# Patient Record
Sex: MALE | Race: WHITE | NOT HISPANIC OR LATINO | Employment: OTHER | URBAN - NONMETROPOLITAN AREA
[De-identification: names, ages, dates, MRNs, and addresses within clinical notes are randomized per-mention and may not be internally consistent; named-entity substitution may affect disease eponyms.]

---

## 2017-01-18 RX ORDER — ATORVASTATIN CALCIUM 20 MG/1
TABLET, FILM COATED ORAL
COMMUNITY
Start: 2016-11-03 | End: 2017-03-24 | Stop reason: SDUPTHER

## 2017-01-18 RX ORDER — HYDROCODONE BITARTRATE AND ACETAMINOPHEN 7.5; 325 MG/1; MG/1
1 TABLET ORAL EVERY 8 HOURS PRN
Refills: 0 | COMMUNITY
Start: 2016-11-23

## 2017-01-18 RX ORDER — LISINOPRIL 5 MG/1
TABLET ORAL
Refills: 0 | COMMUNITY
Start: 2016-12-01 | End: 2017-03-24

## 2017-02-09 RX ORDER — FUROSEMIDE 40 MG/1
TABLET ORAL
Qty: 90 TABLET | Refills: 0 | Status: SHIPPED | OUTPATIENT
Start: 2017-02-09 | End: 2017-03-24 | Stop reason: SDUPTHER

## 2017-02-09 RX ORDER — LISINOPRIL 10 MG/1
TABLET ORAL
Qty: 90 TABLET | Refills: 0 | Status: SHIPPED | OUTPATIENT
Start: 2017-02-09 | End: 2017-03-24 | Stop reason: SDUPTHER

## 2017-03-24 RX ORDER — CARVEDILOL 25 MG/1
25 TABLET ORAL 2 TIMES DAILY WITH MEALS
Qty: 180 TABLET | Refills: 0 | Status: SHIPPED | OUTPATIENT
Start: 2017-03-24 | End: 2017-06-16 | Stop reason: SDUPTHER

## 2017-03-24 RX ORDER — LISINOPRIL 10 MG/1
10 TABLET ORAL DAILY
Qty: 90 TABLET | Refills: 0 | Status: SHIPPED | OUTPATIENT
Start: 2017-03-24 | End: 2017-06-16 | Stop reason: SDUPTHER

## 2017-03-24 RX ORDER — ATORVASTATIN CALCIUM 20 MG/1
20 TABLET, FILM COATED ORAL NIGHTLY
Qty: 90 TABLET | Refills: 0 | Status: SHIPPED | OUTPATIENT
Start: 2017-03-24 | End: 2017-06-16 | Stop reason: SDUPTHER

## 2017-03-24 RX ORDER — FUROSEMIDE 40 MG/1
40 TABLET ORAL DAILY
Qty: 90 TABLET | Refills: 0 | Status: SHIPPED | OUTPATIENT
Start: 2017-03-24 | End: 2017-06-16 | Stop reason: SDUPTHER

## 2017-03-28 ENCOUNTER — OFFICE VISIT (OUTPATIENT)
Dept: VASCULAR SURGERY | Facility: CLINIC | Age: 67
End: 2017-03-28

## 2017-03-28 ENCOUNTER — HOSPITAL ENCOUNTER (OUTPATIENT)
Dept: ULTRASOUND IMAGING | Facility: HOSPITAL | Age: 67
Discharge: HOME | End: 2017-03-28
Attending: SURGERY | Admitting: SURGERY

## 2017-03-28 VITALS
HEART RATE: 96 BPM | BODY MASS INDEX: 31.97 KG/M2 | HEIGHT: 72 IN | SYSTOLIC BLOOD PRESSURE: 152 MMHG | DIASTOLIC BLOOD PRESSURE: 80 MMHG | WEIGHT: 236 LBS

## 2017-03-28 DIAGNOSIS — I65.23 CAROTID OCCLUSION, BILATERAL: ICD-10-CM

## 2017-03-28 DIAGNOSIS — I73.9 PVD (PERIPHERAL VASCULAR DISEASE) (HCC): ICD-10-CM

## 2017-03-28 DIAGNOSIS — E78.2 MIXED HYPERLIPIDEMIA: ICD-10-CM

## 2017-03-28 DIAGNOSIS — I10 ESSENTIAL HYPERTENSION: ICD-10-CM

## 2017-03-28 DIAGNOSIS — I65.23 BILATERAL CAROTID ARTERY STENOSIS: Primary | ICD-10-CM

## 2017-03-28 PROCEDURE — 99214 OFFICE O/P EST MOD 30 MIN: CPT | Performed by: SURGERY

## 2017-03-28 PROCEDURE — 93880 EXTRACRANIAL BILAT STUDY: CPT | Performed by: SURGERY

## 2017-03-28 PROCEDURE — 93880 EXTRACRANIAL BILAT STUDY: CPT

## 2017-03-28 NOTE — PROGRESS NOTES
"03/28/2017      Alli Perry MD  [address] [facility] [address]        Jadon Flynn  1950    Chief Complaint   Patient presents with   • Follow-up       Dear Alli Perry MD:    HPI     I had the pleasure of seeing you patient in the office today for follow up.  As you recall, the patient is a 66 y.o. male who we are currently following for carotid occlusive disease.  He is status post left carotid endarterectomy and has a known right carotid occlusion.  Currently, he appears to be doing quite well and has no specific complaints.  He remains asymptomatic from a strokelike standpoint.  He had noninvasive testing performed today in which I personally reviewed.      /80  Pulse 96  Ht 72\" (182.9 cm)  Wt 236 lb (107 kg)  BMI 32.01 kg/m2  Physical Exam   Constitutional: He is oriented to person, place, and time. He appears well-developed and well-nourished.   HENT:   Head: Normocephalic and atraumatic.   Neck: Neck supple. No JVD present. Carotid bruit is not present. No thyromegaly present.   Cardiovascular: Normal rate, regular rhythm and normal heart sounds.    Pulses:       Carotid pulses are 2+ on the right side, and 2+ on the left side.       Femoral pulses are 2+ on the right side, and 2+ on the left side.       Popliteal pulses are 2+ on the right side, and 2+ on the left side.        Dorsalis pedis pulses are 2+ on the right side, and 2+ on the left side.        Posterior tibial pulses are 2+ on the right side, and 2+ on the left side.   Pulmonary/Chest: Effort normal and breath sounds normal.   Abdominal: Soft. Bowel sounds are normal. He exhibits no distension, no abdominal bruit and no mass. There is no hepatosplenomegaly. There is no tenderness.   Musculoskeletal: Normal range of motion. He exhibits no edema.   Neurological: He is alert and oriented to person, place, and time. He has normal strength. No cranial nerve deficit or sensory deficit.   Skin: Skin is warm and intact. "   Nursing note and vitals reviewed.      DIAGNOSTIC DATA: There is a known right carotid occlusion.  There is 50-69% carotid occlusive disease on the left.  There is bilateral antegrade vertebral artery flow.    Patient Active Problem List   Diagnosis   • PVD (peripheral vascular disease)   • CAD (coronary artery disease)   • S/P implantation of automatic cardioverter/defibrillator (AICD)   • HLD (hyperlipidemia)   • Ischemic heart disease   • HTN (hypertension)   • CHF (congestive heart failure)   • CKD (chronic kidney disease)         ICD-10-CM ICD-9-CM   1. Bilateral carotid artery stenosis I65.23 433.10     433.30   2. Mixed hyperlipidemia E78.2 272.2   3. Essential hypertension I10 401.9   4. PVD (peripheral vascular disease) I73.9 443.9           PLAN: After thoroughly evaluating Jadon Flynn, I believe the best course of action is to continue to remain conservative from a vascular surgery standpoint.  Currently, he appears to be doing quite well and remains a symptomatically from a strokelike standpoint.  His noninvasive testing is within normal limits and unchanged.  I will see him back in 1 year's time with a repeat carotid duplex for continued surveillance.  The patient is to continue taking their medications as previously discussed.   This was all discussed in full with complete understanding.  Thank you for allowing me to participate in the care of your patient.  Please do not hesitate to call with any questions or concerns.  We will keep you aware of any further encounters with Jadon Flynn.      Sincerely Yours,        Dave Chavarria, DO

## 2017-06-16 ENCOUNTER — OFFICE VISIT (OUTPATIENT)
Dept: CARDIOLOGY | Facility: CLINIC | Age: 67
End: 2017-06-16

## 2017-06-16 VITALS
DIASTOLIC BLOOD PRESSURE: 68 MMHG | SYSTOLIC BLOOD PRESSURE: 120 MMHG | BODY MASS INDEX: 28.37 KG/M2 | HEIGHT: 76 IN | WEIGHT: 233 LBS | OXYGEN SATURATION: 98 % | HEART RATE: 64 BPM

## 2017-06-16 DIAGNOSIS — E78.2 MIXED HYPERLIPIDEMIA: ICD-10-CM

## 2017-06-16 DIAGNOSIS — I50.22 CHRONIC SYSTOLIC CONGESTIVE HEART FAILURE (HCC): ICD-10-CM

## 2017-06-16 DIAGNOSIS — I25.10 CORONARY ARTERY DISEASE INVOLVING NATIVE CORONARY ARTERY OF NATIVE HEART WITHOUT ANGINA PECTORIS: Primary | ICD-10-CM

## 2017-06-16 DIAGNOSIS — Z95.810 S/P IMPLANTATION OF AUTOMATIC CARDIOVERTER/DEFIBRILLATOR (AICD): ICD-10-CM

## 2017-06-16 DIAGNOSIS — I10 ESSENTIAL HYPERTENSION: ICD-10-CM

## 2017-06-16 PROCEDURE — 93000 ELECTROCARDIOGRAM COMPLETE: CPT | Performed by: PHYSICIAN ASSISTANT

## 2017-06-16 PROCEDURE — 99214 OFFICE O/P EST MOD 30 MIN: CPT | Performed by: PHYSICIAN ASSISTANT

## 2017-06-16 RX ORDER — LISINOPRIL 10 MG/1
10 TABLET ORAL DAILY
Qty: 90 TABLET | Refills: 3 | Status: SHIPPED | OUTPATIENT
Start: 2017-06-16 | End: 2018-07-29 | Stop reason: SDUPTHER

## 2017-06-16 RX ORDER — ATORVASTATIN CALCIUM 20 MG/1
20 TABLET, FILM COATED ORAL NIGHTLY
Qty: 90 TABLET | Refills: 3 | Status: SHIPPED | OUTPATIENT
Start: 2017-06-16 | End: 2018-07-29 | Stop reason: SDUPTHER

## 2017-06-16 RX ORDER — CLOPIDOGREL BISULFATE 75 MG/1
75 TABLET ORAL DAILY
Qty: 90 TABLET | Refills: 3 | Status: SHIPPED | OUTPATIENT
Start: 2017-06-16 | End: 2018-09-26 | Stop reason: SDUPTHER

## 2017-06-16 RX ORDER — FUROSEMIDE 40 MG/1
40 TABLET ORAL DAILY
Qty: 90 TABLET | Refills: 3 | Status: SHIPPED | OUTPATIENT
Start: 2017-06-16 | End: 2018-07-29 | Stop reason: SDUPTHER

## 2017-06-16 RX ORDER — CARVEDILOL 25 MG/1
25 TABLET ORAL 2 TIMES DAILY WITH MEALS
Qty: 180 TABLET | Refills: 3 | Status: SHIPPED | OUTPATIENT
Start: 2017-06-16 | End: 2018-06-30 | Stop reason: SDUPTHER

## 2017-06-16 NOTE — PROGRESS NOTES
Subjective:     Encounter Date:06/16/2017      Patient ID: Jadon Flynn is a 66 y.o. male w hx of CAD, CHF, s/p AICD, HLD, HTN who [facility]. The patient relates he's feeling well. He denies any edema, dyspnea, chest pain, palpitations, orthopnea, weight gain or related. He states he's able to exert himself daily, working in the yard, walking, etc.without any issue. He relates his blood pressure has been well controlled now for some time.     Chief Complaint:  Coronary Artery Disease   Presents for follow-up visit. Pertinent negatives include no chest pain, dizziness, leg swelling, palpitations, shortness of breath or weight gain. Risk factors include hyperlipidemia. His past medical history is significant for CHF. The symptoms have been stable. Compliance with diet is good. Compliance with exercise is good. Compliance with medications is good.   Congestive Heart Failure   Presents for follow-up visit. Pertinent negatives include no chest pain, claudication, near-syncope, orthopnea, palpitations or shortness of breath. The symptoms have been stable. His past medical history is significant for CAD.   Hypertension   This is a chronic problem. The current episode started more than 1 year ago. The problem has been gradually improving since onset. The problem is controlled. Pertinent negatives include no chest pain, malaise/fatigue, orthopnea, palpitations, PND or shortness of breath. There are no associated agents to hypertension. Risk factors for coronary artery disease include dyslipidemia and male gender. Past treatments include ACE inhibitors, beta blockers and diuretics. The current treatment provides moderate improvement. There are no compliance problems.  Hypertensive end-organ damage includes CAD/MI. Identifiable causes of hypertension include chronic renal disease.   Hyperlipidemia   This is a chronic problem. The current episode started more than 1 year ago.  Exacerbating diseases include chronic renal disease. There are no known factors aggravating his hyperlipidemia. Pertinent negatives include no chest pain, focal weakness, myalgias or shortness of breath. Current antihyperlipidemic treatment includes statins. The current treatment provides moderate improvement of lipids. There are no compliance problems.  Risk factors for coronary artery disease include dyslipidemia, male sex and hypertension.       The following portions of the patient's history were reviewed and updated as appropriate: allergies, current medications, past family history, past medical history, past social history, past surgical history and problem list.    Review of Systems   Constitution: Negative for malaise/fatigue and weight gain.   Cardiovascular: Negative for chest pain, claudication, dyspnea on exertion, irregular heartbeat, leg swelling, near-syncope, orthopnea, palpitations, paroxysmal nocturnal dyspnea and syncope.   Respiratory: Negative for hemoptysis and shortness of breath.    Hematologic/Lymphatic: Negative for bleeding problem.   Skin: Negative for poor wound healing.   Musculoskeletal: Negative for myalgias.   Gastrointestinal: Negative for melena, nausea and vomiting.   Genitourinary: Negative for hematuria.   Neurological: Negative for dizziness, focal weakness and light-headedness.   Psychiatric/Behavioral: Negative for memory loss.   All other systems reviewed and are negative.        ECG 12 Lead  Date/Time: 6/16/2017 1:46 PM  Performed by: MICHAEL DOMINGUEZ  Authorized by: MICHAEL DOMINGUEZ   Comparison: compared with previous ECG from 6/14/2016  Comparison to previous ECG: LBBB is now present, iLBBB last EKG  Rhythm: sinus rhythm  Rate: normal  Conduction: left bundle branch block  ST Segments: ST segments normal  T depression: aVL  QRS axis: left  Clinical impression: abnormal ECG               Objective:     Physical Exam   Constitutional: He is oriented to person, place, and  "time. He appears well-developed and well-nourished.   HENT:   Head: Normocephalic and atraumatic.   Eyes: Conjunctivae and EOM are normal. Pupils are equal, round, and reactive to light.   Neck: Normal range of motion. Neck supple. No JVD present.   Cardiovascular: Normal rate, regular rhythm, S1 normal, S2 normal, normal heart sounds, intact distal pulses and normal pulses.    No murmur heard.  Pulmonary/Chest: Effort normal and breath sounds normal. No respiratory distress.   Abdominal: Soft. Bowel sounds are normal. He exhibits no distension.   Musculoskeletal: He exhibits no edema.   Neurological: He is alert and oriented to person, place, and time.   Skin: Skin is warm and dry.   Psychiatric: He has a normal mood and affect. Judgment normal.   Vitals reviewed.      /68 (BP Location: Right arm, Patient Position: Sitting, Cuff Size: Adult)  Pulse 64  Ht 76\" (193 cm)  Wt 233 lb (106 kg)  SpO2 98%  BMI 28.36 kg/m2    Current Outpatient Prescriptions:   •  allopurinol (ZYLOPRIM) 100 MG tablet, Take 100 mg by mouth Daily., Disp: , Rfl:   •  aspirin 81 MG EC tablet, Take 81 mg by mouth Daily., Disp: , Rfl:   •  atorvastatin (LIPITOR) 20 MG tablet, Take 1 tablet by mouth Every Night., Disp: 90 tablet, Rfl: 3  •  carvedilol (COREG) 25 MG tablet, Take 1 tablet by mouth 2 (Two) Times a Day With Meals., Disp: 180 tablet, Rfl: 3  •  clopidogrel (PLAVIX) 75 MG tablet, Take 1 tablet by mouth Daily., Disp: 90 tablet, Rfl: 3  •  furosemide (LASIX) 40 MG tablet, Take 1 tablet by mouth Daily., Disp: 90 tablet, Rfl: 3  •  HYDROcodone-acetaminophen (NORCO) 7.5-325 MG per tablet, take 1 tablet by mouth three times a day if needed for pain -FILL DATE 11-, Disp: , Rfl: 0  •  lisinopril (PRINIVIL,ZESTRIL) 10 MG tablet, Take 1 tablet by mouth Daily., Disp: 90 tablet, Rfl: 3  Past Medical History:   Diagnosis Date   • CAD (coronary artery disease) 12/07/2016   • Carotid artery disease    • CHF (congestive heart failure) " 12/07/2016   • CKD (chronic kidney disease) 12/07/2016   • Gout    • HLD (hyperlipidemia) 12/07/2016   • HTN (hypertension) 12/07/2016   • Ischemic heart disease 12/07/2016   • PVD (peripheral vascular disease) 12/07/2016   • S/P implantation of automatic cardioverter/defibrillator (AICD) 12/07/2016   • Stroke      Past Surgical History:   Procedure Laterality Date   • CARDIAC DEFIBRILLATOR PLACEMENT     • CAROTID ENDARTERECTOMY     • CARPAL TUNNEL RELEASE Right    • CORONARY STENT PLACEMENT     • HYDROCELE EXCISION / REPAIR     • INSERT / REPLACE / REMOVE PACEMAKER     • TOTAL KNEE ARTHROPLASTY Left      No Known Allergies  Social History     Social History   • Marital status:      Spouse name: N/A   • Number of children: N/A   • Years of education: N/A     Occupational History   • Not on file.     Social History Main Topics   • Smoking status: Former Smoker     Quit date: 2010   • Smokeless tobacco: Never Used   • Alcohol use No   • Drug use: No   • Sexual activity: Defer     Other Topics Concern   • Not on file     Social History Narrative     Family History   Problem Relation Age of Onset   • Cancer Father    • Heart disease Mother    • Diabetes Mother    • Sleep apnea Mother    • Hypertension Mother            Assessment:          Diagnosis Plan   1. Coronary artery disease involving native coronary artery of native heart without angina pectoris      on asa and plavix   2. S/P implantation of automatic cardioverter/defibrillator (AICD)     3. Mixed hyperlipidemia  Lipid Panel    on statin    4. Essential hypertension      Well controlled   5. Chronic systolic congestive heart failure            Plan:       1. Continue present therapy  2. Patient educated at length regarding low sodium diet, daily weights, and daily blood pressure monitoring. Patient instructed to bring daily weights and blood pressures to follow up office visit. Patient instructed to call with a 2lb weight gain overnight, 5lb weight gain  in 1 week, increasing dyspnea or edema.  3. Counseled on diet, exercise, medication compliance  4. Follow-up in 1 year unless needed sooner  5. Verbalized understanding of instructions.

## 2017-12-05 ENCOUNTER — CLINICAL SUPPORT (OUTPATIENT)
Dept: CARDIOLOGY | Facility: CLINIC | Age: 67
End: 2017-12-05

## 2017-12-05 DIAGNOSIS — Z95.810 S/P IMPLANTATION OF AUTOMATIC CARDIOVERTER/DEFIBRILLATOR (AICD): ICD-10-CM

## 2017-12-05 PROCEDURE — 93295 DEV INTERROG REMOTE 1/2/MLT: CPT | Performed by: PHYSICIAN ASSISTANT

## 2017-12-05 PROCEDURE — 93296 REM INTERROG EVL PM/IDS: CPT | Performed by: PHYSICIAN ASSISTANT

## 2017-12-06 NOTE — PROGRESS NOTES
Single Chamber AICD Evaluation Report  Clinic Interrogation    December 5, 2017    Primary Cardiologist: Mike  : Guidant Model: Teligen  Implant date: 11/9/11    Reason for evaluation: routine  Indication for AICD: chronic systolic heart failure    Measurements  Ventricular sensing - R wave: 5.2 mV  Ventricular threshold: 1.8 V @ 0.4 ms  Ventricular lead impedance: 384 ohms  Shock Impedance: 54 ohms        Diagnostic Data  Paced: 2 %  Other: 3 episodes of NSVT  Battery status: satisfactory     Final Parameters  Mode: VVI  Lower rate: 50 bpm   Ventricular - Amplitude: 4.0 V   Pulse width: 0.4 ms   Sensitivity: 0.6 mV   Changes made: n/a  Conclusions: normal AICD function    Follow up: 6 months

## 2018-03-06 ENCOUNTER — CLINICAL SUPPORT NO REQUIREMENTS (OUTPATIENT)
Dept: CARDIOLOGY | Facility: CLINIC | Age: 68
End: 2018-03-06

## 2018-03-06 DIAGNOSIS — I50.22 CHRONIC SYSTOLIC CONGESTIVE HEART FAILURE (HCC): ICD-10-CM

## 2018-03-06 DIAGNOSIS — Z95.810 S/P IMPLANTATION OF AUTOMATIC CARDIOVERTER/DEFIBRILLATOR (AICD): Primary | ICD-10-CM

## 2018-03-06 PROCEDURE — 93282 PRGRMG EVAL IMPLANTABLE DFB: CPT | Performed by: INTERNAL MEDICINE

## 2018-03-07 NOTE — PROGRESS NOTES
Single Chamber AICD Evaluation Report  IN OFFICE    March 6, 2018    Primary Cardiologist: Mike  : Guidant Model: Teligen  Implant date: 11/09/2011    Reason for evaluation: routine  Indication for AICD: congestive heart failure    Measurements  Ventricular sensing - R wave: 4.7 mV  Ventricular threshold: 1.7 V @ 0.4 ms  Ventricular lead impedance: 392 ohms  Shock Impedance: 59 ohms      Diagnostic Data  Paced: 1 %  Other: No episodes recorded  Battery status: satisfactory, est 7 years remaining     Final Parameters  Mode: VVI  Lower rate: 50 bpm   Ventricular - Amplitude: 3.4 V   Pulse width: 0.4 ms   Sensitivity: 0.6 mV   Changes made: Normal sebas ventricular output decreased from 4V to 3.4V.  Conclusions: normal AICD function, no therapy delivered, stable pacing and sensing thresholds and adequate battery reserve    Follow up: 3 months via [facility]--Louann Jeffery MA, ordered new monitor for patient.  Will follow remotely every three months and will see in office in one year.

## 2018-03-27 ENCOUNTER — OFFICE VISIT (OUTPATIENT)
Dept: VASCULAR SURGERY | Facility: CLINIC | Age: 68
End: 2018-03-27

## 2018-03-27 ENCOUNTER — HOSPITAL ENCOUNTER (OUTPATIENT)
Dept: ULTRASOUND IMAGING | Facility: HOSPITAL | Age: 68
Discharge: HOME | End: 2018-03-27
Admitting: NURSE PRACTITIONER

## 2018-03-27 VITALS
HEIGHT: 76 IN | HEART RATE: 87 BPM | WEIGHT: 230 LBS | SYSTOLIC BLOOD PRESSURE: 126 MMHG | DIASTOLIC BLOOD PRESSURE: 76 MMHG | BODY MASS INDEX: 28.01 KG/M2 | OXYGEN SATURATION: 99 %

## 2018-03-27 DIAGNOSIS — I65.23 BILATERAL CAROTID ARTERY STENOSIS: Primary | ICD-10-CM

## 2018-03-27 DIAGNOSIS — I10 ESSENTIAL HYPERTENSION: ICD-10-CM

## 2018-03-27 DIAGNOSIS — I65.23 BILATERAL CAROTID ARTERY STENOSIS: ICD-10-CM

## 2018-03-27 DIAGNOSIS — R25.2 LEG CRAMPING: ICD-10-CM

## 2018-03-27 DIAGNOSIS — I65.21 ICAO (INTERNAL CAROTID ARTERY OCCLUSION), RIGHT: ICD-10-CM

## 2018-03-27 PROCEDURE — 93880 EXTRACRANIAL BILAT STUDY: CPT | Performed by: SURGERY

## 2018-03-27 PROCEDURE — 99213 OFFICE O/P EST LOW 20 MIN: CPT | Performed by: NURSE PRACTITIONER

## 2018-03-27 PROCEDURE — 93880 EXTRACRANIAL BILAT STUDY: CPT

## 2018-03-27 NOTE — PATIENT INSTRUCTIONS
How to Use Compression Stockings  Compression stockings are elastic socks that squeeze the legs. They help to increase blood flow to the legs, decrease swelling in the legs, and reduce the chance of developing blood clots in the lower legs. Compression stockings are often used by people who:  · Are recovering from surgery.  · Have poor circulation in their legs.  · Are prone to getting blood clots in their legs.  · Have varicose veins.  · Sit or stay in bed for long periods of time.  How to use compression stockings  Before you put on your compression stockings:  · Make sure that they are the correct size. If you do not know your size, ask your health care provider.  · Make sure that they are clean, dry, and in good condition.  · Check them for rips and tears. Do not put them on if they are ripped or torn.  Put your stockings on first thing in the morning, before you get out of bed. Keep them on for as long as your health care provider advises. When you are wearing your stockings:  · Keep them as smooth as possible. Do not allow them to bunch up. It is especially important to prevent the stockings from bunching up around your toes or behind your knees.  · Do not roll the stockings downward and leave them rolled down. This can decrease blood flow to your leg.  · Change them right away if they become wet or dirty.  1.   When you take off your stockings, inspect your legs and feet. Anything that does not seem normal may require medical attention. Look for:  · Open sores.  · Red spots.  · Swelling.  Information and tips  · Do not stop wearing your compression stockings without talking to your health care provider first.  · Wash your stockings every day with mild detergent in cold or warm water. Do not use bleach. Air-dry your stockings or dry them in a clothes dryer on low heat.  · Replace your stockings every 3-6 months.  · If skin moisturizing is part of your treatment plan, apply lotion or cream at night so that your  skin will be dry when you put on the stockings in the morning. It is harder to put the stockings on when you have lotion on your legs or feet.  Contact a health care provider if:  Remove your stockings and seek medical care if:  · You have a feeling of pins and needles in your feet or legs.  · You have any new changes in your skin.  · You have skin lesions that are getting worse.  · You have swelling or pain that is getting worse.  Get help right away if:  · You have numbness or tingling in your lower legs that does not get better right after you take the stockings off.  · Your toes or feet become cold and blue.  · You develop open sores or red spots on your legs that do not go away.  · You see or feel a warm spot on your leg.  · You have new swelling or soreness in your leg.  · You are short of breath or you have chest pain for no reason.  · You have a rapid or irregular heartbeat.  · You feel light-headed or dizzy.  This information is not intended to replace advice given to you by your health care provider. Make sure you discuss any questions you have with your health care provider.  Document Released: 10/15/2010 Document Revised: 05/17/2017 Document Reviewed: 11/25/2015  [employer] Interactive Patient Education © 2017 [employer]

## 2018-06-13 ENCOUNTER — TELEPHONE (OUTPATIENT)
Dept: VASCULAR SURGERY | Facility: CLINIC | Age: 68
End: 2018-06-13

## 2018-06-13 ENCOUNTER — CLINICAL SUPPORT (OUTPATIENT)
Dept: CARDIOLOGY | Facility: CLINIC | Age: 68
End: 2018-06-13

## 2018-06-13 DIAGNOSIS — I50.22 CHRONIC SYSTOLIC CONGESTIVE HEART FAILURE (HCC): ICD-10-CM

## 2018-06-13 PROCEDURE — 93295 DEV INTERROG REMOTE 1/2/MLT: CPT | Performed by: PHYSICIAN ASSISTANT

## 2018-06-13 PROCEDURE — 93296 REM INTERROG EVL PM/IDS: CPT | Performed by: PHYSICIAN ASSISTANT

## 2018-06-13 NOTE — TELEPHONE ENCOUNTER
Left message for patient regarding appointment.  Dr. Chavarria will be in surgery so Shwetha will be seeing his patients on 6/26/2018.

## 2018-06-26 ENCOUNTER — OFFICE VISIT (OUTPATIENT)
Dept: VASCULAR SURGERY | Facility: CLINIC | Age: 68
End: 2018-06-26

## 2018-06-26 ENCOUNTER — HOSPITAL ENCOUNTER (OUTPATIENT)
Dept: ULTRASOUND IMAGING | Facility: HOSPITAL | Age: 68
Discharge: HOME | End: 2018-06-26
Admitting: NURSE PRACTITIONER

## 2018-06-26 VITALS
HEIGHT: 76 IN | HEART RATE: 82 BPM | DIASTOLIC BLOOD PRESSURE: 77 MMHG | WEIGHT: 230 LBS | BODY MASS INDEX: 28.01 KG/M2 | OXYGEN SATURATION: 99 % | SYSTOLIC BLOOD PRESSURE: 150 MMHG

## 2018-06-26 DIAGNOSIS — Z79.02 ENCOUNTER FOR MONITORING ANTIPLATELET THERAPY: ICD-10-CM

## 2018-06-26 DIAGNOSIS — I65.23 BILATERAL CAROTID ARTERY STENOSIS: ICD-10-CM

## 2018-06-26 DIAGNOSIS — R25.2 LEG CRAMPING: ICD-10-CM

## 2018-06-26 DIAGNOSIS — I87.2 VENOUS INSUFFICIENCY: Primary | ICD-10-CM

## 2018-06-26 DIAGNOSIS — Z51.81 ENCOUNTER FOR MONITORING ANTIPLATELET THERAPY: ICD-10-CM

## 2018-06-26 DIAGNOSIS — Z01.818 PREOP TESTING: ICD-10-CM

## 2018-06-26 DIAGNOSIS — E78.2 MIXED HYPERLIPIDEMIA: ICD-10-CM

## 2018-06-26 DIAGNOSIS — I10 ESSENTIAL HYPERTENSION: ICD-10-CM

## 2018-06-26 PROCEDURE — 99214 OFFICE O/P EST MOD 30 MIN: CPT | Performed by: NURSE PRACTITIONER

## 2018-06-26 PROCEDURE — 93970 EXTREMITY STUDY: CPT | Performed by: SURGERY

## 2018-06-26 PROCEDURE — 93970 EXTREMITY STUDY: CPT

## 2018-06-26 NOTE — PROGRESS NOTES
"06/26/2018       Alli Perry MD  [address] [facility] [address]        Jadon Flynn  1950    Chief Complaint   Patient presents with   • Follow-up     US Venous Doppler Lower Extremity Bilateral this AM. Pt denies any stoke like symptoms. Pt c/o a twitching and tightening feeling in bilat lower legs.       Dear Alli Perry MD:    HPI     I had the pleasure of seeing you patient in the office today for follow up.  As you recall, the patient is a 67 y.o. male who we are currently following for carotid occlusive disease.  He is status post left carotid endarterectomy and has a known right carotid occlusion.  Currently, he appears to be doing quite well and has no specific complaints.  He remains asymptomatic from a strokelike standpoint. He has complaints of muscle twitching and cramping to his legs at night.  He has been wearing compression stockings, but did not feel any benefit.  He had noninvasive testing performed today in which I personally reviewed.      /77 (BP Location: Right arm, Patient Position: Sitting, Cuff Size: Adult)   Pulse 82   Ht 191.8 cm (75.5\")   Wt 104 kg (230 lb)   SpO2 99%   BMI 28.37 kg/m²   Physical Exam   Constitutional: He is oriented to person, place, and time. He appears well-developed and well-nourished.   HENT:   Head: Normocephalic and atraumatic.   Neck: Neck supple. No JVD present. Carotid bruit is not present. No thyromegaly present.   Cardiovascular: Normal rate, regular rhythm and normal heart sounds.    Pulses:       Carotid pulses are 2+ on the right side, and 2+ on the left side.       Femoral pulses are 2+ on the right side, and 2+ on the left side.       Popliteal pulses are 2+ on the right side, and 2+ on the left side.        Dorsalis pedis pulses are 2+ on the right side, and 2+ on the left side.        Posterior tibial pulses are 2+ on the right side, and 2+ on the left side.   Pulmonary/Chest: Effort normal and breath sounds normal. "   Abdominal: Soft. Bowel sounds are normal. He exhibits no distension, no abdominal bruit and no mass. There is no hepatosplenomegaly. There is no tenderness.   Musculoskeletal: Normal range of motion. He exhibits no edema.   Neurological: He is alert and oriented to person, place, and time. He has normal strength. No cranial nerve deficit or sensory deficit.   Skin: Skin is warm and intact.   Nursing note and vitals reviewed.      DIAGNOSTIC DATA: Venous valvular insufficiency study shows venous insufficiency to right lower extremity.    Patient Active Problem List   Diagnosis   • PVD (peripheral vascular disease)   • CAD (coronary artery disease)   • S/P implantation of automatic cardioverter/defibrillator (AICD)   • HLD (hyperlipidemia)   • Ischemic heart disease   • HTN (hypertension)   • CHF (congestive heart failure)   • CKD (chronic kidney disease)   • ICAO (internal carotid artery occlusion), right         ICD-10-CM ICD-9-CM   1. Venous insufficiency I87.2 459.81   2. Bilateral carotid artery stenosis I65.23 433.10     433.30   3. Mixed hyperlipidemia E78.2 272.2   4. Essential hypertension I10 401.9   5. Preop testing Z01.818 V72.84   6. Encounter for monitoring antiplatelet therapy Z51.81 V58.83    Z79.02 V58.63       PLAN: After thoroughly evaluating Jadon Flynn, I believe the best course of action is to proceed with a right lower extremity radiofrequency ablation of the greater saphenous vein.  He does have significant venous reflux noted.  Risks of radiofrequency ablation include, but are not limited to, bleeding, infection, vessel damage, nerve damage, DVT, phlebitis, and pulmonary embolus.  The patient understands these risks and wishes to proceed with procedure.  He can continue to wear compression stockings in the 20-30 mm pressure gradient range.  I did instruct the patient on how to wear these on a daily basis.  Currently, he appears to be doing quite well and remains a symptomatically  from a strokelike standpoint.  His noninvasive testing is unchanged.  We will follow with carotid disease in 1 year.   I did discuss vascular risk factors as they pertain to the progression of vascular disease including controlling his hypertension and hyperlipidemia.  Body mass index is 28.37 kg/m². Follow up with PCP regarding plan including diet and exercise.  The patient is to continue taking their medications as previously discussed.   This was all discussed in full with complete understanding.  Thank you for allowing me to participate in the care of your patient.  Please do not hesitate to call with any questions or concerns.  We will keep you aware of any further encounters with Jadon Flynn.      Sincerely Yours,        OLI Barnett

## 2018-06-26 NOTE — H&P
06/26/2018       Alli Perry MD  [address] [facility] [address]        Jadon Flynn  1950    Chief Complaint   Patient presents with   • Follow-up     US Venous Doppler Lower Extremity Bilateral this AM. Pt denies any stoke like symptoms. Pt c/o a twitching and tightening feeling in bilat lower legs.       Dear Alli Perry MD:    HPI     I had the pleasure of seeing you patient in the office today for follow up.  As you recall, the patient is a 67 y.o. male who we are currently following for carotid occlusive disease.  He is status post left carotid endarterectomy and has a known right carotid occlusion.  Currently, he appears to be doing quite well and has no specific complaints.  He remains asymptomatic from a strokelike standpoint. He has complaints of muscle twitching and cramping to his legs at night.  He has been wearing compression stockings, but did not feel any benefit.  He had noninvasive testing performed today in which I personally reviewed.      Past Medical History:   Diagnosis Date   • Asymptomatic bilateral carotid artery stenosis    • CAD (coronary artery disease) 12/07/2016   • CAD in native artery      2009 stents   • Carotid artery disease    • CHF (congestive heart failure) 12/07/2016   • Chronic combined systolic and diastolic CHF (congestive heart failure)     echo 5/2013 EF 35-40%   • Chronic systolic CHF (congestive heart failure), NYHA class 2    • CKD (chronic kidney disease) 12/07/2016   • CKD (chronic kidney disease), stage III    • Essential hypertension    • Gout    • HLD (hyperlipidemia) 12/07/2016   • HTN (hypertension) 12/07/2016   • Hyperkalemia    • Hyperlipidemia, unspecified    • Ischemic heart disease 12/07/2016   • Ischemic heart disease    • Peripheral vascular disease    • PVD (peripheral vascular disease) 12/07/2016   • S/P implantation of automatic cardioverter/defibrillator (AICD) 12/07/2016   • S/P implantation of automatic  cardioverter/defibrillator (AICD)    • Stroke      Past Surgical History:   Procedure Laterality Date   • CARDIAC DEFIBRILLATOR PLACEMENT     • CAROTID ENDARTERECTOMY     • CARPAL TUNNEL RELEASE Right    • CORONARY STENT PLACEMENT     • HYDROCELE EXCISION / REPAIR     • INSERT / REPLACE / REMOVE PACEMAKER     • TOTAL KNEE ARTHROPLASTY Left      Family History   Problem Relation Age of Onset   • Cancer Father    • Heart disease Mother    • Diabetes Mother    • Sleep apnea Mother    • Hypertension Mother    • Coronary artery disease Other      Social History   Substance Use Topics   • Smoking status: Former Smoker     Quit date: 2010   • Smokeless tobacco: Never Used   • Alcohol use No     .allrgy    Current Outpatient Prescriptions:   •  allopurinol (ZYLOPRIM) 100 MG tablet, Take 100 mg by mouth Daily., Disp: , Rfl:   •  aspirin 81 MG EC tablet, Take 81 mg by mouth Daily., Disp: , Rfl:   •  atorvastatin (LIPITOR) 20 MG tablet, Take 1 tablet by mouth Every Night., Disp: 90 tablet, Rfl: 3  •  carvedilol (COREG) 25 MG tablet, Take 1 tablet by mouth 2 (Two) Times a Day With Meals., Disp: 180 tablet, Rfl: 3  •  clopidogrel (PLAVIX) 75 MG tablet, Take 1 tablet by mouth Daily., Disp: 90 tablet, Rfl: 3  •  furosemide (LASIX) 40 MG tablet, Take 1 tablet by mouth Daily., Disp: 90 tablet, Rfl: 3  •  HYDROcodone-acetaminophen (NORCO) 7.5-325 MG per tablet, take 1 tablet by mouth three times a day if needed for pain -FILL DATE 11-, Disp: , Rfl: 0  •  lisinopril (PRINIVIL,ZESTRIL) 10 MG tablet, Take 1 tablet by mouth Daily., Disp: 90 tablet, Rfl: 3    Review of Systems   Constitutional: Negative.    HENT: Negative.    Eyes: Negative.    Respiratory: Negative.    Cardiovascular: Positive for leg swelling.        Leg cramping   Gastrointestinal: Negative.    Endocrine: Negative.    Genitourinary: Negative.    Musculoskeletal: Negative.    Skin: Negative.    Allergic/Immunologic: Negative.    Neurological: Negative.   "  Hematological: Negative.    Psychiatric/Behavioral: Negative.    All other systems reviewed and are negative.      /77 (BP Location: Right arm, Patient Position: Sitting, Cuff Size: Adult)   Pulse 82   Ht 191.8 cm (75.5\")   Wt 104 kg (230 lb)   SpO2 99%   BMI 28.37 kg/m²   Physical Exam   Constitutional: He is oriented to person, place, and time. He appears well-developed and well-nourished.   HENT:   Head: Normocephalic and atraumatic.   Neck: Neck supple. No JVD present. Carotid bruit is not present. No thyromegaly present.   Cardiovascular: Normal rate, regular rhythm and normal heart sounds.    Pulses:       Carotid pulses are 2+ on the right side, and 2+ on the left side.       Femoral pulses are 2+ on the right side, and 2+ on the left side.       Popliteal pulses are 2+ on the right side, and 2+ on the left side.        Dorsalis pedis pulses are 2+ on the right side, and 2+ on the left side.        Posterior tibial pulses are 2+ on the right side, and 2+ on the left side.   Pulmonary/Chest: Effort normal and breath sounds normal.   Abdominal: Soft. Bowel sounds are normal. He exhibits no distension, no abdominal bruit and no mass. There is no hepatosplenomegaly. There is no tenderness.   Musculoskeletal: Normal range of motion. He exhibits no edema.   Neurological: He is alert and oriented to person, place, and time. He has normal strength. No cranial nerve deficit or sensory deficit.   Skin: Skin is warm and intact.   Nursing note and vitals reviewed.      DIAGNOSTIC DATA: Venous valvular insufficiency study shows venous insufficiency to right lower extremity.    Patient Active Problem List   Diagnosis   • PVD (peripheral vascular disease)   • CAD (coronary artery disease)   • S/P implantation of automatic cardioverter/defibrillator (AICD)   • HLD (hyperlipidemia)   • Ischemic heart disease   • HTN (hypertension)   • CHF (congestive heart failure)   • CKD (chronic kidney disease)   • ICAO " (internal carotid artery occlusion), right         ICD-10-CM ICD-9-CM   1. Venous insufficiency I87.2 459.81   2. Bilateral carotid artery stenosis I65.23 433.10     433.30   3. Mixed hyperlipidemia E78.2 272.2   4. Essential hypertension I10 401.9   5. Preop testing Z01.818 V72.84   6. Encounter for monitoring antiplatelet therapy Z51.81 V58.83    Z79.02 V58.63       PLAN: After thoroughly evaluating Jadon Flynn, I believe the best course of action is to proceed with a right lower extremity radiofrequency ablation of the greater saphenous vein.  He does have significant venous reflux noted.  Risks of radiofrequency ablation include, but are not limited to, bleeding, infection, vessel damage, nerve damage, DVT, phlebitis, and pulmonary embolus.  The patient understands these risks and wishes to proceed with procedure.  He can continue to wear compression stockings in the 20-30 mm pressure gradient range.  I did instruct the patient on how to wear these on a daily basis.  Currently, he appears to be doing quite well and remains a symptomatically from a strokelike standpoint.  His noninvasive testing is unchanged.  We will follow with carotid disease in 1 year.   I did discuss vascular risk factors as they pertain to the progression of vascular disease including controlling his hypertension and hyperlipidemia.  Body mass index is 28.37 kg/m². Follow up with PCP regarding plan including diet and exercise.  The patient is to continue taking their medications as previously discussed.   This was all discussed in full with complete understanding.  Thank you for allowing me to participate in the care of your patient.  Please do not hesitate to call with any questions or concerns.  We will keep you aware of any further encounters with Jadon Flynn.      Sincerely Yours,        OLI Barnett

## 2018-06-27 ENCOUNTER — TELEPHONE (OUTPATIENT)
Dept: VASCULAR SURGERY | Facility: CLINIC | Age: 68
End: 2018-06-27

## 2018-06-27 PROBLEM — I87.2 VENOUS INSUFFICIENCY: Status: ACTIVE | Noted: 2018-06-27

## 2018-06-27 PROBLEM — Z01.818 PREOP TESTING: Status: ACTIVE | Noted: 2018-06-27

## 2018-06-27 NOTE — TELEPHONE ENCOUNTER
Spoke with patient wife who advised that patient was not home and was out with friends.  She requested that I contact him tomorrow during the day to advise of upcoming surgical information

## 2018-06-28 ENCOUNTER — TELEPHONE (OUTPATIENT)
Dept: VASCULAR SURGERY | Facility: CLINIC | Age: 68
End: 2018-06-28

## 2018-06-28 NOTE — TELEPHONE ENCOUNTER
Called with questions regarding upcoming RFA.Informed  Carotid disease did not cause vascular insufficiency.Explained procedure and what venous insufficiency was.Compression socks still need to be worn.Patient voiced understanding

## 2018-06-29 ENCOUNTER — OFFICE VISIT (OUTPATIENT)
Dept: CARDIOLOGY | Facility: CLINIC | Age: 68
End: 2018-06-29

## 2018-06-29 VITALS
WEIGHT: 221 LBS | DIASTOLIC BLOOD PRESSURE: 75 MMHG | BODY MASS INDEX: 27.48 KG/M2 | HEART RATE: 75 BPM | OXYGEN SATURATION: 98 % | HEIGHT: 75 IN | SYSTOLIC BLOOD PRESSURE: 138 MMHG | RESPIRATION RATE: 18 BRPM

## 2018-06-29 DIAGNOSIS — I73.9 PVD (PERIPHERAL VASCULAR DISEASE) (HCC): ICD-10-CM

## 2018-06-29 DIAGNOSIS — I50.22 CHRONIC SYSTOLIC CONGESTIVE HEART FAILURE (HCC): ICD-10-CM

## 2018-06-29 DIAGNOSIS — I10 ESSENTIAL HYPERTENSION: ICD-10-CM

## 2018-06-29 DIAGNOSIS — I87.2 VENOUS INSUFFICIENCY: ICD-10-CM

## 2018-06-29 DIAGNOSIS — I25.10 CORONARY ARTERY DISEASE INVOLVING NATIVE CORONARY ARTERY OF NATIVE HEART WITHOUT ANGINA PECTORIS: Primary | ICD-10-CM

## 2018-06-29 DIAGNOSIS — N18.9 CHRONIC KIDNEY DISEASE, UNSPECIFIED CKD STAGE: ICD-10-CM

## 2018-06-29 DIAGNOSIS — Z95.810 S/P IMPLANTATION OF AUTOMATIC CARDIOVERTER/DEFIBRILLATOR (AICD): ICD-10-CM

## 2018-06-29 PROCEDURE — 93000 ELECTROCARDIOGRAM COMPLETE: CPT | Performed by: NURSE PRACTITIONER

## 2018-06-29 PROCEDURE — 99214 OFFICE O/P EST MOD 30 MIN: CPT | Performed by: NURSE PRACTITIONER

## 2018-06-29 NOTE — PROGRESS NOTES
"    Subjective:     Encounter Date:06/29/2018      Patient ID: Jadon Flynn is a 67 y.o. male.    Chief Complaint:  The patient reports he is feeling well overall. He admits some lower extremity pain. He reports he is scheduled for right lower extremity venous ablation next month per Dr. Chavarria. He denies chest pain, shortness of breath, edema, palpitations, orthopnea, PND, weight gain, syncope, or pre syncope. He reports weight loss and compliance with a low sodium diet. Stamina is good. He reports his sbp per home readings has been 110s.         The following portions of the patient's history were reviewed and updated as appropriate: allergies, current medications, past family history, past medical history, past social history, past surgical history and problem list.  /75 (BP Location: Right arm, Patient Position: Sitting, Cuff Size: Adult)   Pulse 75   Resp 18   Ht 190.5 cm (75\")   Wt 100 kg (221 lb)   SpO2 98%   BMI 27.62 kg/m²   No Known Allergies    Current Outpatient Prescriptions:   •  allopurinol (ZYLOPRIM) 100 MG tablet, Take 100 mg by mouth Daily., Disp: , Rfl:   •  aspirin 81 MG EC tablet, Take 81 mg by mouth Daily., Disp: , Rfl:   •  atorvastatin (LIPITOR) 20 MG tablet, Take 1 tablet by mouth Every Night., Disp: 90 tablet, Rfl: 3  •  carvedilol (COREG) 25 MG tablet, Take 1 tablet by mouth 2 (Two) Times a Day With Meals., Disp: 180 tablet, Rfl: 3  •  clopidogrel (PLAVIX) 75 MG tablet, Take 1 tablet by mouth Daily., Disp: 90 tablet, Rfl: 3  •  furosemide (LASIX) 40 MG tablet, Take 1 tablet by mouth Daily., Disp: 90 tablet, Rfl: 3  •  HYDROcodone-acetaminophen (NORCO) 7.5-325 MG per tablet, take 1 tablet by mouth three times a day if needed for pain -FILL DATE 11-, Disp: , Rfl: 0  •  lisinopril (PRINIVIL,ZESTRIL) 10 MG tablet, Take 1 tablet by mouth Daily., Disp: 90 tablet, Rfl: 3  Past Medical History:   Diagnosis Date   • Asymptomatic bilateral carotid artery stenosis    • CAD " (coronary artery disease) 12/07/2016   • CAD in native artery      2009 stents   • Carotid artery disease    • CHF (congestive heart failure) 12/07/2016   • Chronic combined systolic and diastolic CHF (congestive heart failure)     echo 5/2013 EF 35-40%   • Chronic systolic CHF (congestive heart failure), NYHA class 2    • CKD (chronic kidney disease) 12/07/2016   • CKD (chronic kidney disease), stage III    • Essential hypertension    • Gout    • HLD (hyperlipidemia) 12/07/2016   • HTN (hypertension) 12/07/2016   • Hyperkalemia    • Hyperlipidemia, unspecified    • Ischemic heart disease 12/07/2016   • Ischemic heart disease    • Peripheral vascular disease    • PVD (peripheral vascular disease) 12/07/2016   • S/P implantation of automatic cardioverter/defibrillator (AICD) 12/07/2016   • S/P implantation of automatic cardioverter/defibrillator (AICD)    • Stroke      Past Surgical History:   Procedure Laterality Date   • CARDIAC DEFIBRILLATOR PLACEMENT     • CAROTID ENDARTERECTOMY     • CARPAL TUNNEL RELEASE Right    • CORONARY STENT PLACEMENT     • HYDROCELE EXCISION / REPAIR     • INSERT / REPLACE / REMOVE PACEMAKER     • TOTAL KNEE ARTHROPLASTY Left      Social History     Social History   • Marital status:      Spouse name: N/A   • Number of children: N/A   • Years of education: N/A     Occupational History   • Not on file.     Social History Main Topics   • Smoking status: Former Smoker     Quit date: 2010   • Smokeless tobacco: Never Used   • Alcohol use No   • Drug use: No   • Sexual activity: Defer     Other Topics Concern   • Not on file     Social History Narrative   • No narrative on file     Family History   Problem Relation Age of Onset   • Cancer Father    • Heart disease Mother    • Diabetes Mother    • Sleep apnea Mother    • Hypertension Mother    • Coronary artery disease Other        Review of Systems   Constitution: Negative for chills, diaphoresis, fever, weakness and malaise/fatigue.    HENT: Negative for nosebleeds.    Eyes: Negative for visual disturbance.   Cardiovascular: Negative for chest pain, claudication, cyanosis, dyspnea on exertion, irregular heartbeat, leg swelling, near-syncope, orthopnea, palpitations, paroxysmal nocturnal dyspnea and syncope.        Leg pain    Respiratory: Negative for cough, hemoptysis, shortness of breath, sputum production and wheezing.    Hematologic/Lymphatic: Negative for bleeding problem.   Skin: Negative for color change and flushing.   Musculoskeletal: Negative for falls.   Gastrointestinal: Negative for bloating, abdominal pain, hematemesis, hematochezia, melena, nausea and vomiting.   Genitourinary: Negative for hematuria.   Neurological: Negative for dizziness and light-headedness.   Psychiatric/Behavioral: Negative for altered mental status.         ECG 12 Lead  Date/Time: 6/29/2018 1:58 PM  Performed by: PRIYANKA CAMPOS  Authorized by: PRIYANKA CAMPOS   Comparison: compared with previous ECG from 6/16/2017  Similar to previous ECG  Rhythm: sinus rhythm  Ectopy: infrequent PVCs  Conduction: left bundle branch block               Objective:     Physical Exam   Constitutional: He is oriented to person, place, and time. He appears well-developed and well-nourished. No distress.   HENT:   Head: Normocephalic and atraumatic.   Eyes: Pupils are equal, round, and reactive to light.   Neck: Normal range of motion. Neck supple. No JVD present. No thyromegaly present.   Cardiovascular: Normal rate, regular rhythm, normal heart sounds and intact distal pulses.  Exam reveals no gallop and no friction rub.    No murmur heard.  Pulmonary/Chest: Effort normal and breath sounds normal. No respiratory distress. He has no wheezes. He has no rales. He exhibits no tenderness.   Abdominal: Soft. Bowel sounds are normal. He exhibits no distension. There is no tenderness.   Musculoskeletal: Normal range of motion. He exhibits no edema.   Neurological: He is alert and oriented to  person, place, and time. No cranial nerve deficit.   Skin: Skin is warm and dry. He is not diaphoretic.   Psychiatric: He has a normal mood and affect. His behavior is normal.       Lab Review:       Assessment:          Diagnosis Plan   1. Coronary artery disease involving native coronary artery of native heart without angina pectoris  Stable. No angina     PCI to LAD 2011; LVEF 24% by cath at that time   Negative stress echo 2015    2. Chronic systolic congestive heart failure    Stage C, Class II. Compensated, euvolemic.  LVEF 35-40% by 2013 echo     3. S/P implantation of automatic cardioverter/defibrillator (AICD)  Single chamber. 6/2018 interrogation- normal function and battery life, two 6 second episodes of NSVT      4. Chronic kidney disease, unspecified CKD stage  Followed by Dr. Trevino    5. Venous insufficiency    Followed by Dr. Chavarria. Ablation of RLE GSV planned for next month     6. PVD (peripheral vascular disease)  Carotid disease followed by Dr. Chavarria- moderate L ICA stenosis, occluded R ICA      7. Essential hypertension  Controlled        8. Left bundle branch block - chronic; present on 2017 EKG as well      Plan:       Continue ASA, plavix, statin, coreg, lasix and lisinopril   Follow up 6 months, sooner with new or worsening symptoms to concerns     Reviewed signs and symptoms of CHF and what to report with the patient. Patient instructed to restrict sodium and weigh daily. Report weight gain of greater than 2 lbs overnight or 5 lbs in 1 week. Pt verbalized understanding of instructions and plan of care.

## 2018-07-02 RX ORDER — CARVEDILOL 25 MG/1
TABLET ORAL
Qty: 180 TABLET | Refills: 3 | Status: SHIPPED | OUTPATIENT
Start: 2018-07-02 | End: 2019-06-12 | Stop reason: SDUPTHER

## 2018-07-05 ENCOUNTER — TELEPHONE (OUTPATIENT)
Dept: VASCULAR SURGERY | Facility: CLINIC | Age: 68
End: 2018-07-05

## 2018-07-05 ENCOUNTER — HOSPITAL ENCOUNTER (OUTPATIENT)
Dept: GENERAL RADIOLOGY | Facility: HOSPITAL | Age: 68
Discharge: HOME | End: 2018-07-05
Admitting: NURSE PRACTITIONER

## 2018-07-05 ENCOUNTER — APPOINTMENT (OUTPATIENT)
Dept: PREADMISSION TESTING | Facility: HOSPITAL | Age: 68
End: 2018-07-05

## 2018-07-05 VITALS
BODY MASS INDEX: 27.66 KG/M2 | DIASTOLIC BLOOD PRESSURE: 60 MMHG | RESPIRATION RATE: 18 BRPM | OXYGEN SATURATION: 99 % | SYSTOLIC BLOOD PRESSURE: 133 MMHG | HEART RATE: 66 BPM | WEIGHT: 222.44 LBS | HEIGHT: 75 IN

## 2018-07-05 DIAGNOSIS — I87.2 VENOUS INSUFFICIENCY: ICD-10-CM

## 2018-07-05 DIAGNOSIS — Z79.02 ENCOUNTER FOR MONITORING ANTIPLATELET THERAPY: ICD-10-CM

## 2018-07-05 DIAGNOSIS — Z01.818 PREOP TESTING: ICD-10-CM

## 2018-07-05 DIAGNOSIS — Z51.81 ENCOUNTER FOR MONITORING ANTIPLATELET THERAPY: ICD-10-CM

## 2018-07-05 LAB
ANION GAP SERPL CALCULATED.3IONS-SCNC: 14 MMOL/L (ref 4–13)
APTT PPP: 35.2 SECONDS (ref 24.1–34.8)
BASOPHILS # BLD AUTO: 0.03 10*3/MM3 (ref 0–0.2)
BASOPHILS NFR BLD AUTO: 0.3 % (ref 0–2)
BUN BLD-MCNC: 63 MG/DL (ref 5–21)
BUN/CREAT SERPL: 31.3 (ref 7–25)
CALCIUM SPEC-SCNC: 9.5 MG/DL (ref 8.4–10.4)
CHLORIDE SERPL-SCNC: 102 MMOL/L (ref 98–110)
CO2 SERPL-SCNC: 25 MMOL/L (ref 24–31)
CREAT BLD-MCNC: 2.01 MG/DL (ref 0.5–1.4)
DEPRECATED RDW RBC AUTO: 49.5 FL (ref 40–54)
EOSINOPHIL # BLD AUTO: 0.59 10*3/MM3 (ref 0–0.7)
EOSINOPHIL NFR BLD AUTO: 5.3 % (ref 0–4)
ERYTHROCYTE [DISTWIDTH] IN BLOOD BY AUTOMATED COUNT: 14.6 % (ref 12–15)
GFR SERPL CREATININE-BSD FRML MDRD: 33 ML/MIN/1.73
GLUCOSE BLD-MCNC: 133 MG/DL (ref 70–100)
HCT VFR BLD AUTO: 30.8 % (ref 40–52)
HGB BLD-MCNC: 10.3 G/DL (ref 14–18)
IMM GRANULOCYTES # BLD: 0.04 10*3/MM3 (ref 0–0.03)
IMM GRANULOCYTES NFR BLD: 0.4 % (ref 0–5)
INR PPP: 1.04 (ref 0.91–1.09)
LYMPHOCYTES # BLD AUTO: 2.32 10*3/MM3 (ref 0.72–4.86)
LYMPHOCYTES NFR BLD AUTO: 21 % (ref 15–45)
MCH RBC QN AUTO: 31.6 PG (ref 28–32)
MCHC RBC AUTO-ENTMCNC: 33.4 G/DL (ref 33–36)
MCV RBC AUTO: 94.5 FL (ref 82–95)
MONOCYTES # BLD AUTO: 1.01 10*3/MM3 (ref 0.19–1.3)
MONOCYTES NFR BLD AUTO: 9.1 % (ref 4–12)
NEUTROPHILS # BLD AUTO: 7.07 10*3/MM3 (ref 1.87–8.4)
NEUTROPHILS NFR BLD AUTO: 63.9 % (ref 39–78)
NRBC BLD MANUAL-RTO: 0 /100 WBC (ref 0–0)
PLATELET # BLD AUTO: 439 10*3/MM3 (ref 130–400)
PMV BLD AUTO: 12.5 FL (ref 6–12)
POTASSIUM BLD-SCNC: 4.4 MMOL/L (ref 3.5–5.3)
PROTHROMBIN TIME: 13.9 SECONDS (ref 11.9–14.6)
RBC # BLD AUTO: 3.26 10*6/MM3 (ref 4.8–5.9)
SODIUM BLD-SCNC: 141 MMOL/L (ref 135–145)
WBC NRBC COR # BLD: 11.06 10*3/MM3 (ref 4.8–10.8)

## 2018-07-05 PROCEDURE — 36415 COLL VENOUS BLD VENIPUNCTURE: CPT

## 2018-07-05 PROCEDURE — 71046 X-RAY EXAM CHEST 2 VIEWS: CPT

## 2018-07-05 PROCEDURE — 93010 ELECTROCARDIOGRAM REPORT: CPT | Performed by: INTERNAL MEDICINE

## 2018-07-05 PROCEDURE — 85610 PROTHROMBIN TIME: CPT | Performed by: NURSE PRACTITIONER

## 2018-07-05 PROCEDURE — 85025 COMPLETE CBC W/AUTO DIFF WBC: CPT | Performed by: NURSE PRACTITIONER

## 2018-07-05 PROCEDURE — 93005 ELECTROCARDIOGRAM TRACING: CPT

## 2018-07-05 PROCEDURE — 85730 THROMBOPLASTIN TIME PARTIAL: CPT | Performed by: NURSE PRACTITIONER

## 2018-07-05 PROCEDURE — 80048 BASIC METABOLIC PNL TOTAL CA: CPT | Performed by: NURSE PRACTITIONER

## 2018-07-05 NOTE — TELEPHONE ENCOUNTER
Per pre-work nurse Ashley patients having abnormal ECG's. 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 Chavarria on 7-11-18. Patient has a cardiac history and is already a patient of .

## 2018-07-05 NOTE — DISCHARGE INSTRUCTIONS
DAY OF SURGERY INSTRUCTIONS        YOUR SURGEON: Dave Chavarria    PROCEDURE: Right Saphenous Vein Radio Frequency Ablation     DATE OF SURGERY: July 11, 2018    ARRIVAL TIME: AS DIRECTED BY OFFICE    DAY OF SURGERY TAKE ONLY THESE MEDICATIONS UNLESS OTHERWISE INSTRUCTED BY YOUR PHYSICIAN: Carvedilol         MANAGING PAIN AFTER SURGERY    We know you are probably wondering what your pain will be like after surgery.  Following surgery it is unrealistic to expect you will not have pain.   Pain is how our bodies let us know that something is wrong or cautions us to be careful.  That said, our goal is to make your pain tolerable.    Methods we may use to treat your pain include (oral or IV medications, PCAs, epidurals, nerve blocks, etc.)   While some procedures require IV pain medications for a short time after surgery, transitioning to pain medications by mouth allows for better management of pain.   Your nurse will encourage you to take oral pain medications whenever possible.  IV medications work almost immediately, but only last a short while.  Taking medications by mouth allows for a more constant level of medication in your blood stream for a longer period of time.      Once your pain is out of control it is harder to get back under control.  It is important you are aware when your next dose of pain medication is due.  If you are admitted, your nurse may write the time of your next dose on the white board in your room to help you remember.      We are interested in your pain and encourage you to inform us about aggravating factors during your visit.   Many times a simple repositioning every few hours can make a big difference.    If your physician says it is okay, do not let your pain prevent you from getting out of bed. Be sure to call your nurse for assistance prior to getting up so you do not fall.      Before surgery, please decide your tolerable pain goal.  These faces help describe the pain ratings we  use on a 0-10 scale.   Be prepared to tell us your goal and whether or not you take pain or anxiety medications at home.          BEFORE YOU COME TO THE HOSPITAL  (Pre-op instructions)  • Do not eat, drink, smoke or chew gum after midnight the night before surgery.  This also includes no mints.  • Morning of surgery take only the medicines you have been instructed with a sip of water unless otherwise instructed  by your physician.  • Do not shave, wear makeup or dark nail polish.  • Remove all jewelry including rings.  • Leave anything you consider valuable at home.  • Leave your suitcase in the car until after your surgery.  • Bring the following with you if applicable:  o Picture ID and insurance, Medicare or Medicaid cards  o Co-pay/deductible required by insurance (cash, check, credit card)  o Copy of advance directive, living will or power-of- documents if not brought to PAT  o CPAP or BIPAP mask and tubing  o Relaxation aids (MP3 player, book, magazine)  • On the day of surgery check in at registration located at the main entrance of the hospital.       Outpatient Surgery Guidelines, Adult  Outpatient procedures are those for which the person having the procedure is allowed to go home the same day as the procedure. Various procedures are done on an outpatient basis. You should follow some general guidelines if you will be having an outpatient procedure.  LET YOUR HEALTH CARE PROVIDER KNOW ABOUT:  · Any allergies you have.  · All medicines you are taking, including vitamins, herbs, eye drops, creams, and over-the-counter medicines.  · Previous problems you or members of your family have had with the use of anesthetics.  · Any blood disorders you have.  · Previous surgeries you have had.  · Medical conditions you have.  RISKS AND COMPLICATIONS  Your health care provider will discuss possible risks and complications with you before surgery. Common risks and complications include:    · Problems due to the  use of anesthetics.  · Blood loss and replacement (does not apply to minor surgical procedures).  · Temporary increase in pain due to surgery.  · Uncorrected pain or problems that the surgery was meant to correct.  · Infection.  · New damage.  BEFORE THE PROCEDURE  · Ask your health care provider about changing or stopping your regular medicines. You may need to stop taking certain medicines in the days or weeks before the procedure.  · Stop smoking at least 2 weeks before surgery. This lowers your risk for complications during and after surgery. Ask your health care provider for help with this if needed.  · Eat your usual meals and a light supper the day before surgery. Continue fluid intake. Do not drink alcohol.  · Do not eat or drink after midnight the night before your surgery.   · Arrange for someone to take you home and to stay with you for 24 hours after the procedure. Medicine given for your procedure may affect your ability to drive or to care for yourself.  · Call your health care provider's office if you develop an illness or problem that may prevent you from safely having your procedure.  AFTER THE PROCEDURE  After surgery, you will be taken to a recovery area, where your progress will be monitored. If there are no complications, you will be allowed to go home when you are awake, stable, and taking fluids well. You may have numbness around the surgical site. Healing will take some time. You will have tenderness at the surgical site and may have some swelling and bruising. You may also have some nausea.  HOME CARE INSTRUCTIONS  · Do not drive for 24 hours, or as directed by your health care provider. Do not drive while taking prescription pain medicines.  · Do not drink alcohol for 24 hours.  · Do not make important decisions or sign legal documents for 24 hours.  · You may resume a normal diet and activities as directed.  · Do not lift anything heavier than 10 pounds (4.5 kg) or play contact sports  until your health care provider says it is okay.  · Change your bandages (dressings) as directed.  · Only take over-the-counter or prescription medicines as directed by your health care provider.  · Follow up with your health care provider as directed.  SEEK MEDICAL CARE IF:  · You have increased bleeding (more than a small spot) from the surgical site.  · You have redness, swelling, or increasing pain in the wound.  · You see pus coming from the wound.  · You have a fever.  · You notice a bad smell coming from the wound or dressing.  · You feel lightheaded or faint.  · You develop a rash.  · You have trouble breathing.  · You develop allergies.  MAKE SURE YOU:  · Understand these instructions.  · Will watch your condition.  · Will get help right away if you are not doing well or get worse.     This information is not intended to replace advice given to you by your health care provider. Make sure you discuss any questions you have with your health care provider.     Document Released: 09/12/2002 Document Revised: 05/03/2016 Document Reviewed: 05/22/2014  [employer] Interactive Patient Education ©2016 [employer]       Fall Prevention in Hospitals, Adult  As a hospital patient, your condition and the treatments you receive can increase your risk for falls. Some additional risk factors for falls in a hospital include:  · Being in an unfamiliar environment.  · Being on bed rest.  · Your surgery.  · Taking certain medicines.  · Your tubing requirements, such as intravenous (IV) therapy or catheters.  It is important that you learn how to decrease fall risks while at the hospital. Below are important tips that can help prevent falls.  SAFETY TIPS FOR PREVENTING FALLS  Talk about your risk of falling.  · Ask your health care provider why you are at risk for falling. Is it your medicine, illness, tubing placement, or something else?  · Make a plan with your health care provider to keep you safe from falls.  · Ask your  health care provider or pharmacist about side effects of your medicines. Some medicines can make you dizzy or affect your coordination.  Ask for help.  · Ask for help before getting out of bed. You may need to press your call button.  · Ask for assistance in getting safely to the toilet.  · Ask for a walker or cane to be put at your bedside. Ask that most of the side rails on your bed be placed up before your health care provider leaves the room.  · Ask family or friends to sit with you.  · Ask for things that are out of your reach, such as your glasses, hearing aids, telephone, bedside table, or call button.  Follow these tips to avoid falling:  · Stay lying or seated, rather than standing, while waiting for help.  · Wear rubber-soled slippers or shoes whenever you walk in the hospital.  · Avoid quick, sudden movements.  ¨ Change positions slowly.  ¨ Sit on the side of your bed before standing.  ¨ Stand up slowly and wait before you start to walk.  · Let your health care provider know if there is a spill on the floor.  · Pay careful attention to the medical equipment, electrical cords, and tubes around you.  · When you need help, use your call button by your bed or in the bathroom. Wait for one of your health care providers to help you.  · If you feel dizzy or unsure of your footing, return to bed and wait for assistance.  · Avoid being distracted by the TV, telephone, or another person in your room.  · Do not lean or support yourself on rolling objects, such as IV poles or bedside tables.     This information is not intended to replace advice given to you by your health care provider. Make sure you discuss any questions you have with your health care provider.     Document Released: 12/15/2001 Document Revised: 01/08/2016 Document Reviewed: 08/25/2013  [employer] Interactive Patient Education ©2016 [employer]       Surgical Site Infections FAQs  What is a Surgical Site Infection (SSI)?  A surgical site infection  is an infection that occurs after surgery in the part of the body where the surgery took place. Most patients who have surgery do not develop an infection. However, infections develop in about 1 to 3 out of every 100 patients who have surgery.  Some of the common symptoms of a surgical site infection are:  · Redness and pain around the area where you had surgery  · Drainage of cloudy fluid from your surgical wound  · Fever  Can SSIs be treated?  Yes. Most surgical site infections can be treated with antibiotics. The antibiotic given to you depends on the bacteria (germs) causing the infection. Sometimes patients with SSIs also need another surgery to treat the infection.  What are some of the things that hospitals are doing to prevent SSIs?  To prevent SSIs, doctors, nurses, and other healthcare providers:  · Clean their hands and arms up to their elbows with an antiseptic agent just before the surgery.  · Clean their hands with soap and water or an alcohol-based hand rub before and after caring for each patient.  · May remove some of your hair immediately before your surgery using electric clippers if the hair is in the same area where the procedure will occur. They should not shave you with a razor.  · Wear special hair covers, masks, gowns, and gloves during surgery to keep the surgery area clean.  · Give you antibiotics before your surgery starts. In most cases, you should get antibiotics within 60 minutes before the surgery starts and the antibiotics should be stopped within 24 hours after surgery.  · Clean the skin at the site of your surgery with a special soap that kills germs.  What can I do to help prevent SSIs?  Before your surgery:  · Tell your doctor about other medical problems you may have. Health problems such as allergies, diabetes, and obesity could affect your surgery and your treatment.  · Quit smoking. Patients who smoke get more infections. Talk to your doctor about how you can quit before your  surgery.  · Do not shave near where you will have surgery. Shaving with a razor can irritate your skin and make it easier to develop an infection.  At the time of your surgery:  · Speak up if someone tries to shave you with a razor before surgery. Ask why you need to be shaved and talk with your surgeon if you have any concerns.  · Ask if you will get antibiotics before surgery.  After your surgery:  · Make sure that your healthcare providers clean their hands before examining you, either with soap and water or an alcohol-based hand rub.  · If you do not see your providers clean their hands, please ask them to do so.  · Family and friends who visit you should not touch the surgical wound or dressings.  · Family and friends should clean their hands with soap and water or an alcohol-based hand rub before and after visiting you. If you do not see them clean their hands, ask them to clean their hands.  What do I need to do when I go home from the hospital?  · Before you go home, your doctor or nurse should explain everything you need to know about taking care of your wound. Make sure you understand how to care for your wound before you leave the hospital.  · Always clean your hands before and after caring for your wound.  · Before you go home, make sure you know who to contact if you have questions or problems after you get home.  · If you have any symptoms of an infection, such as redness and pain at the surgery site, drainage, or fever, call your doctor immediately.  If you have additional questions, please ask your doctor or nurse.  Developed and co-sponsored by The Society for Healthcare Epidemiology of [place] (SHEA); Infectious Diseases Society of [place] (IDSA); American Hospital Association; Association for Professionals in Infection Control and Epidemiology (APIC); Centers for Disease Control and Prevention (CDC); and The Joint Commission.     This information is not intended to replace advice given to you by  your health care provider. Make sure you discuss any questions you have with your health care provider.     Document Released: 12/23/2014 Document Revised: 01/08/2016 Document Reviewed: 03/02/2016  [employer] Interactive Patient Education ©2016 [employer]       [facility]  CHG 4% Patient Instruction Sheet    Preparing the Skin Before Surgery  Preparing or “prepping” skin before surgery can reduce the risk of infection at the surgical site. To make the process easier,[facility] has chosen 4% Chlorhexidine Gluconate (CHG) antiseptic solution.   The steps below outline the prepping process and should be carefully followed.                                                                                                                                                      Prep the skin at the following time(s):                                                      We recommend you shower the night before surgery, and again the morning of surgery with the 4% CHG antiseptic solution using half of the bottle and a cloth each time.  Dress in clean clothes/sleepwear after showering.  See instructions below for application.          Do not apply any lotions or moisturizers.       Do not shave the area to be prepped for at least 2 days prior to surgery.    Clipping the hair may be done immediately prior to your surgery at the hospital    if needed.    Directions:  Thoroughly rinse your body with water.  Apply 4% CHG to a cloth and wash skin gently, paying special attention to the operative site.  Rinse again thoroughly.  Once you have begun using this product do not apply anything else to your skin. If itching or redness persists, rinse affected areas and discontinue use.    When using this product:  • Keep out of eyes, ears, and mouth.  • If solution should contact these areas, rinse out promptly and thoroughly with water.  • For external use only.  • Do not use in genital area, on your face or  head.      PATIENT/FAMILY/RESPONSIBLE PARTY VERBALIZES UNDERSTANDING OF ABOVE EDUCATION.  COPY OF PAIN SCALE GIVEN AND REVIEWED WITH VERBALIZED UNDERSTANDING.

## 2018-07-10 ENCOUNTER — ANESTHESIA EVENT (OUTPATIENT)
Dept: PERIOP | Facility: HOSPITAL | Age: 68
End: 2018-07-10

## 2018-07-10 ENCOUNTER — TELEPHONE (OUTPATIENT)
Dept: VASCULAR SURGERY | Facility: CLINIC | Age: 68
End: 2018-07-10

## 2018-07-10 NOTE — TELEPHONE ENCOUNTER
Spoke to patient and advised that his arrival time had been moved from 7 am to 6 am.  Patient expressed understanding for all that was discussed.

## 2018-07-11 ENCOUNTER — HOSPITAL ENCOUNTER (OUTPATIENT)
Facility: HOSPITAL | Age: 68
Setting detail: HOSPITAL OUTPATIENT SURGERY
Discharge: HOME | End: 2018-07-11
Attending: SURGERY | Admitting: SURGERY

## 2018-07-11 ENCOUNTER — DOCUMENTATION (OUTPATIENT)
Dept: VASCULAR SURGERY | Facility: CLINIC | Age: 68
End: 2018-07-11

## 2018-07-11 ENCOUNTER — APPOINTMENT (OUTPATIENT)
Dept: ULTRASOUND IMAGING | Facility: HOSPITAL | Age: 68
End: 2018-07-11

## 2018-07-11 ENCOUNTER — ANESTHESIA (OUTPATIENT)
Dept: PERIOP | Facility: HOSPITAL | Age: 68
End: 2018-07-11

## 2018-07-11 VITALS
OXYGEN SATURATION: 99 % | SYSTOLIC BLOOD PRESSURE: 110 MMHG | RESPIRATION RATE: 18 BRPM | TEMPERATURE: 97.7 F | HEART RATE: 85 BPM | DIASTOLIC BLOOD PRESSURE: 69 MMHG

## 2018-07-11 DIAGNOSIS — I87.2 VENOUS INSUFFICIENCY: ICD-10-CM

## 2018-07-11 DIAGNOSIS — I87.391 CHRONIC VENOUS HYPERTENSION (IDIOPATHIC) WITH OTHER COMPLICATIONS OF RIGHT LOWER EXTREMITY: ICD-10-CM

## 2018-07-11 DIAGNOSIS — Z01.818 PREOP TESTING: ICD-10-CM

## 2018-07-11 LAB
ABO GROUP BLD: NORMAL
BLD GP AB SCN SERPL QL: NEGATIVE
RH BLD: POSITIVE
T&S EXPIRATION DATE: NORMAL

## 2018-07-11 PROCEDURE — 25010000002 PROPOFOL 10 MG/ML EMULSION: Performed by: NURSE ANESTHETIST, CERTIFIED REGISTERED

## 2018-07-11 PROCEDURE — 25010000002 MIDAZOLAM PER 1 MG: Performed by: NURSE ANESTHETIST, CERTIFIED REGISTERED

## 2018-07-11 PROCEDURE — 25010000002 PROPOFOL 1000 MG/ML EMULSION: Performed by: NURSE ANESTHETIST, CERTIFIED REGISTERED

## 2018-07-11 PROCEDURE — 25010000002 FENTANYL CITRATE (PF) 100 MCG/2ML SOLUTION: Performed by: NURSE ANESTHETIST, CERTIFIED REGISTERED

## 2018-07-11 PROCEDURE — 76937 US GUIDE VASCULAR ACCESS: CPT

## 2018-07-11 PROCEDURE — 86901 BLOOD TYPING SEROLOGIC RH(D): CPT | Performed by: NURSE PRACTITIONER

## 2018-07-11 PROCEDURE — C1894 INTRO/SHEATH, NON-LASER: HCPCS | Performed by: SURGERY

## 2018-07-11 PROCEDURE — 36475 ENDOVENOUS RF 1ST VEIN: CPT | Performed by: SURGERY

## 2018-07-11 PROCEDURE — 25010000002 PHENYLEPHRINE PER 1 ML: Performed by: NURSE ANESTHETIST, CERTIFIED REGISTERED

## 2018-07-11 PROCEDURE — 94799 UNLISTED PULMONARY SVC/PX: CPT

## 2018-07-11 PROCEDURE — 86850 RBC ANTIBODY SCREEN: CPT | Performed by: NURSE PRACTITIONER

## 2018-07-11 PROCEDURE — 86900 BLOOD TYPING SEROLOGIC ABO: CPT | Performed by: NURSE PRACTITIONER

## 2018-07-11 PROCEDURE — C1888 ENDOVAS NON-CARDIAC ABL CATH: HCPCS | Performed by: SURGERY

## 2018-07-11 RX ORDER — METOCLOPRAMIDE HYDROCHLORIDE 5 MG/ML
5 INJECTION INTRAMUSCULAR; INTRAVENOUS
Status: CANCELLED | OUTPATIENT
Start: 2018-07-11

## 2018-07-11 RX ORDER — FENTANYL CITRATE 50 UG/ML
INJECTION, SOLUTION INTRAMUSCULAR; INTRAVENOUS AS NEEDED
Status: DISCONTINUED | OUTPATIENT
Start: 2018-07-11 | End: 2018-07-11 | Stop reason: SURG

## 2018-07-11 RX ORDER — OXYCODONE AND ACETAMINOPHEN 7.5; 325 MG/1; MG/1
2 TABLET ORAL ONCE AS NEEDED
Status: CANCELLED | OUTPATIENT
Start: 2018-07-11

## 2018-07-11 RX ORDER — IBUPROFEN 600 MG/1
600 TABLET ORAL ONCE AS NEEDED
Status: CANCELLED | OUTPATIENT
Start: 2018-07-11

## 2018-07-11 RX ORDER — ONDANSETRON 2 MG/ML
4 INJECTION INTRAMUSCULAR; INTRAVENOUS ONCE AS NEEDED
Status: DISCONTINUED | OUTPATIENT
Start: 2018-07-11 | End: 2018-07-11 | Stop reason: HOSPADM

## 2018-07-11 RX ORDER — LABETALOL HYDROCHLORIDE 5 MG/ML
5 INJECTION, SOLUTION INTRAVENOUS
Status: CANCELLED | OUTPATIENT
Start: 2018-07-11

## 2018-07-11 RX ORDER — HYDROCODONE BITARTRATE AND ACETAMINOPHEN 5; 325 MG/1; MG/1
1 TABLET ORAL ONCE AS NEEDED
Status: DISCONTINUED | OUTPATIENT
Start: 2018-07-11 | End: 2018-07-11 | Stop reason: HOSPADM

## 2018-07-11 RX ORDER — NALOXONE HCL 0.4 MG/ML
0.4 VIAL (ML) INJECTION AS NEEDED
Status: CANCELLED | OUTPATIENT
Start: 2018-07-11

## 2018-07-11 RX ORDER — SODIUM CHLORIDE 9 MG/ML
INJECTION, SOLUTION INTRAVENOUS AS NEEDED
Status: DISCONTINUED | OUTPATIENT
Start: 2018-07-11 | End: 2018-07-11 | Stop reason: HOSPADM

## 2018-07-11 RX ORDER — SODIUM CHLORIDE 0.9 % (FLUSH) 0.9 %
3 SYRINGE (ML) INJECTION AS NEEDED
Status: DISCONTINUED | OUTPATIENT
Start: 2018-07-11 | End: 2018-07-11 | Stop reason: HOSPADM

## 2018-07-11 RX ORDER — SODIUM CHLORIDE 9 MG/ML
100 INJECTION, SOLUTION INTRAVENOUS CONTINUOUS
Status: DISCONTINUED | OUTPATIENT
Start: 2018-07-11 | End: 2018-07-11 | Stop reason: HOSPADM

## 2018-07-11 RX ORDER — FENTANYL CITRATE 50 UG/ML
25 INJECTION, SOLUTION INTRAMUSCULAR; INTRAVENOUS AS NEEDED
Status: CANCELLED | OUTPATIENT
Start: 2018-07-11

## 2018-07-11 RX ORDER — OXYCODONE AND ACETAMINOPHEN 10; 325 MG/1; MG/1
1 TABLET ORAL ONCE AS NEEDED
Status: CANCELLED | OUTPATIENT
Start: 2018-07-11

## 2018-07-11 RX ORDER — IPRATROPIUM BROMIDE AND ALBUTEROL SULFATE 2.5; .5 MG/3ML; MG/3ML
3 SOLUTION RESPIRATORY (INHALATION) ONCE AS NEEDED
Status: CANCELLED | OUTPATIENT
Start: 2018-07-11

## 2018-07-11 RX ORDER — MIDAZOLAM HYDROCHLORIDE 1 MG/ML
INJECTION INTRAMUSCULAR; INTRAVENOUS AS NEEDED
Status: DISCONTINUED | OUTPATIENT
Start: 2018-07-11 | End: 2018-07-11 | Stop reason: SURG

## 2018-07-11 RX ORDER — SODIUM CHLORIDE, SODIUM LACTATE, POTASSIUM CHLORIDE, CALCIUM CHLORIDE 600; 310; 30; 20 MG/100ML; MG/100ML; MG/100ML; MG/100ML
1000 INJECTION, SOLUTION INTRAVENOUS CONTINUOUS
Status: DISCONTINUED | OUTPATIENT
Start: 2018-07-11 | End: 2018-07-11 | Stop reason: HOSPADM

## 2018-07-11 RX ORDER — PROPOFOL 10 MG/ML
VIAL (ML) INTRAVENOUS AS NEEDED
Status: DISCONTINUED | OUTPATIENT
Start: 2018-07-11 | End: 2018-07-11 | Stop reason: SURG

## 2018-07-11 RX ORDER — SODIUM CHLORIDE 0.9 % (FLUSH) 0.9 %
1-10 SYRINGE (ML) INJECTION AS NEEDED
Status: DISCONTINUED | OUTPATIENT
Start: 2018-07-11 | End: 2018-07-11 | Stop reason: HOSPADM

## 2018-07-11 RX ORDER — MEPERIDINE HYDROCHLORIDE 50 MG/ML
12.5 INJECTION INTRAMUSCULAR; INTRAVENOUS; SUBCUTANEOUS
Status: CANCELLED | OUTPATIENT
Start: 2018-07-11 | End: 2018-07-12

## 2018-07-11 RX ORDER — ONDANSETRON 2 MG/ML
4 INJECTION INTRAMUSCULAR; INTRAVENOUS ONCE AS NEEDED
Status: CANCELLED | OUTPATIENT
Start: 2018-07-11

## 2018-07-11 RX ADMIN — SODIUM CHLORIDE, POTASSIUM CHLORIDE, SODIUM LACTATE AND CALCIUM CHLORIDE 1000 ML: 600; 310; 30; 20 INJECTION, SOLUTION INTRAVENOUS at 08:10

## 2018-07-11 RX ADMIN — MIDAZOLAM HYDROCHLORIDE 2 MG: 1 INJECTION, SOLUTION INTRAMUSCULAR; INTRAVENOUS at 08:50

## 2018-07-11 RX ADMIN — PHENYLEPHRINE HYDROCHLORIDE 160 MCG: 10 INJECTION INTRAVENOUS at 09:16

## 2018-07-11 RX ADMIN — LIDOCAINE HYDROCHLORIDE 0.5 ML: 10 INJECTION, SOLUTION EPIDURAL; INFILTRATION; INTRACAUDAL; PERINEURAL at 08:10

## 2018-07-11 RX ADMIN — PHENYLEPHRINE HYDROCHLORIDE 160 MCG: 10 INJECTION INTRAVENOUS at 09:10

## 2018-07-11 RX ADMIN — PROPOFOL 100 MG: 10 INJECTION, EMULSION INTRAVENOUS at 08:50

## 2018-07-11 RX ADMIN — PROPOFOL 150 MCG/KG/MIN: 10 INJECTION, EMULSION INTRAVENOUS at 08:55

## 2018-07-11 RX ADMIN — PHENYLEPHRINE HYDROCHLORIDE 160 MCG: 10 INJECTION INTRAVENOUS at 09:06

## 2018-07-11 RX ADMIN — PHENYLEPHRINE HYDROCHLORIDE 160 MCG: 10 INJECTION INTRAVENOUS at 09:00

## 2018-07-11 RX ADMIN — FENTANYL CITRATE 100 MCG: 50 INJECTION, SOLUTION INTRAMUSCULAR; INTRAVENOUS at 08:50

## 2018-07-11 NOTE — ANESTHESIA POSTPROCEDURE EVALUATION
Patient: Jadon Flynn    Procedure Summary     Date:  07/11/18 Room / Location:  [facility] OR  / [facility] HYBRID OR 12    Anesthesia Start:  0850 Anesthesia Stop:  0936    Procedure:  RIGHT SAPHENOUS VEIN RADIO FREQUENCY ABLATION (Right Leg Lower) Diagnosis:       Venous insufficiency      Preop testing      (Venous insufficiency [I87.2])      (Preop testing [Z01.818])    Provider:  Dave Chavarria DO Provider:  [place] CRNA    Anesthesia Type:  general ASA Status:  3          Anesthesia Type: general  Last vitals  BP   110/69 (07/11/18 1215)   Temp   97.7 °F (36.5 °C) (07/11/18 0940)   Pulse   85 (07/11/18 1215)   Resp   18 (07/11/18 1215)     SpO2   99 % (07/11/18 1215)     Post Anesthesia Care and Evaluation    Patient location during evaluation: PHASE II  Patient participation: complete - patient participated  Level of consciousness: awake and awake and alert  Pain score: 0  Pain management: adequate  Airway patency: patent  Anesthetic complications: No anesthetic complications  PONV Status: none  Cardiovascular status: acceptable  Respiratory status: acceptable  Hydration status: acceptable    Comments: Patient discharged according to acceptable [place] score per RN assessment. See nursing records for further information.     Blood pressure 110/69, pulse 85, temperature 97.7 °F (36.5 °C), temperature source Temporal Artery , resp. rate 18, SpO2 99 %.

## 2018-07-11 NOTE — OP NOTE
Jadon Flynn  7/11/2018     PREOPERATIVE DIAGNOSIS: Venous insufficiency [I87.2]  Preop testing [Z01.818]     POSTOPERATIVE DIAGNOSIS: Post-Op Diagnosis Codes:     * Venous insufficiency [I87.2]     * Preop testing [Z01.818]     PROCEDURE PERFORMED:   1.  Ultrasound-guided cannulation of the right lower extremity greater saphenous vein  2.  Radiofrequency ablation of the right lower extremity greater saphenous vein     SURGEON: Dave Chavarria DO      ANESTHESIA: Mac    PREPARATION: Routine.    STAFF: Circulator: Celestina Romero RN  Scrub Person: Chen Hicks  Assistant: Nohelia Hansen  Vascular Ultrasound Technician: Yanely Amin    ESTIMATED BLOOD LOSS: 5 ML    SPECIMENS: None    COMPLICATIONS: None    INDICATIONS: Jadon Flynn is a 67 y.o. male who has complaints of muscle twitching and cramping to his legs at night.  He has been wearing compression stockings, but did not feel any benefit.  He had noninvasive testing performed today in which I personally reviewed.  The indications, risks, and possible complications of the procedure were explained to the patient, who voiced understanding and wished to proceed with surgery.     PROCEDURE IN DETAIL: The patient was taken to the operating room and placed on the operating table in a supine position. After Mac anesthesia was obtained, the right lower extremity was prepped and draped in a sterile manner.  Under ultrasound guidance and using a micro-puncture technique the right lower extremity greater saphenous vein was cannulated just below the knee.  The microwire was placed.  This was confirmed under ultrasound.  A small stab incision was made with 11 blade and a 7 Bengali sheath was placed.  The patient was placed in Trendelenburg position.  The saphenofemoral junction was identified under ultrasound.  The radio frequency ablation catheter was placed through the sheath and up to the saphenofemoral junction and identified.  It was pulled  back 3 cm and marked.  Next, tumescent fluid was instilled along the entire length of the vein under ultrasound guidance.  Once sufficient tumescent fluid was placed the radiofrequency ablation had commenced.  There was a total of 8 RF cycles for a total treatment length of 46 cm for a total treatment time of 2 minutes and 40 seconds.  There was an average of 15 W at an average temperature 120°C.  Upon completion of the ablation the catheter and sheath were removed.  Direct pressure was held for approximately 5 minutes of ensure hemostasis.  An Ace wrap was placed from the toes to the groin.  Sterile dressings were applied. The patient tolerated the procedure well. Sponge and needle counts were correct. The patient was then awakened and transferred back to the outpatient center in stable condition.  Dave Chavarria,   Date: 7/11/2018 Time: 9:27 AM     CC:Alli Perry MD

## 2018-07-11 NOTE — BRIEF OP NOTE
SAPHENOUS VEIN RADIO FREQUENCY ABLATION  Progress Note    Jadon Flynn  7/11/2018    Pre-op Diagnosis:   Venous insufficiency [I87.2]  Preop testing [Z01.818]       Post-Op Diagnosis Codes:     * Venous insufficiency [I87.2]     * Preop testing [Z01.818]    Procedure/CPT® Codes:      Procedure(s):  RIGHT SAPHENOUS VEIN RADIO FREQUENCY ABLATION    Surgeon(s):  Dave Chavarria DO    Anesthesia: Monitor Anesthesia Care    Staff:   Circulator: Celestina Romero RN  Scrub Person: Chen Hicks  Assistant: Nohelia Hansen  Vascular Ultrasound Technician: Yanely Amin    Estimated Blood Loss: 5ml    Urine Voided: * No values recorded between 7/11/2018  8:47 AM and 7/11/2018  9:23 AM *    Specimens:                None        Complications: none      Dave Chavarria DO     Date: 7/11/2018  Time: 9:23 AM

## 2018-07-11 NOTE — ANESTHESIA PREPROCEDURE EVALUATION
Anesthesia Evaluation     Patient summary reviewed   no history of anesthetic complications:  NPO Solid Status: > 8 hours  NPO Liquid Status: > 8 hours           Airway   Mallampati: I  TM distance: >3 FB  Neck ROM: full  No difficulty expected  Dental - normal exam     Pulmonary - normal exam   (+) a smoker Former,   Cardiovascular - normal exam  Exercise tolerance: good (4-7 METS)    (+) hypertension well controlled 2 medications or greater, CAD, cardiac stents more than 12 months ago CHF, PVD, hyperlipidemia,  carotid artery disease left carotid      Neuro/Psych  (+) CVA,     GI/Hepatic/Renal/Endo    (+)   renal disease,     Musculoskeletal (-) negative ROS    Abdominal  - normal exam   Substance History - negative use     OB/GYN          Other - negative ROS                       Anesthesia Plan    ASA 3     general     intravenous induction   Anesthetic plan and risks discussed with patient.

## 2018-07-11 NOTE — H&P (VIEW-ONLY)
06/26/2018       Alli Perry MD  [address] [facility] [address]        Jadon Flynn  1950    Chief Complaint   Patient presents with   • Follow-up     US Venous Doppler Lower Extremity Bilateral this AM. Pt denies any stoke like symptoms. Pt c/o a twitching and tightening feeling in bilat lower legs.       Dear Alli Perry MD:    HPI     I had the pleasure of seeing you patient in the office today for follow up.  As you recall, the patient is a 67 y.o. male who we are currently following for carotid occlusive disease.  He is status post left carotid endarterectomy and has a known right carotid occlusion.  Currently, he appears to be doing quite well and has no specific complaints.  He remains asymptomatic from a strokelike standpoint. He has complaints of muscle twitching and cramping to his legs at night.  He has been wearing compression stockings, but did not feel any benefit.  He had noninvasive testing performed today in which I personally reviewed.      Past Medical History:   Diagnosis Date   • Asymptomatic bilateral carotid artery stenosis    • CAD (coronary artery disease) 12/07/2016   • CAD in native artery      2009 stents   • Carotid artery disease    • CHF (congestive heart failure) 12/07/2016   • Chronic combined systolic and diastolic CHF (congestive heart failure)     echo 5/2013 EF 35-40%   • Chronic systolic CHF (congestive heart failure), NYHA class 2    • CKD (chronic kidney disease) 12/07/2016   • CKD (chronic kidney disease), stage III    • Essential hypertension    • Gout    • HLD (hyperlipidemia) 12/07/2016   • HTN (hypertension) 12/07/2016   • Hyperkalemia    • Hyperlipidemia, unspecified    • Ischemic heart disease 12/07/2016   • Ischemic heart disease    • Peripheral vascular disease    • PVD (peripheral vascular disease) 12/07/2016   • S/P implantation of automatic cardioverter/defibrillator (AICD) 12/07/2016   • S/P implantation of automatic  cardioverter/defibrillator (AICD)    • Stroke      Past Surgical History:   Procedure Laterality Date   • CARDIAC DEFIBRILLATOR PLACEMENT     • CAROTID ENDARTERECTOMY     • CARPAL TUNNEL RELEASE Right    • CORONARY STENT PLACEMENT     • HYDROCELE EXCISION / REPAIR     • INSERT / REPLACE / REMOVE PACEMAKER     • TOTAL KNEE ARTHROPLASTY Left      Family History   Problem Relation Age of Onset   • Cancer Father    • Heart disease Mother    • Diabetes Mother    • Sleep apnea Mother    • Hypertension Mother    • Coronary artery disease Other      Social History   Substance Use Topics   • Smoking status: Former Smoker     Quit date: 2010   • Smokeless tobacco: Never Used   • Alcohol use No     .allrgy    Current Outpatient Prescriptions:   •  allopurinol (ZYLOPRIM) 100 MG tablet, Take 100 mg by mouth Daily., Disp: , Rfl:   •  aspirin 81 MG EC tablet, Take 81 mg by mouth Daily., Disp: , Rfl:   •  atorvastatin (LIPITOR) 20 MG tablet, Take 1 tablet by mouth Every Night., Disp: 90 tablet, Rfl: 3  •  carvedilol (COREG) 25 MG tablet, Take 1 tablet by mouth 2 (Two) Times a Day With Meals., Disp: 180 tablet, Rfl: 3  •  clopidogrel (PLAVIX) 75 MG tablet, Take 1 tablet by mouth Daily., Disp: 90 tablet, Rfl: 3  •  furosemide (LASIX) 40 MG tablet, Take 1 tablet by mouth Daily., Disp: 90 tablet, Rfl: 3  •  HYDROcodone-acetaminophen (NORCO) 7.5-325 MG per tablet, take 1 tablet by mouth three times a day if needed for pain -FILL DATE 11-, Disp: , Rfl: 0  •  lisinopril (PRINIVIL,ZESTRIL) 10 MG tablet, Take 1 tablet by mouth Daily., Disp: 90 tablet, Rfl: 3    Review of Systems   Constitutional: Negative.    HENT: Negative.    Eyes: Negative.    Respiratory: Negative.    Cardiovascular: Positive for leg swelling.        Leg cramping   Gastrointestinal: Negative.    Endocrine: Negative.    Genitourinary: Negative.    Musculoskeletal: Negative.    Skin: Negative.    Allergic/Immunologic: Negative.    Neurological: Negative.   "  Hematological: Negative.    Psychiatric/Behavioral: Negative.    All other systems reviewed and are negative.      /77 (BP Location: Right arm, Patient Position: Sitting, Cuff Size: Adult)   Pulse 82   Ht 191.8 cm (75.5\")   Wt 104 kg (230 lb)   SpO2 99%   BMI 28.37 kg/m²   Physical Exam   Constitutional: He is oriented to person, place, and time. He appears well-developed and well-nourished.   HENT:   Head: Normocephalic and atraumatic.   Neck: Neck supple. No JVD present. Carotid bruit is not present. No thyromegaly present.   Cardiovascular: Normal rate, regular rhythm and normal heart sounds.    Pulses:       Carotid pulses are 2+ on the right side, and 2+ on the left side.       Femoral pulses are 2+ on the right side, and 2+ on the left side.       Popliteal pulses are 2+ on the right side, and 2+ on the left side.        Dorsalis pedis pulses are 2+ on the right side, and 2+ on the left side.        Posterior tibial pulses are 2+ on the right side, and 2+ on the left side.   Pulmonary/Chest: Effort normal and breath sounds normal.   Abdominal: Soft. Bowel sounds are normal. He exhibits no distension, no abdominal bruit and no mass. There is no hepatosplenomegaly. There is no tenderness.   Musculoskeletal: Normal range of motion. He exhibits no edema.   Neurological: He is alert and oriented to person, place, and time. He has normal strength. No cranial nerve deficit or sensory deficit.   Skin: Skin is warm and intact.   Nursing note and vitals reviewed.      DIAGNOSTIC DATA: Venous valvular insufficiency study shows venous insufficiency to right lower extremity.    Patient Active Problem List   Diagnosis   • PVD (peripheral vascular disease)   • CAD (coronary artery disease)   • S/P implantation of automatic cardioverter/defibrillator (AICD)   • HLD (hyperlipidemia)   • Ischemic heart disease   • HTN (hypertension)   • CHF (congestive heart failure)   • CKD (chronic kidney disease)   • ICAO " (internal carotid artery occlusion), right         ICD-10-CM ICD-9-CM   1. Venous insufficiency I87.2 459.81   2. Bilateral carotid artery stenosis I65.23 433.10     433.30   3. Mixed hyperlipidemia E78.2 272.2   4. Essential hypertension I10 401.9   5. Preop testing Z01.818 V72.84   6. Encounter for monitoring antiplatelet therapy Z51.81 V58.83    Z79.02 V58.63       PLAN: After thoroughly evaluating Jadon Flynn, I believe the best course of action is to proceed with a right lower extremity radiofrequency ablation of the greater saphenous vein.  He does have significant venous reflux noted.  Risks of radiofrequency ablation include, but are not limited to, bleeding, infection, vessel damage, nerve damage, DVT, phlebitis, and pulmonary embolus.  The patient understands these risks and wishes to proceed with procedure.  He can continue to wear compression stockings in the 20-30 mm pressure gradient range.  I did instruct the patient on how to wear these on a daily basis.  Currently, he appears to be doing quite well and remains a symptomatically from a strokelike standpoint.  His noninvasive testing is unchanged.  We will follow with carotid disease in 1 year.   I did discuss vascular risk factors as they pertain to the progression of vascular disease including controlling his hypertension and hyperlipidemia.  Body mass index is 28.37 kg/m². Follow up with PCP regarding plan including diet and exercise.  The patient is to continue taking their medications as previously discussed.   This was all discussed in full with complete understanding.  Thank you for allowing me to participate in the care of your patient.  Please do not hesitate to call with any questions or concerns.  We will keep you aware of any further encounters with Jadon Flynn.      Sincerely Yours,        OLI Barnett

## 2018-07-11 NOTE — PROGRESS NOTES
Patient called with complaints of numbness when lying down to his leg status post RFA this morning.  His foot is nice and warm.  I explained to unwrap dressing as this may be too tight.  He will have his wife rewrap but slightly looser.  I instructed to call with any worsening symptoms.  He voices understanding.

## 2018-07-17 ENCOUNTER — TELEPHONE (OUTPATIENT)
Dept: VASCULAR SURGERY | Facility: CLINIC | Age: 68
End: 2018-07-17

## 2018-07-17 NOTE — TELEPHONE ENCOUNTER
Left message reminding Mr Flynn of his appointments for tomorrow. Reminded Mr Flynn to arrive at the [facility] at 630 am for testing and follow up afterwards in the office with Shwetha BAIRD at 945 am. Also advised if he had any questions or needed to reschedule to please call the office at [phone].

## 2018-07-18 ENCOUNTER — OFFICE VISIT (OUTPATIENT)
Dept: VASCULAR SURGERY | Facility: CLINIC | Age: 68
End: 2018-07-18

## 2018-07-18 ENCOUNTER — HOSPITAL ENCOUNTER (OUTPATIENT)
Dept: ULTRASOUND IMAGING | Facility: HOSPITAL | Age: 68
Discharge: HOME | End: 2018-07-18
Attending: SURGERY | Admitting: SURGERY

## 2018-07-18 VITALS
HEIGHT: 76 IN | WEIGHT: 230 LBS | OXYGEN SATURATION: 98 % | HEART RATE: 76 BPM | SYSTOLIC BLOOD PRESSURE: 124 MMHG | DIASTOLIC BLOOD PRESSURE: 68 MMHG | BODY MASS INDEX: 28.01 KG/M2

## 2018-07-18 DIAGNOSIS — I87.2 VENOUS INSUFFICIENCY: ICD-10-CM

## 2018-07-18 DIAGNOSIS — I87.391 CHRONIC VENOUS HYPERTENSION (IDIOPATHIC) WITH OTHER COMPLICATIONS OF RIGHT LOWER EXTREMITY: ICD-10-CM

## 2018-07-18 DIAGNOSIS — I87.2 VENOUS INSUFFICIENCY: Primary | ICD-10-CM

## 2018-07-18 DIAGNOSIS — E78.2 MIXED HYPERLIPIDEMIA: ICD-10-CM

## 2018-07-18 DIAGNOSIS — I65.23 BILATERAL CAROTID ARTERY STENOSIS: ICD-10-CM

## 2018-07-18 DIAGNOSIS — I10 ESSENTIAL HYPERTENSION: ICD-10-CM

## 2018-07-18 PROBLEM — Z01.818 PREOP TESTING: Status: RESOLVED | Noted: 2018-06-27 | Resolved: 2018-07-18

## 2018-07-18 PROCEDURE — 93971 EXTREMITY STUDY: CPT | Performed by: SURGERY

## 2018-07-18 PROCEDURE — 99213 OFFICE O/P EST LOW 20 MIN: CPT | Performed by: NURSE PRACTITIONER

## 2018-07-18 PROCEDURE — 93971 EXTREMITY STUDY: CPT

## 2018-07-18 NOTE — PROGRESS NOTES
"07/18/2018       Alli Perry MD  [address] [facility] [address]        Jadon Flynn  1950    Chief Complaint   Patient presents with   • Follow-up     1 Week Post-Op Follow Up RIGHT SAPHENOUS VEIN RADIO FREQUENCY ABLATION. Test 07/18/18 US pad venous lower ext right. Patient denies any stroke like symptoms.        Dear Alli Perry MD:    HPI     I had the pleasure of seeing you patient in the office today for follow up.  As you recall, the patient is a 67 y.o. male who we are currently following for carotid occlusive disease.  He is status post left carotid endarterectomy and has a known right carotid occlusion.  Currently, he appears to be doing quite well and has no specific complaints.  He remains asymptomatic from a strokelike standpoint. He has complaints of muscle twitching and cramping to his legs at night.  He has been wearing compression stockings, but did not feel any benefit.  He did undergo right lower extremity radiofrequency ablation of the greater saphenous vein.  He states the twitching and pain is relieved.  He had noninvasive testing performed today in which I personally reviewed.      /68 (BP Location: Left arm, Patient Position: Sitting, Cuff Size: Adult)   Pulse 76   Ht 191.8 cm (75.5\")   Wt 104 kg (230 lb)   SpO2 98%   BMI 28.37 kg/m²   Physical Exam   Constitutional: He is oriented to person, place, and time. He appears well-developed and well-nourished.   HENT:   Head: Normocephalic and atraumatic.   Neck: Neck supple. No JVD present. Carotid bruit is not present. No thyromegaly present.   Cardiovascular: Normal rate, regular rhythm and normal heart sounds.    Pulses:       Carotid pulses are 2+ on the right side, and 2+ on the left side.       Femoral pulses are 2+ on the right side, and 2+ on the left side.       Popliteal pulses are 2+ on the right side, and 2+ on the left side.        Dorsalis pedis pulses are 2+ on the right side, and 2+ on the left " side.        Posterior tibial pulses are 2+ on the right side, and 2+ on the left side.   Pulmonary/Chest: Effort normal and breath sounds normal.   Abdominal: Soft. Bowel sounds are normal. He exhibits no distension, no abdominal bruit and no mass. There is no hepatosplenomegaly. There is no tenderness.   Musculoskeletal: Normal range of motion. He exhibits no edema.   Neurological: He is alert and oriented to person, place, and time. He has normal strength. No cranial nerve deficit or sensory deficit.   Skin: Skin is warm and intact.   Nursing note and vitals reviewed.      DIAGNOSTIC DATA: Venous duplex closed as expected post ablation. No DVT seen.    Patient Active Problem List   Diagnosis   • PVD (peripheral vascular disease) (CMS/[facility])   • CAD (coronary artery disease)   • S/P implantation of automatic cardioverter/defibrillator (AICD)   • HLD (hyperlipidemia)   • Ischemic heart disease   • HTN (hypertension)   • CHF (congestive heart failure) (CMS/[facility])   • CKD (chronic kidney disease)   • ICAO (internal carotid artery occlusion), right   • Venous insufficiency         ICD-10-CM ICD-9-CM   1. Venous insufficiency I87.2 459.81   2. Bilateral carotid artery stenosis I65.23 433.10     433.30   3. Essential hypertension I10 401.9   4. Mixed hyperlipidemia E78.2 272.2       PLAN: After thoroughly evaluating Jadon ARANGO Kyritesh, I am pleased to report he is doing well status post right lower extremity radiofrequency ablation of the greater saphenous vein.   He can continue to wear compression stockings in the 20-30 mm pressure gradient range.  I did instruct the patient on how to wear these on a daily basis.  Currently, he appears to be doing quite well and remains a symptomatically from a strokelike standpoint.  His noninvasive testing is unchanged.  We will follow with carotid disease at his next appointment as will have almost been 1 year.   I did discuss vascular risk factors as they pertain to the progression  of vascular disease including controlling his hypertension and hyperlipidemia.  Body mass index is 28.37 kg/m². Follow up with PCP regarding plan including diet and exercise.  The patient is to continue taking their medications as previously discussed.   This was all discussed in full with complete understanding.  Thank you for allowing me to participate in the care of your patient.  Please do not hesitate to call with any questions or concerns.  We will keep you aware of any further encounters with Jadon Flynn.      Sincerely Yours,        OLI Barnett

## 2018-07-23 DIAGNOSIS — I87.2 VENOUS (PERIPHERAL) INSUFFICIENCY: Primary | ICD-10-CM

## 2018-07-23 DIAGNOSIS — I73.9 PVD (PERIPHERAL VASCULAR DISEASE) (HCC): ICD-10-CM

## 2018-07-30 RX ORDER — ATORVASTATIN CALCIUM 20 MG/1
TABLET, FILM COATED ORAL
Qty: 90 TABLET | Refills: 3 | Status: ON HOLD | OUTPATIENT
Start: 2018-07-30 | End: 2018-12-30 | Stop reason: DRUGHIGH

## 2018-07-30 RX ORDER — FUROSEMIDE 40 MG/1
40 TABLET ORAL DAILY
Qty: 90 TABLET | Refills: 3 | Status: SHIPPED | OUTPATIENT
Start: 2018-07-30 | End: 2019-08-01 | Stop reason: SDUPTHER

## 2018-07-30 RX ORDER — LISINOPRIL 10 MG/1
10 TABLET ORAL DAILY
Qty: 90 TABLET | Refills: 3 | Status: SHIPPED | OUTPATIENT
Start: 2018-07-30 | End: 2019-07-10 | Stop reason: SDUPTHER

## 2018-07-30 NOTE — TELEPHONE ENCOUNTER
Refill request for:  Atorvastatin 20 mg PO nightly, Quantity: 90, Refills: 3.  Furosemide 40 mg PO daily, Quantity: 90, Refills: 3.  Lisinopril 10 mg PO daily, Quantity: 90, Refills: 3.  HL

## 2018-09-12 ENCOUNTER — CLINICAL SUPPORT (OUTPATIENT)
Dept: CARDIOLOGY | Facility: CLINIC | Age: 68
End: 2018-09-12

## 2018-09-12 DIAGNOSIS — I50.22 CHRONIC SYSTOLIC CONGESTIVE HEART FAILURE (HCC): ICD-10-CM

## 2018-09-12 PROCEDURE — 93295 DEV INTERROG REMOTE 1/2/MLT: CPT | Performed by: PHYSICIAN ASSISTANT

## 2018-09-12 PROCEDURE — 93296 REM INTERROG EVL PM/IDS: CPT | Performed by: PHYSICIAN ASSISTANT

## 2018-09-25 ENCOUNTER — CLINICAL SUPPORT (OUTPATIENT)
Dept: CARDIOLOGY | Facility: CLINIC | Age: 68
End: 2018-09-25

## 2018-09-25 DIAGNOSIS — I50.22 CHRONIC SYSTOLIC CONGESTIVE HEART FAILURE (HCC): ICD-10-CM

## 2018-09-25 DIAGNOSIS — Z95.810 S/P IMPLANTATION OF AUTOMATIC CARDIOVERTER/DEFIBRILLATOR (AICD): Primary | ICD-10-CM

## 2018-09-25 NOTE — PROGRESS NOTES
Single Chamber AICD Evaluation Report  REMOTE/LATITUDE    September 25, 2018    Primary Cardiologist: Mike  : Guidant Model: TELIGEN 100 E102  Implant date: 11/09/2011    Reason for evaluation: remote interrogation with VT in VF rate zone  Indication for AICD: chronic systolic congestive heart failure    Measurements  Ventricular sensing - R wave: 3.5 mV  Ventricular threshold: N/R  Ventricular lead impedance: 369 ohms  Shock Impedance: RV 46 ohms      Diagnostic Data  Paced: 2 %    Episodes recorded since 9/12/2018:  Since 9/19, several NS-VT episodes.  Rates 182-197 bpm.  Longest duration 7 seconds.  3 VT episodes in VF rate zone.  1 episode treated successfully with one sequence of ATP.  Longest duration 18 seconds. Rates 223-225 bpm.  No shocks.  Patient reports being asymptomatic and compliant with medications.       Battery status: satisfactory, estimated 6.5 years remaining     Final Parameters  Mode: VVI  Lower rate: 50 bpm   Ventricular - Amplitude: 3.4 V   Pulse width: 0.4 ms   Sensitivity: 0.6 mV   Changes made: N/A--remote transmission  Conclusions: normal AICD function, adequate battery reserve and ATP to convert VT    Follow up: Every 3 months

## 2018-09-26 DIAGNOSIS — I47.20 V TACH (HCC): Primary | ICD-10-CM

## 2018-09-26 RX ORDER — AMIODARONE HYDROCHLORIDE 200 MG/1
200 TABLET ORAL 3 TIMES DAILY
Qty: 21 TABLET | Refills: 0 | Status: SHIPPED | OUTPATIENT
Start: 2018-09-26 | End: 2018-10-03

## 2018-09-26 RX ORDER — AMIODARONE HYDROCHLORIDE 200 MG/1
200 TABLET ORAL 2 TIMES DAILY
Qty: 60 TABLET | Refills: 3 | Status: SHIPPED | OUTPATIENT
Start: 2018-10-04 | End: 2019-03-07

## 2018-09-27 RX ORDER — CLOPIDOGREL BISULFATE 75 MG/1
TABLET ORAL
Qty: 90 TABLET | Refills: 2 | Status: SHIPPED | OUTPATIENT
Start: 2018-09-27 | End: 2018-12-30 | Stop reason: HOSPADM

## 2018-09-28 ENCOUNTER — DOCUMENTATION (OUTPATIENT)
Dept: CARDIOLOGY | Facility: CLINIC | Age: 68
End: 2018-09-28

## 2018-09-28 NOTE — PROGRESS NOTES
Single Chamber AICD Evaluation Report  REMOTE/LATITUDE    September 28, 2018    Primary Cardiologist: Mike  : Guidant Model: TELIGEN 100 E102  Implant date: 11/9/2011    Reason for evaluation: remote transmission with ATP to convert VT  Indication for AICD: chronic systolic congestive heart failure    Measurements  Ventricular sensing - R wave: 4.9 mV  Ventricular threshold: N/R  Ventricular lead impedance: 386 ohms  Shock Impedance: RV 56 ohms      Diagnostic Data  Paced: 2 %    Episodes recorded since 9/25/2018  8 NS-VT episodes on 9/25-9/26:  Longest duration 15 seconds, rates 182-215 bpm.  1 VT episode at 233 bpm (in VF rate zone) successfully treated with 1 sequence of ATP; episode duration 17 seconds  No shocks.    Note:  Patient started on amiodarone therapy on 9/26/2018.  Will recheck on 10/30.    Battery status: satisfactory, estimated 6.5 years remaining     Final Parameters  Mode: VVI  Lower rate: 50 bpm   Ventricular - Amplitude: 3.4 V   Pulse width: 0.4 ms   Sensitivity: 0.6 mV   Changes made: N/A-remote transmission  Conclusions: normal AICD function, stable pacing and sensing thresholds, adequate battery reserve and ATP to convert VT    Follow up: Recheck 10/30, 1 month after starting amiodarone

## 2018-10-02 NOTE — PROGRESS NOTES
Single Chamber AICD Evaluation Report  Latitude    October 2, 2018    Primary Cardiologist: Mike  : Guidant Model: Teligen  Implant date: 11/9/11    Reason for evaluation: routine  Indication for AICD: chronic systolic heart failure    Measurements  Ventricular sensing - R wave: 4.9 mV  Ventricular threshold: n/r  Ventricular lead impedance: 386 ohms  Shock Impedance: RV 56 ohms        Diagnostic Data  Paced: 2 %  Other: VT for which successful ATP therapy was delivered.  Battery status: satisfactory     Final Parameters  Mode: VVI  Lower rate: 50 bpm   Ventricular - Amplitude: 3.4 V   Pulse width: 0.4 ms   Sensitivity: 0.6 mV   Changes made: n/a  Conclusions: normal AICD function    Follow up: 3 months

## 2018-10-10 ENCOUNTER — HOSPITAL ENCOUNTER (OUTPATIENT)
Dept: PULMONOLOGY | Facility: HOSPITAL | Age: 68
Discharge: HOME | End: 2018-10-10
Admitting: NURSE PRACTITIONER

## 2018-10-10 ENCOUNTER — LAB (OUTPATIENT)
Dept: LAB | Facility: HOSPITAL | Age: 68
End: 2018-10-10

## 2018-10-10 ENCOUNTER — HOSPITAL ENCOUNTER (OUTPATIENT)
Dept: PULMONOLOGY | Facility: HOSPITAL | Age: 68
Discharge: HOME | End: 2018-10-10

## 2018-10-10 DIAGNOSIS — I47.20 V TACH (HCC): ICD-10-CM

## 2018-10-10 LAB
ALBUMIN SERPL-MCNC: 4.7 G/DL (ref 3.5–5)
ALBUMIN/GLOB SERPL: 1.7 G/DL (ref 1.1–2.5)
ALP SERPL-CCNC: 53 U/L (ref 24–120)
ALT SERPL W P-5'-P-CCNC: 34 U/L (ref 0–54)
ANION GAP SERPL CALCULATED.3IONS-SCNC: 12 MMOL/L (ref 4–13)
AST SERPL-CCNC: 58 U/L (ref 7–45)
BILIRUB SERPL-MCNC: 0.7 MG/DL (ref 0.1–1)
BUN BLD-MCNC: 43 MG/DL (ref 5–21)
BUN/CREAT SERPL: 18.7 (ref 7–25)
CALCIUM SPEC-SCNC: 9.5 MG/DL (ref 8.4–10.4)
CHLORIDE SERPL-SCNC: 103 MMOL/L (ref 98–110)
CO2 SERPL-SCNC: 27 MMOL/L (ref 24–31)
CREAT BLD-MCNC: 2.3 MG/DL (ref 0.5–1.4)
GFR SERPL CREATININE-BSD FRML MDRD: 29 ML/MIN/1.73
GLOBULIN UR ELPH-MCNC: 2.8 GM/DL
GLUCOSE BLD-MCNC: 115 MG/DL (ref 70–100)
POTASSIUM BLD-SCNC: 4.8 MMOL/L (ref 3.5–5.3)
PROT SERPL-MCNC: 7.5 G/DL (ref 6.3–8.7)
SODIUM BLD-SCNC: 142 MMOL/L (ref 135–145)
TSH SERPL DL<=0.05 MIU/L-ACNC: 4.2 MIU/ML (ref 0.47–4.68)

## 2018-10-10 PROCEDURE — 94729 DIFFUSING CAPACITY: CPT

## 2018-10-10 PROCEDURE — 84443 ASSAY THYROID STIM HORMONE: CPT | Performed by: NURSE PRACTITIONER

## 2018-10-10 PROCEDURE — 36415 COLL VENOUS BLD VENIPUNCTURE: CPT

## 2018-10-10 PROCEDURE — 94010 BREATHING CAPACITY TEST: CPT

## 2018-10-10 PROCEDURE — 94727 GAS DIL/WSHOT DETER LNG VOL: CPT

## 2018-10-10 PROCEDURE — 80053 COMPREHEN METABOLIC PANEL: CPT | Performed by: NURSE PRACTITIONER

## 2018-10-11 DIAGNOSIS — R94.2 ABNORMAL PFT: Primary | ICD-10-CM

## 2018-10-30 ENCOUNTER — OFFICE VISIT (OUTPATIENT)
Dept: CARDIOLOGY | Facility: CLINIC | Age: 68
End: 2018-10-30

## 2018-10-30 ENCOUNTER — CLINICAL SUPPORT NO REQUIREMENTS (OUTPATIENT)
Dept: CARDIOLOGY | Facility: CLINIC | Age: 68
End: 2018-10-30

## 2018-10-30 VITALS
HEIGHT: 75 IN | SYSTOLIC BLOOD PRESSURE: 112 MMHG | HEART RATE: 72 BPM | WEIGHT: 228 LBS | BODY MASS INDEX: 28.35 KG/M2 | DIASTOLIC BLOOD PRESSURE: 62 MMHG

## 2018-10-30 DIAGNOSIS — I50.22 CHRONIC SYSTOLIC CONGESTIVE HEART FAILURE (HCC): ICD-10-CM

## 2018-10-30 DIAGNOSIS — I10 ESSENTIAL HYPERTENSION: ICD-10-CM

## 2018-10-30 DIAGNOSIS — I25.10 CORONARY ARTERY DISEASE INVOLVING NATIVE CORONARY ARTERY OF NATIVE HEART WITHOUT ANGINA PECTORIS: ICD-10-CM

## 2018-10-30 DIAGNOSIS — I47.20 VENTRICULAR TACHYCARDIA (HCC): Primary | ICD-10-CM

## 2018-10-30 DIAGNOSIS — Z95.810 AICD (AUTOMATIC CARDIOVERTER/DEFIBRILLATOR) PRESENT: Primary | ICD-10-CM

## 2018-10-30 DIAGNOSIS — I73.9 PVD (PERIPHERAL VASCULAR DISEASE) (HCC): ICD-10-CM

## 2018-10-30 DIAGNOSIS — N18.9 CHRONIC KIDNEY DISEASE, UNSPECIFIED CKD STAGE: ICD-10-CM

## 2018-10-30 DIAGNOSIS — Z95.810 S/P IMPLANTATION OF AUTOMATIC CARDIOVERTER/DEFIBRILLATOR (AICD): ICD-10-CM

## 2018-10-30 DIAGNOSIS — R94.2 ABNORMAL PFT: ICD-10-CM

## 2018-10-30 DIAGNOSIS — E78.2 MIXED HYPERLIPIDEMIA: ICD-10-CM

## 2018-10-30 PROCEDURE — 93000 ELECTROCARDIOGRAM COMPLETE: CPT | Performed by: NURSE PRACTITIONER

## 2018-10-30 PROCEDURE — 99214 OFFICE O/P EST MOD 30 MIN: CPT | Performed by: NURSE PRACTITIONER

## 2018-10-30 PROCEDURE — 93282 PRGRMG EVAL IMPLANTABLE DFB: CPT | Performed by: INTERNAL MEDICINE

## 2018-10-30 NOTE — PATIENT INSTRUCTIONS

## 2018-10-30 NOTE — PROGRESS NOTES
"    Subjective:     Encounter Date:10/30/2018      Patient ID: Jadon Flynn is a 68 y.o. male with a history of coronary artery disease, ischemic cardiomyopathy, systolic heart failure, PVD, HTN, HLD, CKD, and recently found to have VT on his device interrogation. He was started on Amiodarone and presents today for follow up since beginning medical therapy.     Chief Complaint: VT follow up  History of Present Illness  Patient was originally found to have VT on device interrogation which was successfully terminated with ATP, no shocks.  He was asymptomatic.  He notes no changes.  He denies palpitations, dizziness, lightheadedness, or near syncope. He notes that if he exerts himself he has fatigue, but this is not a new finding. He denies orthopnea, PND, or edema. When asked about shortness of breath he relates that regularly he has no problem, only if he is upset does he feel short of breath.  He has no appreciable volume overload on exam.      He recently followed up with his kidney doctor's office. He relates \"he just told me I needed to drink more water.\" He had an elevated creatinine on recent labs.    He had abnormal PFTs and has appt with pulmonology soon.  PFTs were checked prior to the start of amiodarone therapy.     The following portions of the patient's history were reviewed and updated as appropriate: allergies, current medications, past family history, past medical history, past social history and past surgical history.     No Known Allergies      Current Outpatient Prescriptions:   •  allopurinol (ZYLOPRIM) 100 MG tablet, Take 100 mg by mouth Daily., Disp: , Rfl:   •  amiodarone (PACERONE) 200 MG tablet, Take 1 tablet by mouth 2 (Two) Times a Day., Disp: 60 tablet, Rfl: 3  •  aspirin 81 MG EC tablet, Take 81 mg by mouth Daily., Disp: , Rfl:   •  atorvastatin (LIPITOR) 20 MG tablet, TAKE 1 TABLET EVERY NIGHT, Disp: 90 tablet, Rfl: 3  •  carvedilol (COREG) 25 MG tablet, TAKE 1 TABLET TWICE A DAY " WITH MEALS, Disp: 180 tablet, Rfl: 3  •  clopidogrel (PLAVIX) 75 MG tablet, TAKE 1 TABLET DAILY, Disp: 90 tablet, Rfl: 2  •  furosemide (LASIX) 40 MG tablet, Take 1 tablet by mouth Daily., Disp: 90 tablet, Rfl: 3  •  HYDROcodone-acetaminophen (NORCO) 7.5-325 MG per tablet, take 1 tablet by mouth three times a day if needed for pain -FILL DATE 11-, Disp: , Rfl: 0  •  lisinopril (PRINIVIL,ZESTRIL) 10 MG tablet, Take 1 tablet by mouth Daily., Disp: 90 tablet, Rfl: 3    Past Medical History:   Diagnosis Date   • Asymptomatic bilateral carotid artery stenosis    • CAD (coronary artery disease) 12/07/2016   • CAD in native artery      2009 stents   • Carotid artery disease (CMS/[facility])    • CHF (congestive heart failure) (CMS/[facility]) 12/07/2016   • Chronic combined systolic and diastolic CHF (congestive heart failure) (CMS/[facility])     echo 5/2013 EF 35-40%   • Chronic systolic CHF (congestive heart failure), NYHA class 2 (CMS/[facility])    • CKD (chronic kidney disease) 12/07/2016   • CKD (chronic kidney disease), stage III (CMS/[facility])    • Essential hypertension    • Gout    • HLD (hyperlipidemia) 12/07/2016   • HTN (hypertension) 12/07/2016   • Hyperkalemia    • Hyperlipidemia, unspecified    • Ischemic heart disease 12/07/2016   • Ischemic heart disease    • Peripheral vascular disease (CMS/[facility])    • Pinched nerve in neck    • PVD (peripheral vascular disease) (CMS/[facility]) 12/07/2016   • S/P implantation of automatic cardioverter/defibrillator (AICD) 12/07/2016   • S/P implantation of automatic cardioverter/defibrillator (AICD)    • Stroke (CMS/[facility])      Family History   Problem Relation Age of Onset   • Cancer Father    • Heart disease Mother    • Diabetes Mother    • Sleep apnea Mother    • Hypertension Mother    • Coronary artery disease Other      Social History     Social History   • Marital status:      Spouse name: N/A   • Number of children: N/A   • Years of education: N/A     Occupational History   • Not on file.      Social History Main Topics   • Smoking status: Former Smoker     Quit date: 2010   • Smokeless tobacco: Never Used   • Alcohol use No   • Drug use: No   • Sexual activity: Defer     Other Topics Concern   • Not on file     Social History Narrative   • No narrative on file     Past Surgical History:   Procedure Laterality Date   • CARDIAC DEFIBRILLATOR PLACEMENT     • CAROTID ENDARTERECTOMY     • CARPAL TUNNEL RELEASE Right    • CORONARY STENT PLACEMENT      x2   • FINGER SURGERY Right     tendon repair    • HYDROCELE EXCISION / REPAIR     • INSERT / REPLACE / REMOVE PACEMAKER  2011   • TOTAL KNEE ARTHROPLASTY Left    • VARICOSE VEIN SURGERY Right 7/11/2018    Procedure: RIGHT SAPHENOUS VEIN RADIO FREQUENCY ABLATION;  Surgeon: Dave Chavarria DO;  Location: [facility] OR 12;  Service: Vascular     Review of Systems   Constitution: Positive for malaise/fatigue (with great exertion). Negative for chills, diaphoresis, fever, weight gain and weight loss.   HENT: Positive for congestion (at night).    Eyes: Negative for visual disturbance.   Cardiovascular: Negative for chest pain, dyspnea on exertion, irregular heartbeat, leg swelling, near-syncope, orthopnea, palpitations and paroxysmal nocturnal dyspnea.   Respiratory: Negative for cough and wheezing.    Hematologic/Lymphatic: Negative for bleeding problem.   Musculoskeletal: Negative for falls.   Gastrointestinal: Negative for bloating, abdominal pain, nausea and vomiting.   Neurological: Negative for dizziness, focal weakness, light-headedness and loss of balance.   Psychiatric/Behavioral: Negative for altered mental status and substance abuse.         ECG 12 Lead  Date/Time: 10/30/2018 9:16 AM  Performed by: ZEYAD CENTENO  Authorized by: ZEYAD CENTENO   Comparison: compared with previous ECG from 6/29/2018  Rhythm: sinus rhythm  Ectopy: infrequent PVCs  Rate: normal  BPM: 72  Conduction: left bundle branch block and 1st degree  ST Segments: ST segments  normal  Clinical impression: abnormal ECG          Vitals:    10/30/18 0846   BP: 112/62   Pulse: 72     1    10/30/18  0846   Weight: 103 kg (228 lb)          Objective:     Physical Exam   Constitutional: He is oriented to person, place, and time. He appears well-developed and well-nourished. No distress.   HENT:   Head: Normocephalic and atraumatic.   Nose: Nose normal.   Mouth/Throat: Oropharynx is clear and moist. No oropharyngeal exudate.   Eyes: Conjunctivae are normal. No scleral icterus.   Neck: Normal range of motion. Neck supple.   Cardiovascular: Normal rate, regular rhythm and normal heart sounds.  Exam reveals no gallop and no friction rub.    No murmur heard.  Pulmonary/Chest: Effort normal. No respiratory distress. He has no wheezes. He has rales (LLL).   Abdominal: Soft. Normal appearance. He exhibits no distension. There is no tenderness.   Musculoskeletal: Normal range of motion. He exhibits no edema or deformity.   Neurological: He is alert and oriented to person, place, and time.   Skin: Skin is warm, dry and intact. No rash noted. He is not diaphoretic. No erythema. No pallor.   Psychiatric: He has a normal mood and affect. His behavior is normal.   Vitals reviewed.      Lab Review: recent interrogation      Assessment:          Diagnosis Plan   1. Ventricular tachycardia (CMS/HCC)     2. Coronary artery disease involving native coronary artery of native heart without angina pectoris     3. Chronic systolic congestive heart failure (CMS/HCC)     4. S/P implantation of automatic cardioverter/defibrillator (AICD)     5. Chronic kidney disease, unspecified CKD stage     6. PVD (peripheral vascular disease) (CMS/HCC)     7. Essential hypertension     8. Mixed hyperlipidemia     9. Abnormal PFT            Plan:           - VT: noted on interrogation in September. Patient was treated with ATP successfully. He has had no shocks.  He was started on Amiodarone. He is on 200 mg BID. He will have follow  up with device check today. He denies any shocks or symptoms since beginning amiodarone.     - CAD: no chest pain, pressure, or similar. No complaints of unusual shortness of breath. On aspirin 81 mg daily. No changes. Stable    - CHF: no symptoms of heart failure. Does have some faint crackles in the left base. On lasix daily. Monitor volume status at home as he was recently told to increase his fluid intake. Discussed need for strict balance with CHF and CKD.    - AICD: in situ. No shocks.    - CKD: following with nephrology.     - PVD: sees Dr. Chavarria    - HTN: well controlled with beta blocker, ace inhibitor.    - HLD: on statin.    - PFT: checked prior to start of amiodarone. Has appt with pulm scheduled. Former smoker.     RTC: Follow up 3 months. Will discuss any needed changes for medical therapy with Dr. Mckeon and call patient with any changes. Discussed interrogation today with Ekaterina.

## 2018-11-20 PROBLEM — J44.9 COPD, GROUP B, BY GOLD 2017 CLASSIFICATION (HCC): Status: ACTIVE | Noted: 2018-11-20

## 2018-11-20 NOTE — PROGRESS NOTES
" OLI Dempsey  [facility]   Res[facility] Disease Clinic  [address] [place] [address]  Phone: [phone]  Fax: [phone]     Jadon Flynn is a 68 y.o. male.   CC:   Chief Complaint   Patient presents with   • COPD        HPI:Mr. Moctezuma's coming in as a new referral for \"abnormal PFTs\".  He is noted to be on amiodarone therapy for issues with his heart.  This was recently started for him at 200 milligrams twice a day.  He is noted to be former smoker. He also suffers from chronic kidney disease and peripheral vascular disease.  Records from the cardiologist's office indicate he said issues with ventricular tachycardia.  He comes in today and says he apparently per the reports had some ventricular tachycardia which activated his defibrillator.  He says they informed and this occurred twice however he does not recall his defibrillator going off.  As a result he was started on amiodarone therapy 3 times a day.  The PFTs were obtained prior to starting the therapy.  He denies having any major issues with shortness of breath on a day-to-day basis.  He however did report increasing in shortness of breath after starting the medication.  That since reduced him to twice a day dosing due to this.  He indicates his symptoms were persistent so he has reduced himself to one pill a day since his last visit at the cardiology office.    The following portions of the patient's history were reviewed and updated as appropriate: allergies, current medications, past family history, past medical history, past social history, past surgical history and problem list.    Past Medical History:   Diagnosis Date   • Asymptomatic bilateral carotid artery stenosis    • CAD (coronary artery disease) 12/07/2016   • CAD in native artery      2009 stents   • Carotid artery disease (CMS/[facility])    • CHF (congestive heart failure) (CMS/[facility]) 12/07/2016   • Chronic combined systolic and diastolic CHF " (congestive heart failure) (CMS/HCC)     echo 2013 EF 35-40%   • Chronic systolic CHF (congestive heart failure), NYHA class 2 (CMS/HCC)    • CKD (chronic kidney disease) 2016   • CKD (chronic kidney disease), stage III (CMS/HCC)    • COPD, group B, by GOLD 2017 classification (CMS/HCC) 2018   • Essential hypertension    • Gout    • HLD (hyperlipidemia) 2016   • HTN (hypertension) 2016   • Hyperkalemia    • Hyperlipidemia, unspecified    • Ischemic heart disease 2016   • Ischemic heart disease    • Peripheral vascular disease (CMS/HCC)    • Pinched nerve in neck    • PVD (peripheral vascular disease) (CMS/HCC) 2016   • S/P implantation of automatic cardioverter/defibrillator (AICD) 2016   • S/P implantation of automatic cardioverter/defibrillator (AICD)    • Stroke (CMS/HCC)        Family History   Problem Relation Age of Onset   • Cancer Father    • Heart disease Mother    • Diabetes Mother    • Sleep apnea Mother    • Hypertension Mother    • Coronary artery disease Other        Social History     Socioeconomic History   • Marital status:      Spouse name: Not on file   • Number of children: Not on file   • Years of education: Not on file   • Highest education level: Not on file   Social Needs   • Financial resource strain: Not on file   • Food insecurity - worry: Not on file   • Food insecurity - inability: Not on file   • Transportation needs - medical: Not on file   • Transportation needs - non-medical: Not on file   Occupational History   • Not on file   Tobacco Use   • Smoking status: Former Smoker     Last attempt to quit: 2010     Years since quittin.8   • Smokeless tobacco: Never Used   Substance and Sexual Activity   • Alcohol use: No   • Drug use: No   • Sexual activity: Defer   Other Topics Concern   • Not on file   Social History Narrative   • Not on file       Review of Systems   Constitutional: Positive for fatigue. Negative for activity change,  chills and fever.   HENT: Negative for congestion, postnasal drip, rhinorrhea, sinus pressure, sore throat and trouble swallowing.    Eyes: Negative for blurred vision, double vision and pain.   Respiratory: Positive for cough and shortness of breath. Negative for chest tightness and wheezing.    Cardiovascular: Positive for leg swelling. Negative for chest pain and palpitations.        Orthopnea   Gastrointestinal: Negative for abdominal distention, constipation, diarrhea, nausea and vomiting.   Endocrine: Negative for polydipsia, polyphagia and polyuria.   Genitourinary: Negative for dysuria, frequency and urgency.   Musculoskeletal: Negative for arthralgias, back pain, gait problem and joint swelling.   Skin: Negative for color change, dry skin, rash and skin lesions.   Allergic/Immunologic: Negative for environmental allergies, food allergies and immunocompromised state.   Neurological: Negative for dizziness, seizures, speech difficulty, weakness, light-headedness, memory problem and confusion.   Hematological: Negative for adenopathy. Does not bruise/bleed easily.   Psychiatric/Behavioral: Negative for sleep disturbance, negative for hyperactivity and depressed mood. The patient is not nervous/anxious.        Vitals:    11/21/18 0916   BP: 106/70   Pulse: 60   SpO2: 98%       Physical Exam   Constitutional: He is oriented to person, place, and time. He appears well-developed and well-nourished. No distress.   HENT:   Head: Normocephalic and atraumatic.   Right Ear: External ear normal.   Left Ear: External ear normal.   Nose: Nose normal.   Mouth/Throat: Oropharynx is clear and moist. No oropharyngeal exudate.   Eyes: Conjunctivae and EOM are normal. Pupils are equal, round, and reactive to light. Right eye exhibits no discharge. Left eye exhibits no discharge.   Neck: Normal range of motion. Neck supple. No JVD present.   Cardiovascular: Normal rate and regular rhythm.   No murmur heard.  Pulmonary/Chest:  Effort normal and breath sounds normal. No respiratory distress. He has no wheezes.   AICD in place   Abdominal: Soft. Bowel sounds are normal. He exhibits no distension. There is no tenderness.   Musculoskeletal: Normal range of motion. He exhibits no edema or deformity.   Neurological: He is alert and oriented to person, place, and time. He displays normal reflexes. No cranial nerve deficit. Coordination normal.   Skin: Skin is warm and dry. No rash noted. He is not diaphoretic. No erythema.   Psychiatric: He has a normal mood and affect. His behavior is normal. Thought content normal.   Nursing note and vitals reviewed.      Pulmonary Functions Testing Results:    No results found for: FEV1, FVC, QWN5HAR, TLC, DLCO  My interpretation of PFTs performed at [facility] on 10-10-18 reflects moderate obstructive disease with reduced mid flows, and volumes are normal and not consistent with gas trapping, he had a otter diffusion impairment when corrected for alveolar volume remained moderate    CXR: My interpretation of his last chest x-ray performed at [facility] on 7-5-18 reflects mild cardiomegaly, no signs of volume overload, lung fields are clear, AICD to the left chest        Jadon was seen today for copd.    Diagnoses and all orders for this visit:    COPD, group B, by GOLD 2017 classification (CMS/[facility])  -     Fluticasone-Umeclidin-Vilant (TRELEGY ELLIPTA) 100-62.5-25 MCG/INH aerosol powder ; Inhale 1 each Daily.  -     Glycopyrrolate-Formoterol (BEVESPI AEROSPHERE) 9-4.8 MCG/ACT aerosol; Inhale 2 sprays 2 (Two) Times a Day.    S/P implantation of automatic cardioverter/defibrillator (AICD)    Venous insufficiency    PVD (peripheral vascular disease) (CMS/[facility])    Ventricular tachycardia (CMS/[facility])    Stage 3 chronic kidney disease (CMS/[facility])    Essential hypertension    Chronic systolic congestive heart failure (CMS/[facility])    Coronary artery disease involving native coronary artery of native heart  without angina pectoris      Patient's Body mass index is 26.78 kg/m². BMI is normal. Will let PCP address.    Patient was given 2 weeks worth of Trelegy trial.  After completion of the Trelegy was given 2 weeks worth of Bevespi to trial.  We will bring him back after his completed both of those courses and repeat PFTs with diffusion.  This shortness of breath is unimproved with bronchodilators and worsened accompanied by worsening diffusion impairment would consider discontinuing the amiodarone therapy.  Otherwise his current diffusion is almost normal and we will defer decisions about continuation of the amiodarone to cardiology.     Katie Parra, APRN  11/21/2018  9:45 AM    Return in about 6 weeks (around 1/2/2019) for FVL with diffusion.

## 2018-11-21 ENCOUNTER — OFFICE VISIT (OUTPATIENT)
Dept: PULMONOLOGY | Facility: CLINIC | Age: 68
End: 2018-11-21

## 2018-11-21 VITALS
DIASTOLIC BLOOD PRESSURE: 70 MMHG | HEART RATE: 60 BPM | WEIGHT: 220 LBS | BODY MASS INDEX: 26.79 KG/M2 | OXYGEN SATURATION: 98 % | HEIGHT: 76 IN | SYSTOLIC BLOOD PRESSURE: 106 MMHG

## 2018-11-21 DIAGNOSIS — I47.20 VENTRICULAR TACHYCARDIA (HCC): ICD-10-CM

## 2018-11-21 DIAGNOSIS — N18.30 STAGE 3 CHRONIC KIDNEY DISEASE (HCC): ICD-10-CM

## 2018-11-21 DIAGNOSIS — Z95.810 S/P IMPLANTATION OF AUTOMATIC CARDIOVERTER/DEFIBRILLATOR (AICD): ICD-10-CM

## 2018-11-21 DIAGNOSIS — I25.10 CORONARY ARTERY DISEASE INVOLVING NATIVE CORONARY ARTERY OF NATIVE HEART WITHOUT ANGINA PECTORIS: ICD-10-CM

## 2018-11-21 DIAGNOSIS — I50.22 CHRONIC SYSTOLIC CONGESTIVE HEART FAILURE (HCC): ICD-10-CM

## 2018-11-21 DIAGNOSIS — J44.9 COPD, GROUP B, BY GOLD 2017 CLASSIFICATION (HCC): Primary | ICD-10-CM

## 2018-11-21 DIAGNOSIS — I73.9 PVD (PERIPHERAL VASCULAR DISEASE) (HCC): ICD-10-CM

## 2018-11-21 DIAGNOSIS — I87.2 VENOUS INSUFFICIENCY: ICD-10-CM

## 2018-11-21 DIAGNOSIS — I10 ESSENTIAL HYPERTENSION: ICD-10-CM

## 2018-11-21 PROCEDURE — 99214 OFFICE O/P EST MOD 30 MIN: CPT | Performed by: NURSE PRACTITIONER

## 2018-11-30 ENCOUNTER — TELEPHONE (OUTPATIENT)
Dept: PULMONOLOGY | Facility: CLINIC | Age: 68
End: 2018-11-30

## 2018-11-30 ENCOUNTER — TELEPHONE (OUTPATIENT)
Dept: CARDIOLOGY | Facility: CLINIC | Age: 68
End: 2018-11-30

## 2018-12-04 NOTE — TELEPHONE ENCOUNTER
Pt informed.  He wants to see the EP in [place].  I will pend an order to Dr. Mckeon for the referral.  Pt aslo told me that he is taking the Amiodarone just once a day now and having no problems.  Gerald Back, CMA

## 2018-12-04 NOTE — TELEPHONE ENCOUNTER
He is having some NSVT on AICD checks and will need to be referred to EP for their recommendations

## 2018-12-14 DIAGNOSIS — I48.0 PAROXYSMAL ATRIAL FIBRILLATION (HCC): Primary | ICD-10-CM

## 2018-12-21 NOTE — TELEPHONE ENCOUNTER
I can see the order but when I signed it, I get an error message stating that the order has already been placed

## 2018-12-28 ENCOUNTER — HOSPITAL ENCOUNTER (INPATIENT)
Facility: HOSPITAL | Age: 68
LOS: 1 days | Discharge: HOME | End: 2018-12-30
Attending: EMERGENCY MEDICINE | Admitting: INTERNAL MEDICINE

## 2018-12-28 DIAGNOSIS — I21.4 NSTEMI (NON-ST ELEVATED MYOCARDIAL INFARCTION) (HCC): Primary | ICD-10-CM

## 2018-12-28 LAB
HOLD SPECIMEN: NORMAL
HOLD SPECIMEN: NORMAL
TROPONIN I SERPL-MCNC: 0.1 NG/ML (ref 0–0.03)
TROPONIN I SERPL-MCNC: 0.25 NG/ML (ref 0–0.03)
TROPONIN I SERPL-MCNC: 0.39 NG/ML (ref 0–0.03)
WHOLE BLOOD HOLD SPECIMEN: NORMAL
WHOLE BLOOD HOLD SPECIMEN: NORMAL

## 2018-12-28 PROCEDURE — C1874 STENT, COATED/COV W/DEL SYS: HCPCS | Performed by: INTERNAL MEDICINE

## 2018-12-28 PROCEDURE — C1876 STENT, NON-COA/NON-COV W/DEL: HCPCS | Performed by: INTERNAL MEDICINE

## 2018-12-28 PROCEDURE — 80061 LIPID PANEL: CPT | Performed by: NURSE PRACTITIONER

## 2018-12-28 PROCEDURE — 75625 CONTRAST EXAM ABDOMINL AORTA: CPT | Performed by: INTERNAL MEDICINE

## 2018-12-28 PROCEDURE — C1760 CLOSURE DEV, VASC: HCPCS | Performed by: INTERNAL MEDICINE

## 2018-12-28 PROCEDURE — G0378 HOSPITAL OBSERVATION PER HR: HCPCS

## 2018-12-28 PROCEDURE — C1769 GUIDE WIRE: HCPCS | Performed by: INTERNAL MEDICINE

## 2018-12-28 PROCEDURE — B2151ZZ FLUOROSCOPY OF LEFT HEART USING LOW OSMOLAR CONTRAST: ICD-10-PCS | Performed by: INTERNAL MEDICINE

## 2018-12-28 PROCEDURE — 94760 N-INVAS EAR/PLS OXIMETRY 1: CPT

## 2018-12-28 PROCEDURE — 99285 EMERGENCY DEPT VISIT HI MDM: CPT

## 2018-12-28 PROCEDURE — C1894 INTRO/SHEATH, NON-LASER: HCPCS | Performed by: INTERNAL MEDICINE

## 2018-12-28 PROCEDURE — 84484 ASSAY OF TROPONIN QUANT: CPT | Performed by: INTERNAL MEDICINE

## 2018-12-28 PROCEDURE — C1725 CATH, TRANSLUMIN NON-LASER: HCPCS | Performed by: INTERNAL MEDICINE

## 2018-12-28 PROCEDURE — C9600 PERC DRUG-EL COR STENT SING: HCPCS | Performed by: INTERNAL MEDICINE

## 2018-12-28 PROCEDURE — 93458 L HRT ARTERY/VENTRICLE ANGIO: CPT | Performed by: INTERNAL MEDICINE

## 2018-12-28 PROCEDURE — 25010000002 FENTANYL CITRATE (PF) 100 MCG/2ML SOLUTION: Performed by: INTERNAL MEDICINE

## 2018-12-28 PROCEDURE — 84484 ASSAY OF TROPONIN QUANT: CPT | Performed by: EMERGENCY MEDICINE

## 2018-12-28 PROCEDURE — 93571 IV DOP VEL&/PRESS C FLO 1ST: CPT | Performed by: INTERNAL MEDICINE

## 2018-12-28 PROCEDURE — 93005 ELECTROCARDIOGRAM TRACING: CPT | Performed by: EMERGENCY MEDICINE

## 2018-12-28 PROCEDURE — C1887 CATHETER, GUIDING: HCPCS | Performed by: INTERNAL MEDICINE

## 2018-12-28 PROCEDURE — B2111ZZ FLUOROSCOPY OF MULTIPLE CORONARY ARTERIES USING LOW OSMOLAR CONTRAST: ICD-10-PCS | Performed by: INTERNAL MEDICINE

## 2018-12-28 PROCEDURE — 93010 ELECTROCARDIOGRAM REPORT: CPT | Performed by: INTERNAL MEDICINE

## 2018-12-28 PROCEDURE — 25010000002 IOPAMIDOL 61 % SOLUTION: Performed by: INTERNAL MEDICINE

## 2018-12-28 PROCEDURE — 4A023N7 MEASUREMENT OF CARDIAC SAMPLING AND PRESSURE, LEFT HEART, PERCUTANEOUS APPROACH: ICD-10-PCS | Performed by: INTERNAL MEDICINE

## 2018-12-28 PROCEDURE — 25010000002 DIPHENHYDRAMINE PER 50 MG: Performed by: INTERNAL MEDICINE

## 2018-12-28 PROCEDURE — 027034Z DILATION OF CORONARY ARTERY, ONE ARTERY WITH DRUG-ELUTING INTRALUMINAL DEVICE, PERCUTANEOUS APPROACH: ICD-10-PCS | Performed by: INTERNAL MEDICINE

## 2018-12-28 PROCEDURE — 99223 1ST HOSP IP/OBS HIGH 75: CPT | Performed by: INTERNAL MEDICINE

## 2018-12-28 PROCEDURE — 047H3DZ DILATION OF RIGHT EXTERNAL ILIAC ARTERY WITH INTRALUMINAL DEVICE, PERCUTANEOUS APPROACH: ICD-10-PCS | Performed by: INTERNAL MEDICINE

## 2018-12-28 PROCEDURE — 37221 PR REVSC OPN/PRQ ILIAC ART W/STNT PLMT & ANGIOPLSTY: CPT | Performed by: INTERNAL MEDICINE

## 2018-12-28 PROCEDURE — 4A033BC MEASUREMENT OF ARTERIAL PRESSURE, CORONARY, PERCUTANEOUS APPROACH: ICD-10-PCS | Performed by: INTERNAL MEDICINE

## 2018-12-28 PROCEDURE — 94640 AIRWAY INHALATION TREATMENT: CPT

## 2018-12-28 PROCEDURE — 75716 ARTERY X-RAYS ARMS/LEGS: CPT | Performed by: INTERNAL MEDICINE

## 2018-12-28 PROCEDURE — 25010000002 MIDAZOLAM PER 1 MG: Performed by: INTERNAL MEDICINE

## 2018-12-28 PROCEDURE — 94799 UNLISTED PULMONARY SVC/PX: CPT

## 2018-12-28 PROCEDURE — 84443 ASSAY THYROID STIM HORMONE: CPT | Performed by: NURSE PRACTITIONER

## 2018-12-28 PROCEDURE — 92928 PRQ TCAT PLMT NTRAC ST 1 LES: CPT | Performed by: INTERNAL MEDICINE

## 2018-12-28 DEVICE — SELF-EXPANDING STENT SYSTEM
Type: IMPLANTABLE DEVICE | Status: FUNCTIONAL
Brand: EPIC™ VASCULAR

## 2018-12-28 DEVICE — XIENCE SIERRA™ EVEROLIMUS ELUTING CORONARY STENT SYSTEM 3.00 MM X 28 MM / RAPID-EXCHANGE
Type: IMPLANTABLE DEVICE | Status: FUNCTIONAL
Brand: XIENCE SIERRA™

## 2018-12-28 DEVICE — OMNILINK ELITE VASCULAR BALLOON-EXPANDABLE STENT SYSTEM 8.0 MM X 39 MM X 80 CM OVER-THE-WIRE
Type: IMPLANTABLE DEVICE | Status: FUNCTIONAL
Brand: OMNILINK ELITE

## 2018-12-28 RX ORDER — DIPHENHYDRAMINE HCL 25 MG
25 CAPSULE ORAL ONCE
Status: CANCELLED | OUTPATIENT
Start: 2018-12-28 | End: 2018-12-28

## 2018-12-28 RX ORDER — BUDESONIDE AND FORMOTEROL FUMARATE DIHYDRATE 160; 4.5 UG/1; UG/1
2 AEROSOL RESPIRATORY (INHALATION)
Status: DISCONTINUED | OUTPATIENT
Start: 2018-12-28 | End: 2018-12-30 | Stop reason: HOSPADM

## 2018-12-28 RX ORDER — AMIODARONE HYDROCHLORIDE 200 MG/1
200 TABLET ORAL 2 TIMES DAILY
Status: DISCONTINUED | OUTPATIENT
Start: 2018-12-28 | End: 2018-12-30 | Stop reason: HOSPADM

## 2018-12-28 RX ORDER — FENTANYL CITRATE 50 UG/ML
INJECTION, SOLUTION INTRAMUSCULAR; INTRAVENOUS AS NEEDED
Status: DISCONTINUED | OUTPATIENT
Start: 2018-12-28 | End: 2018-12-28 | Stop reason: HOSPADM

## 2018-12-28 RX ORDER — LISINOPRIL 10 MG/1
10 TABLET ORAL DAILY
Status: DISCONTINUED | OUTPATIENT
Start: 2018-12-29 | End: 2018-12-30 | Stop reason: HOSPADM

## 2018-12-28 RX ORDER — BUDESONIDE AND FORMOTEROL FUMARATE DIHYDRATE 160; 4.5 UG/1; UG/1
2 AEROSOL RESPIRATORY (INHALATION)
Status: DISCONTINUED | OUTPATIENT
Start: 2018-12-28 | End: 2018-12-28 | Stop reason: SDUPTHER

## 2018-12-28 RX ORDER — FUROSEMIDE 40 MG/1
40 TABLET ORAL DAILY
Status: DISCONTINUED | OUTPATIENT
Start: 2018-12-29 | End: 2018-12-30 | Stop reason: HOSPADM

## 2018-12-28 RX ORDER — HYDROCODONE BITARTRATE AND ACETAMINOPHEN 7.5; 325 MG/1; MG/1
1 TABLET ORAL EVERY 8 HOURS PRN
Status: DISCONTINUED | OUTPATIENT
Start: 2018-12-28 | End: 2018-12-30 | Stop reason: HOSPADM

## 2018-12-28 RX ORDER — CLOPIDOGREL BISULFATE 75 MG/1
75 TABLET ORAL DAILY
Status: DISCONTINUED | OUTPATIENT
Start: 2018-12-29 | End: 2018-12-29

## 2018-12-28 RX ORDER — SODIUM CHLORIDE 9 MG/ML
125 INJECTION, SOLUTION INTRAVENOUS ONCE
Status: COMPLETED | OUTPATIENT
Start: 2018-12-28 | End: 2018-12-28

## 2018-12-28 RX ORDER — SODIUM CHLORIDE 9 MG/ML
125 INJECTION, SOLUTION INTRAVENOUS CONTINUOUS
Status: DISPENSED | OUTPATIENT
Start: 2018-12-28 | End: 2018-12-29

## 2018-12-28 RX ORDER — CARVEDILOL 25 MG/1
25 TABLET ORAL 2 TIMES DAILY WITH MEALS
Status: DISCONTINUED | OUTPATIENT
Start: 2018-12-28 | End: 2018-12-30 | Stop reason: HOSPADM

## 2018-12-28 RX ORDER — ALLOPURINOL 100 MG/1
100 TABLET ORAL DAILY
Status: DISCONTINUED | OUTPATIENT
Start: 2018-12-29 | End: 2018-12-30 | Stop reason: HOSPADM

## 2018-12-28 RX ORDER — MIDAZOLAM HYDROCHLORIDE 1 MG/ML
INJECTION INTRAMUSCULAR; INTRAVENOUS AS NEEDED
Status: DISCONTINUED | OUTPATIENT
Start: 2018-12-28 | End: 2018-12-28 | Stop reason: HOSPADM

## 2018-12-28 RX ORDER — LIDOCAINE HYDROCHLORIDE 20 MG/ML
INJECTION, SOLUTION INFILTRATION; PERINEURAL AS NEEDED
Status: DISCONTINUED | OUTPATIENT
Start: 2018-12-28 | End: 2018-12-28 | Stop reason: HOSPADM

## 2018-12-28 RX ORDER — ATORVASTATIN CALCIUM 40 MG/1
40 TABLET, FILM COATED ORAL NIGHTLY
Status: DISCONTINUED | OUTPATIENT
Start: 2018-12-28 | End: 2018-12-30 | Stop reason: HOSPADM

## 2018-12-28 RX ORDER — DIPHENHYDRAMINE HYDROCHLORIDE 50 MG/ML
INJECTION INTRAMUSCULAR; INTRAVENOUS AS NEEDED
Status: DISCONTINUED | OUTPATIENT
Start: 2018-12-28 | End: 2018-12-28 | Stop reason: HOSPADM

## 2018-12-28 RX ORDER — LIDOCAINE HYDROCHLORIDE 10 MG/ML
0.1 INJECTION, SOLUTION EPIDURAL; INFILTRATION; INTRACAUDAL; PERINEURAL ONCE AS NEEDED
Status: CANCELLED | OUTPATIENT
Start: 2018-12-28

## 2018-12-28 RX ORDER — ASPIRIN 81 MG/1
81 TABLET ORAL DAILY
Status: DISCONTINUED | OUTPATIENT
Start: 2018-12-29 | End: 2018-12-30 | Stop reason: HOSPADM

## 2018-12-28 RX ORDER — CLOPIDOGREL BISULFATE 75 MG/1
TABLET ORAL AS NEEDED
Status: DISCONTINUED | OUTPATIENT
Start: 2018-12-28 | End: 2018-12-28 | Stop reason: HOSPADM

## 2018-12-28 RX ADMIN — SODIUM CHLORIDE 125 ML/HR: 9 INJECTION, SOLUTION INTRAVENOUS at 15:46

## 2018-12-28 RX ADMIN — CARVEDILOL 25 MG: 25 TABLET, FILM COATED ORAL at 19:57

## 2018-12-28 RX ADMIN — IPRATROPIUM BROMIDE 0.5 MG: 0.5 SOLUTION RESPIRATORY (INHALATION) at 19:45

## 2018-12-28 RX ADMIN — BUDESONIDE AND FORMOTEROL FUMARATE DIHYDRATE 2 PUFF: 160; 4.5 AEROSOL RESPIRATORY (INHALATION) at 19:45

## 2018-12-28 RX ADMIN — AMIODARONE HYDROCHLORIDE 200 MG: 200 TABLET ORAL at 19:58

## 2018-12-28 RX ADMIN — DESMOPRESSIN ACETATE 40 MG: 0.2 TABLET ORAL at 19:57

## 2018-12-29 ENCOUNTER — APPOINTMENT (OUTPATIENT)
Dept: CARDIOLOGY | Facility: HOSPITAL | Age: 68
End: 2018-12-29
Attending: INTERNAL MEDICINE

## 2018-12-29 LAB
ANION GAP SERPL CALCULATED.3IONS-SCNC: 13 MMOL/L (ref 4–13)
ARTICHOKE IGE QN: 102 MG/DL (ref 0–99)
BUN BLD-MCNC: 42 MG/DL (ref 5–21)
BUN/CREAT SERPL: 19.9 (ref 7–25)
CALCIUM SPEC-SCNC: 8.7 MG/DL (ref 8.4–10.4)
CHLORIDE SERPL-SCNC: 101 MMOL/L (ref 98–110)
CHOLEST SERPL-MCNC: 148 MG/DL (ref 130–200)
CO2 SERPL-SCNC: 23 MMOL/L (ref 24–31)
CREAT BLD-MCNC: 2.11 MG/DL (ref 0.5–1.4)
DEPRECATED RDW RBC AUTO: 58.3 FL (ref 40–54)
ERYTHROCYTE [DISTWIDTH] IN BLOOD BY AUTOMATED COUNT: 16.4 % (ref 12–15)
GFR SERPL CREATININE-BSD FRML MDRD: 31 ML/MIN/1.73
GLUCOSE BLD-MCNC: 155 MG/DL (ref 70–100)
HBA1C MFR BLD: 5.8 %
HCT VFR BLD AUTO: 29.1 % (ref 40–52)
HCT VFR BLD AUTO: 29.5 % (ref 40–52)
HDLC SERPL-MCNC: 41 MG/DL
HGB BLD-MCNC: 9.8 G/DL (ref 14–18)
LDLC/HDLC SERPL: 2.25 {RATIO}
MCH RBC QN AUTO: 32.8 PG (ref 28–32)
MCHC RBC AUTO-ENTMCNC: 33.2 G/DL (ref 33–36)
MCV RBC AUTO: 98.7 FL (ref 82–95)
PA ADP PRP-ACNC: 91 PRU
PLATELET # BLD AUTO: 523 10*3/MM3 (ref 130–400)
PLATELET # BLD AUTO: 524 10*3/MM3 (ref 130–400)
PMV BLD AUTO: 12.2 FL (ref 6–12)
POTASSIUM BLD-SCNC: 4.8 MMOL/L (ref 3.5–5.3)
RBC # BLD AUTO: 2.99 10*6/MM3 (ref 4.8–5.9)
SODIUM BLD-SCNC: 137 MMOL/L (ref 135–145)
TRIGL SERPL-MCNC: 73 MG/DL (ref 0–149)
TROPONIN I SERPL-MCNC: 0.47 NG/ML (ref 0–0.03)
TSH SERPL DL<=0.05 MIU/L-ACNC: 2 MIU/ML (ref 0.47–4.68)
WBC NRBC COR # BLD: 16.68 10*3/MM3 (ref 4.8–10.8)

## 2018-12-29 PROCEDURE — 94760 N-INVAS EAR/PLS OXIMETRY 1: CPT

## 2018-12-29 PROCEDURE — 93005 ELECTROCARDIOGRAM TRACING: CPT | Performed by: INTERNAL MEDICINE

## 2018-12-29 PROCEDURE — 93306 TTE W/DOPPLER COMPLETE: CPT | Performed by: INTERNAL MEDICINE

## 2018-12-29 PROCEDURE — 83036 HEMOGLOBIN GLYCOSYLATED A1C: CPT | Performed by: NURSE PRACTITIONER

## 2018-12-29 PROCEDURE — 93306 TTE W/DOPPLER COMPLETE: CPT

## 2018-12-29 PROCEDURE — 93010 ELECTROCARDIOGRAM REPORT: CPT | Performed by: INTERNAL MEDICINE

## 2018-12-29 PROCEDURE — 25010000002 PERFLUTREN 6.52 MG/ML SUSPENSION: Performed by: INTERNAL MEDICINE

## 2018-12-29 PROCEDURE — 94799 UNLISTED PULMONARY SVC/PX: CPT

## 2018-12-29 PROCEDURE — 84484 ASSAY OF TROPONIN QUANT: CPT | Performed by: INTERNAL MEDICINE

## 2018-12-29 PROCEDURE — 85027 COMPLETE CBC AUTOMATED: CPT | Performed by: INTERNAL MEDICINE

## 2018-12-29 PROCEDURE — 80048 BASIC METABOLIC PNL TOTAL CA: CPT | Performed by: INTERNAL MEDICINE

## 2018-12-29 PROCEDURE — 85576 BLOOD PLATELET AGGREGATION: CPT | Performed by: INTERNAL MEDICINE

## 2018-12-29 RX ADMIN — CARVEDILOL 25 MG: 25 TABLET, FILM COATED ORAL at 08:00

## 2018-12-29 RX ADMIN — ALLOPURINOL 100 MG: 100 TABLET ORAL at 08:00

## 2018-12-29 RX ADMIN — AMIODARONE HYDROCHLORIDE 200 MG: 200 TABLET ORAL at 08:00

## 2018-12-29 RX ADMIN — ASPIRIN 81 MG: 81 TABLET, DELAYED RELEASE ORAL at 07:59

## 2018-12-29 RX ADMIN — IPRATROPIUM BROMIDE 0.5 MG: 0.5 SOLUTION RESPIRATORY (INHALATION) at 06:27

## 2018-12-29 RX ADMIN — IPRATROPIUM BROMIDE 0.5 MG: 0.5 SOLUTION RESPIRATORY (INHALATION) at 13:33

## 2018-12-29 RX ADMIN — FUROSEMIDE 40 MG: 40 TABLET ORAL at 07:59

## 2018-12-29 RX ADMIN — PERFLUTREN 9.78 MG: 6.52 INJECTION, SUSPENSION INTRAVENOUS at 17:00

## 2018-12-29 RX ADMIN — BUDESONIDE AND FORMOTEROL FUMARATE DIHYDRATE 2 PUFF: 160; 4.5 AEROSOL RESPIRATORY (INHALATION) at 18:31

## 2018-12-29 RX ADMIN — LISINOPRIL 10 MG: 10 TABLET ORAL at 07:56

## 2018-12-29 RX ADMIN — DESMOPRESSIN ACETATE 40 MG: 0.2 TABLET ORAL at 21:56

## 2018-12-29 RX ADMIN — AMIODARONE HYDROCHLORIDE 200 MG: 200 TABLET ORAL at 21:04

## 2018-12-29 RX ADMIN — IPRATROPIUM BROMIDE 0.5 MG: 0.5 SOLUTION RESPIRATORY (INHALATION) at 18:30

## 2018-12-29 RX ADMIN — TICAGRELOR 180 MG: 90 TABLET ORAL at 12:40

## 2018-12-29 RX ADMIN — CARVEDILOL 25 MG: 25 TABLET, FILM COATED ORAL at 17:29

## 2018-12-29 RX ADMIN — TICAGRELOR 90 MG: 90 TABLET ORAL at 21:56

## 2018-12-29 RX ADMIN — BUDESONIDE AND FORMOTEROL FUMARATE DIHYDRATE 2 PUFF: 160; 4.5 AEROSOL RESPIRATORY (INHALATION) at 06:27

## 2018-12-30 VITALS
OXYGEN SATURATION: 97 % | WEIGHT: 217 LBS | TEMPERATURE: 98 F | HEART RATE: 79 BPM | BODY MASS INDEX: 26.42 KG/M2 | HEIGHT: 76 IN | RESPIRATION RATE: 18 BRPM | DIASTOLIC BLOOD PRESSURE: 63 MMHG | SYSTOLIC BLOOD PRESSURE: 126 MMHG

## 2018-12-30 LAB
ANION GAP SERPL CALCULATED.3IONS-SCNC: 11 MMOL/L (ref 4–13)
BH CV ECHO MEAS - AO MAX PG (FULL): 2.5 MMHG
BH CV ECHO MEAS - AO MAX PG: 10 MMHG
BH CV ECHO MEAS - AO MEAN PG (FULL): 1 MMHG
BH CV ECHO MEAS - AO MEAN PG: 5 MMHG
BH CV ECHO MEAS - AO ROOT AREA (BSA CORRECTED): 1.1
BH CV ECHO MEAS - AO ROOT AREA: 5.3 CM^2
BH CV ECHO MEAS - AO ROOT DIAM: 2.6 CM
BH CV ECHO MEAS - AO V2 MAX: 158 CM/SEC
BH CV ECHO MEAS - AO V2 MEAN: 106 CM/SEC
BH CV ECHO MEAS - AO V2 VTI: 35.8 CM
BH CV ECHO MEAS - AVA(I,A): 2.9 CM^2
BH CV ECHO MEAS - AVA(I,D): 2.9 CM^2
BH CV ECHO MEAS - AVA(V,A): 3.3 CM^2
BH CV ECHO MEAS - AVA(V,D): 3.3 CM^2
BH CV ECHO MEAS - BSA(HAYCOCK): 2.3 M^2
BH CV ECHO MEAS - BSA: 2.3 M^2
BH CV ECHO MEAS - BZI_BMI: 26.3 KILOGRAMS/M^2
BH CV ECHO MEAS - BZI_METRIC_HEIGHT: 193 CM
BH CV ECHO MEAS - BZI_METRIC_WEIGHT: 98 KG
BH CV ECHO MEAS - EDV(CUBED): 405.2 ML
BH CV ECHO MEAS - EDV(MOD-SP4): 307 ML
BH CV ECHO MEAS - EDV(TEICH): 289.4 ML
BH CV ECHO MEAS - EF(CUBED): 51.9 %
BH CV ECHO MEAS - EF(MOD-SP4): 32.6 %
BH CV ECHO MEAS - EF(TEICH): 42.5 %
BH CV ECHO MEAS - ESV(CUBED): 195.1 ML
BH CV ECHO MEAS - ESV(MOD-SP4): 207 ML
BH CV ECHO MEAS - ESV(TEICH): 166.6 ML
BH CV ECHO MEAS - FS: 21.6 %
BH CV ECHO MEAS - IVS/LVPW: 0.91
BH CV ECHO MEAS - IVSD: 1 CM
BH CV ECHO MEAS - LA DIMENSION: 5 CM
BH CV ECHO MEAS - LA/AO: 1.9
BH CV ECHO MEAS - LAT PEAK E' VEL: 13.7 CM/SEC
BH CV ECHO MEAS - LV DIASTOLIC VOL/BSA (35-75): 134.1 ML/M^2
BH CV ECHO MEAS - LV MASS(C)D: 376.8 GRAMS
BH CV ECHO MEAS - LV MASS(C)DI: 164.6 GRAMS/M^2
BH CV ECHO MEAS - LV MAX PG: 7.5 MMHG
BH CV ECHO MEAS - LV MEAN PG: 4 MMHG
BH CV ECHO MEAS - LV SYSTOLIC VOL/BSA (12-30): 90.4 ML/M^2
BH CV ECHO MEAS - LV V1 MAX: 137 CM/SEC
BH CV ECHO MEAS - LV V1 MEAN: 92.6 CM/SEC
BH CV ECHO MEAS - LV V1 VTI: 27.3 CM
BH CV ECHO MEAS - LVIDD: 7.4 CM
BH CV ECHO MEAS - LVIDS: 5.8 CM
BH CV ECHO MEAS - LVLD AP4: 10.5 CM
BH CV ECHO MEAS - LVLS AP4: 9.2 CM
BH CV ECHO MEAS - LVOT AREA (M): 3.8 CM^2
BH CV ECHO MEAS - LVOT AREA: 3.8 CM^2
BH CV ECHO MEAS - LVOT DIAM: 2.2 CM
BH CV ECHO MEAS - LVPWD: 1.1 CM
BH CV ECHO MEAS - MED PEAK E' VEL: 4.46 CM/SEC
BH CV ECHO MEAS - MR ALIAS VEL: 23.1 CM/SEC
BH CV ECHO MEAS - MR ERO: 0.05 CM^2
BH CV ECHO MEAS - MR FLOW RATE: 23.2 CM^3/SEC
BH CV ECHO MEAS - MR MAX PG: 78.9 MMHG
BH CV ECHO MEAS - MR MAX VEL: 444 CM/SEC
BH CV ECHO MEAS - MR MEAN PG: 51 MMHG
BH CV ECHO MEAS - MR MEAN VEL: 338 CM/SEC
BH CV ECHO MEAS - MR PISA RADIUS: 0.4 CM
BH CV ECHO MEAS - MR PISA: 1 CM^2
BH CV ECHO MEAS - MR VOLUME: 7.7 ML
BH CV ECHO MEAS - MR VTI: 147 CM
BH CV ECHO MEAS - MV A MAX VEL: 41.1 CM/SEC
BH CV ECHO MEAS - MV DEC TIME: 0.17 SEC
BH CV ECHO MEAS - MV E MAX VEL: 125 CM/SEC
BH CV ECHO MEAS - MV E/A: 3
BH CV ECHO MEAS - PA MAX PG: 1.2 MMHG
BH CV ECHO MEAS - PA V2 MAX: 53.8 CM/SEC
BH CV ECHO MEAS - PI END-D VEL: 219 CM/SEC
BH CV ECHO MEAS - RAP SYSTOLE: 5 MMHG
BH CV ECHO MEAS - RVSP: 64.3 MMHG
BH CV ECHO MEAS - SI(AO): 83 ML/M^2
BH CV ECHO MEAS - SI(CUBED): 91.8 ML/M^2
BH CV ECHO MEAS - SI(LVOT): 45.3 ML/M^2
BH CV ECHO MEAS - SI(MOD-SP4): 43.7 ML/M^2
BH CV ECHO MEAS - SI(TEICH): 53.7 ML/M^2
BH CV ECHO MEAS - SV(AO): 190.1 ML
BH CV ECHO MEAS - SV(CUBED): 210.1 ML
BH CV ECHO MEAS - SV(LVOT): 103.8 ML
BH CV ECHO MEAS - SV(MOD-SP4): 100 ML
BH CV ECHO MEAS - SV(TEICH): 122.9 ML
BH CV ECHO MEAS - TR MAX VEL: 385 CM/SEC
BH CV ECHO MEASUREMENTS AVERAGE E/E' RATIO: 13.77
BUN BLD-MCNC: 41 MG/DL (ref 5–21)
BUN/CREAT SERPL: 19.3 (ref 7–25)
CALCIUM SPEC-SCNC: 8.9 MG/DL (ref 8.4–10.4)
CHLORIDE SERPL-SCNC: 105 MMOL/L (ref 98–110)
CO2 SERPL-SCNC: 23 MMOL/L (ref 24–31)
CREAT BLD-MCNC: 2.12 MG/DL (ref 0.5–1.4)
DEPRECATED RDW RBC AUTO: 55.9 FL (ref 40–54)
ERYTHROCYTE [DISTWIDTH] IN BLOOD BY AUTOMATED COUNT: 16.3 % (ref 12–15)
GFR SERPL CREATININE-BSD FRML MDRD: 31 ML/MIN/1.73
GLUCOSE BLD-MCNC: 94 MG/DL (ref 70–100)
HCT VFR BLD AUTO: 26.4 % (ref 40–52)
HGB BLD-MCNC: 9 G/DL (ref 14–18)
LEFT ATRIUM VOLUME INDEX: 64.6 ML/M2
LEFT ATRIUM VOLUME: 148 CM3
MAXIMAL PREDICTED HEART RATE: 152 BPM
MCH RBC QN AUTO: 32.1 PG (ref 28–32)
MCHC RBC AUTO-ENTMCNC: 34.1 G/DL (ref 33–36)
MCV RBC AUTO: 94.3 FL (ref 82–95)
PLATELET # BLD AUTO: 470 10*3/MM3 (ref 130–400)
PMV BLD AUTO: 12.5 FL (ref 6–12)
POTASSIUM BLD-SCNC: 4.6 MMOL/L (ref 3.5–5.3)
RBC # BLD AUTO: 2.8 10*6/MM3 (ref 4.8–5.9)
SODIUM BLD-SCNC: 139 MMOL/L (ref 135–145)
STRESS TARGET HR: 129 BPM
WBC NRBC COR # BLD: 16.91 10*3/MM3 (ref 4.8–10.8)

## 2018-12-30 PROCEDURE — 94799 UNLISTED PULMONARY SVC/PX: CPT

## 2018-12-30 PROCEDURE — 85027 COMPLETE CBC AUTOMATED: CPT | Performed by: NURSE PRACTITIONER

## 2018-12-30 PROCEDURE — 80048 BASIC METABOLIC PNL TOTAL CA: CPT | Performed by: NURSE PRACTITIONER

## 2018-12-30 PROCEDURE — 94760 N-INVAS EAR/PLS OXIMETRY 1: CPT

## 2018-12-30 RX ORDER — ATORVASTATIN CALCIUM 40 MG/1
40 TABLET, FILM COATED ORAL DAILY
Qty: 90 TABLET | Refills: 3 | Status: SHIPPED | OUTPATIENT
Start: 2018-12-30 | End: 2020-01-01

## 2018-12-30 RX ADMIN — ALLOPURINOL 100 MG: 100 TABLET ORAL at 08:18

## 2018-12-30 RX ADMIN — BUDESONIDE AND FORMOTEROL FUMARATE DIHYDRATE 2 PUFF: 160; 4.5 AEROSOL RESPIRATORY (INHALATION) at 07:27

## 2018-12-30 RX ADMIN — IPRATROPIUM BROMIDE 0.5 MG: 0.5 SOLUTION RESPIRATORY (INHALATION) at 07:27

## 2018-12-30 RX ADMIN — ASPIRIN 81 MG: 81 TABLET, DELAYED RELEASE ORAL at 08:18

## 2018-12-30 RX ADMIN — IPRATROPIUM BROMIDE 0.5 MG: 0.5 SOLUTION RESPIRATORY (INHALATION) at 01:21

## 2018-12-30 RX ADMIN — AMIODARONE HYDROCHLORIDE 200 MG: 200 TABLET ORAL at 08:18

## 2018-12-30 RX ADMIN — TICAGRELOR 90 MG: 90 TABLET ORAL at 08:17

## 2018-12-30 RX ADMIN — FUROSEMIDE 40 MG: 40 TABLET ORAL at 08:18

## 2018-12-30 RX ADMIN — LISINOPRIL 10 MG: 10 TABLET ORAL at 08:18

## 2018-12-30 RX ADMIN — CARVEDILOL 25 MG: 25 TABLET, FILM COATED ORAL at 08:17

## 2018-12-31 ENCOUNTER — READMISSION MANAGEMENT (OUTPATIENT)
Dept: CALL CENTER | Facility: HOSPITAL | Age: 68
End: 2018-12-31

## 2018-12-31 NOTE — OUTREACH NOTE
Prep Survey      Responses   Facility patient discharged from?  [facility]   Is patient eligible?  Yes   Discharge diagnosis  NSTEMI, PVD, plavix resistance, HTN, CKD, chronic HF, LBBB   Does the patient have one of the following disease processes/diagnoses(primary or secondary)?  Acute MI (STEMI,NSTEMI)   Does the patient have Home health ordered?  No   Is there a DME ordered?  No   Prep survey completed?  Yes          Mally Puckett RN

## 2019-01-02 ENCOUNTER — OFFICE VISIT (OUTPATIENT)
Dept: PULMONOLOGY | Facility: CLINIC | Age: 69
End: 2019-01-02

## 2019-01-02 ENCOUNTER — READMISSION MANAGEMENT (OUTPATIENT)
Dept: CALL CENTER | Facility: HOSPITAL | Age: 69
End: 2019-01-02

## 2019-01-02 VITALS
HEIGHT: 76 IN | WEIGHT: 216 LBS | HEART RATE: 71 BPM | OXYGEN SATURATION: 98 % | SYSTOLIC BLOOD PRESSURE: 110 MMHG | BODY MASS INDEX: 26.3 KG/M2 | DIASTOLIC BLOOD PRESSURE: 60 MMHG

## 2019-01-02 DIAGNOSIS — I25.10 CORONARY ARTERY DISEASE INVOLVING NATIVE CORONARY ARTERY OF NATIVE HEART WITHOUT ANGINA PECTORIS: ICD-10-CM

## 2019-01-02 DIAGNOSIS — Z95.810 S/P IMPLANTATION OF AUTOMATIC CARDIOVERTER/DEFIBRILLATOR (AICD): ICD-10-CM

## 2019-01-02 DIAGNOSIS — I50.22 CHRONIC SYSTOLIC CONGESTIVE HEART FAILURE (HCC): ICD-10-CM

## 2019-01-02 DIAGNOSIS — I15.8 OTHER SECONDARY HYPERTENSION: ICD-10-CM

## 2019-01-02 DIAGNOSIS — J44.9 COPD, GROUP B, BY GOLD 2017 CLASSIFICATION (HCC): Primary | ICD-10-CM

## 2019-01-02 DIAGNOSIS — I47.20 VENTRICULAR TACHYCARDIA (HCC): ICD-10-CM

## 2019-01-02 DIAGNOSIS — N18.30 STAGE 3 CHRONIC KIDNEY DISEASE (HCC): ICD-10-CM

## 2019-01-02 DIAGNOSIS — I73.9 PVD (PERIPHERAL VASCULAR DISEASE) (HCC): ICD-10-CM

## 2019-01-02 DIAGNOSIS — I25.9 ISCHEMIC HEART DISEASE: ICD-10-CM

## 2019-01-02 PROCEDURE — 99214 OFFICE O/P EST MOD 30 MIN: CPT | Performed by: NURSE PRACTITIONER

## 2019-01-02 NOTE — OUTREACH NOTE
AMI Week 1 Survey      Responses   Facility patient discharged from?  [facility]   Does the patient have one of the following disease processes/diagnoses(primary or secondary)?  Acute MI (STEMI,NSTEMI)   Is there a successful TCM telephone encounter documented?  No   Week 1 attempt successful?  No   Unsuccessful attempts  Attempt 1          Eric Silva RN

## 2019-01-04 ENCOUNTER — NURSE TRIAGE (OUTPATIENT)
Dept: CALL CENTER | Facility: HOSPITAL | Age: 69
End: 2019-01-04

## 2019-01-04 ENCOUNTER — READMISSION MANAGEMENT (OUTPATIENT)
Dept: CALL CENTER | Facility: HOSPITAL | Age: 69
End: 2019-01-04

## 2019-01-04 NOTE — TELEPHONE ENCOUNTER
Reason for Disposition  • Busy signal.  First attempt to contact caller.  Follow-up call scheduled within 15 minutes.    Additional Information  • Negative: Caller is angry or rude (e.g., hangs up, verbally abusive, yelling)  • Negative: Caller hangs up  • Negative: Caller has already spoken with the PCP and has no further questions.  • Negative: Caller has already spoken with another triager and has no further questions.  • Negative: Caller has already spoken with another triager or PCP AND has further questions AND triager able to answer questions.    Protocols used: NO CONTACT OR DUPLICATE CONTACT CALL-ADULTThe [facility]

## 2019-01-04 NOTE — OUTREACH NOTE
AMI Week 1 Survey      Responses   Facility patient discharged from?  [place]   Does the patient have one of the following disease processes/diagnoses(primary or secondary)?  Acute MI (STEMI,NSTEMI)   Is there a successful TCM telephone encounter documented?  No   Week 1 attempt successful?  Yes   Call start time  1100   Call end time  1110   Discharge diagnosis  NSTEMI, PVD, plavix resistance, HTN, CKD, chronic HF, LBBB   Meds reviewed with patient/caregiver?  Yes   Is the patient having any side effects they believe may be caused by any medication additions or changes?  No   Does the patient have all prescriptions related to this admission filled (includes statins,anticoagulants,HTN meds,anti-arrhythmia meds)  Yes   Is the patient taking all medications as directed (includes completed medication regime)?  Yes   Does the patient have a primary care provider?   Yes   Does the patient have an appointment with their PCP,cardiologist,or clinic within 7 days of discharge?  No   What is preventing the patient from scheduling follow up appointments within 7 days of discharge?  Waiting on return call   Nursing Interventions  Advised patient to make appointment   Has the patient kept scheduled appointments due by today?  N/A   Comments  Reviewed appt. Pt requested that I help him with his PCP appt.   Has home health visited the patient within 72 hours of discharge?  N/A   Psychosocial issues?  No   Did the patient receive a copy of their discharge instructions?  Yes   Nursing interventions  Reviewed instructions with patient   What is the patient's perception of their health status since discharge?  Improving   Nursing interventions  Nurse provided patient education   Is the patient/caregiver able to teach back signs and symptoms of when to call for help immediately:  Sudden chest discomfort, Nausea or vomiting, Dizziness or lightheadedness, Irregular or rapid heart rate, Sudden sweating or clammy skin, Shortness of breath  at any time, Sudden discomfort in arms, back, neck or jaw   Nursing interventions  Nurse provided patient education   Is the pateint /caregiver able to teach back the importance of cardiac rehab?  Yes   Nursing interventions  Provided education on importance of cardiac rehab   Is the patient/caregiver able to teach back lifestyle changes to help prevent MIs  Heart healthy diet, Regular exercise as approved by provider   Is the patient/caregiver able to teach back ways to prevent a second heart attack:  Follow up with MD, Participate in Cardiac Rehab, Take medications   Is the patient/caregiver able to teach back the hierarchy of who to call/visit for symptoms/problems? PCP, Specialist, Home health nurse, Urgent Care, ED, 911  Yes   Week 1 call completed?  Yes          Eric Silva RN

## 2019-01-04 NOTE — TELEPHONE ENCOUNTER
"Call transferred to Dr. Perry's office number    Reason for Disposition  • Requesting regular office appointment    Additional Information  • Negative: [1] Caller is not with the adult (patient) AND [2] reporting urgent symptoms  • Negative: Lab result questions  • Negative: Medication questions  • Negative: Caller cannot be reached by phone  • Negative: Caller has already spoken to PCP or another triager  • Negative: RN needs further essential information from caller in order to complete triage    Answer Assessment - Initial Assessment Questions  1. REASON FOR CALL or QUESTION: \"What is your reason for calling today?\" or \"How can I best help you?\" or \"What question do you have that I can help answer?\"      PCP has not called to schedule follow-up appointment, caller wants to know why.    Protocols used: INFORMATION ONLY CALL-ADULT-      "

## 2019-01-04 NOTE — TELEPHONE ENCOUNTER
Reason for Disposition  • Second attempt to contact family AND no contact made.  Answering service notified.    Additional Information  • Negative: Caller is angry or rude (e.g., hangs up, verbally abusive, yelling)  • Negative: Caller hangs up  • Negative: Caller has already spoken with the PCP and has no further questions.  • Negative: Caller has already spoken with another triager and has no further questions.  • Negative: Caller has already spoken with another triager or PCP AND has further questions AND triager able to answer questions.  • Negative: Busy signal.  First attempt to contact caller.  Follow-up call scheduled within 15 minutes.  • Negative: No answer.  First attempt to contact caller.  Follow-up call scheduled within 15 minutes.  • Negative: Message left on identified answering machine.  • Negative: Message left on unidentified answering machine.  Answering service notified.  • Negative: Message left with person in household.  • Negative: Wrong number reached.  Answering service notified.    Protocols used: NO CONTACT OR DUPLICATE [facility]TACT [facility]

## 2019-01-08 ENCOUNTER — READMISSION MANAGEMENT (OUTPATIENT)
Dept: CALL CENTER | Facility: HOSPITAL | Age: 69
End: 2019-01-08

## 2019-01-09 NOTE — OUTREACH NOTE
AMI Week 2 Survey      Responses   Facility patient discharged from?  [place]   Does the patient have one of the following disease processes/diagnoses(primary or secondary)?  Acute MI (STEMI,NSTEMI)   Week 2 attempt successful?  Yes   Call start time  1823   Call end time  1826   Discharge diagnosis  NSTEMI, PVD, plavix resistance, HTN, CKD, chronic HF, LBBB   Is patient permission given to speak with other caregiver?  No   Meds reviewed with patient/caregiver?  Yes   Is the patient having any side effects they believe may be caused by any medication additions or changes?  No   Does the patient have all prescriptions related to this admission filled (includes statins,anticoagulants,HTN meds,anti-arrhythmia meds)  Yes   Is the patient taking all medications as directed (includes completed medication regime)?  Yes   Does the patient have a primary care provider?   Yes   Does the patient have an appointment with their PCP,cardiologist,or clinic within 7 days of discharge?  Greater than 7 days   What is preventing the patient from scheduling follow up appointments within 7 days of discharge?  Earlier appointment not available   Nursing Interventions  Verified appointment date/time/provider   Has the patient kept scheduled appointments due by today?  Yes   Comments  He has seen his PCP and Pulmonary MD. He has an appt. with [facility] cardiologly.   Did the patient receive a copy of their discharge instructions?  Yes   Nursing interventions  Reviewed instructions with patient   What is the patient's perception of their health status since discharge?  Improving   Nursing interventions  Nurse provided patient education   Is the patient/caregiver able to teach back signs and symptoms of when to call for help immediately:  Sudden chest discomfort, Nausea or vomiting, Dizziness or lightheadedness, Irregular or rapid heart rate, Sudden sweating or clammy skin, Shortness of breath at any time, Sudden discomfort in arms, back, neck  or jaw   Nursing interventions  Nurse provided patient education   Is the pateint /caregiver able to teach back the importance of cardiac rehab?  Yes   Nursing interventions  Provided education on importance of cardiac rehab [He is awaiting cardiac rehab at [facility]  to call. ]   Is the patient/caregiver able to teach back lifestyle changes to help prevent MIs  Heart healthy diet, Regular exercise as approved by provider   Is the patient/caregiver able to teach back ways to prevent a second heart attack:  Follow up with MD, Participate in Cardiac Rehab, Take medications   Is the patient/caregiver able to teach back the hierarchy of who to call/visit for symptoms/problems? PCP, Specialist, Home health nurse, Urgent Care, ED, 911  Yes   Week 2 call completed?  Yes          Sisi Jacobsen RN

## 2019-01-15 ENCOUNTER — READMISSION MANAGEMENT (OUTPATIENT)
Dept: CALL CENTER | Facility: HOSPITAL | Age: 69
End: 2019-01-15

## 2019-01-15 NOTE — OUTREACH NOTE
AMI Week 3 Survey      Responses   Facility patient discharged from?  [place]   Does the patient have one of the following disease processes/diagnoses(primary or secondary)?  Acute MI (STEMI,NSTEMI)   Week 3 attempt successful?  Yes   Call start time  0907   Call end time  0913   Discharge diagnosis  NSTEMI, PVD, plavix resistance, HTN, CKD, chronic HF, LBBB   Is patient permission given to speak with other caregiver?  No   Does the patient have a primary care provider?   Yes   Does the patient have an appointment with their PCP,cardiologist,or clinic within 7 days of discharge?  Greater than 7 days   Comments regarding PCP  Dr Perry/ PCP   What is preventing the patient from scheduling follow up appointments within 7 days of discharge?  Earlier appointment not available   Nursing Interventions  Verified appointment date/time/provider   Has the patient kept scheduled appointments due by today?  Yes   Comments  He has seen his PCP and Pulmonary MD. He has an appt. with [facility] cardiologly.   Has home health visited the patient within 72 hours of discharge?  N/A   Psychosocial issues?  No   Did the patient receive a copy of their discharge instructions?  Yes   Nursing interventions  Reviewed instructions with patient   What is the patient's perception of their health status since discharge?  Improving   Nursing interventions  Nurse provided patient education   Is the patient/caregiver able to teach back signs and symptoms of when to call for help immediately:  Sudden chest discomfort, Nausea or vomiting, Dizziness or lightheadedness, Irregular or rapid heart rate, Sudden sweating or clammy skin, Shortness of breath at any time, Sudden discomfort in arms, back, neck or jaw   Nursing interventions  Nurse provided patient education   Is the pateint /caregiver able to teach back the importance of cardiac rehab?  Yes   Nursing interventions  Provided education on importance of cardiac rehab   Is the patient/caregiver able  to teach back lifestyle changes to help prevent MIs  Heart healthy diet, Regular exercise as approved by provider   Is the patient/caregiver able to teach back ways to prevent a second heart attack:  Follow up with MD, Participate in Cardiac Rehab, Take medications   Is the patient/caregiver able to teach back the hierarchy of who to call/visit for symptoms/problems? PCP, Specialist, Home health nurse, Urgent Care, ED, 911  Yes   Week 3 call completed?  Yes          Magan Rocha RN

## 2019-01-22 ENCOUNTER — DOCUMENTATION (OUTPATIENT)
Dept: CARDIOLOGY | Facility: CLINIC | Age: 69
End: 2019-01-22

## 2019-01-23 ENCOUNTER — READMISSION MANAGEMENT (OUTPATIENT)
Dept: CALL CENTER | Facility: HOSPITAL | Age: 69
End: 2019-01-23

## 2019-01-23 NOTE — OUTREACH NOTE
AMI Week 4 Survey      Responses   Facility patient discharged from?  [place]   Does the patient have one of the following disease processes/diagnoses(primary or secondary)?  Acute MI (STEMI,NSTEMI)   Week 4 attempt successful?  No          Eric Silva RN

## 2019-01-29 ENCOUNTER — CLINICAL SUPPORT (OUTPATIENT)
Dept: CARDIOLOGY | Facility: CLINIC | Age: 69
End: 2019-01-29

## 2019-01-29 DIAGNOSIS — I50.22 CHRONIC SYSTOLIC CONGESTIVE HEART FAILURE (HCC): ICD-10-CM

## 2019-01-29 PROCEDURE — 93296 REM INTERROG EVL PM/IDS: CPT | Performed by: PHYSICIAN ASSISTANT

## 2019-01-29 PROCEDURE — 93295 DEV INTERROG REMOTE 1/2/MLT: CPT | Performed by: PHYSICIAN ASSISTANT

## 2019-01-30 ENCOUNTER — OFFICE VISIT (OUTPATIENT)
Dept: CARDIOLOGY | Facility: CLINIC | Age: 69
End: 2019-01-30

## 2019-01-30 VITALS
SYSTOLIC BLOOD PRESSURE: 125 MMHG | WEIGHT: 225 LBS | RESPIRATION RATE: 18 BRPM | DIASTOLIC BLOOD PRESSURE: 70 MMHG | BODY MASS INDEX: 27.4 KG/M2 | HEIGHT: 76 IN | HEART RATE: 62 BPM

## 2019-01-30 DIAGNOSIS — I25.10 CORONARY ARTERY DISEASE INVOLVING NATIVE CORONARY ARTERY OF NATIVE HEART WITHOUT ANGINA PECTORIS: Primary | ICD-10-CM

## 2019-01-30 PROCEDURE — 93000 ELECTROCARDIOGRAM COMPLETE: CPT | Performed by: NURSE PRACTITIONER

## 2019-01-30 PROCEDURE — 99214 OFFICE O/P EST MOD 30 MIN: CPT | Performed by: NURSE PRACTITIONER

## 2019-01-30 NOTE — PROGRESS NOTES
"    Subjective:     Encounter Date:01/30/2019      Patient ID: Jadon Flynn is a 68 y.o. male.    Chief Complaint: follow up CAD and CHF   The patient reports he is feeling well overall. He admits fatigue.  He reports mild, stable, chronic dyspnea on exertion. He also reports he had a cold recently - his symptoms with this are now resolved. He reports chronic, mild orthopnea. He states he has slept with the head of his bed slightly elevated for 1-2 years. He denies chest pain, edema, weight gain, PND, palpitations, syncope or pre syncope. He admits he is bruising easily on the Brilinta, but denies any signs of major bleeding. He has an appt with Dr. Cantu for his NSVT coming up 2/7/2019. He also reports he is now following with pulmonology for COPD.         The following portions of the patient's history were reviewed and updated as appropriate: allergies, current medications, past family history, past medical history, past social history, past surgical history and problem list.  /70 (BP Location: Left arm, Patient Position: Sitting, Cuff Size: Adult)   Pulse 62   Resp 18   Ht 193 cm (76\")   Wt 102 kg (225 lb)   BMI 27.39 kg/m²   No Known Allergies    Current Outpatient Medications:   •  allopurinol (ZYLOPRIM) 100 MG tablet, Take 100 mg by mouth Daily., Disp: , Rfl:   •  amiodarone (PACERONE) 200 MG tablet, Take 1 tablet by mouth 2 (Two) Times a Day., Disp: 60 tablet, Rfl: 3  •  aspirin 81 MG EC tablet, Take 81 mg by mouth Daily., Disp: , Rfl:   •  atorvastatin (LIPITOR) 40 MG tablet, Take 1 tablet by mouth Daily., Disp: 90 tablet, Rfl: 3  •  carvedilol (COREG) 25 MG tablet, TAKE 1 TABLET TWICE A DAY WITH MEALS, Disp: 180 tablet, Rfl: 3  •  Fluticasone-Umeclidin-Vilant (TRELEGY ELLIPTA) 100-62.5-25 MCG/INH aerosol powder , Inhale 1 each Daily. (Patient taking differently: Inhale 1 each As Needed.), Disp: 1 each, Rfl: 0  •  furosemide (LASIX) 40 MG tablet, Take 1 tablet by mouth Daily., Disp: 90 " tablet, Rfl: 3  •  Glycopyrrolate-Formoterol (BEVESPI AEROSPHERE) 9-4.8 MCG/ACT aerosol, Inhale 2 sprays 2 (Two) Times a Day. (Patient taking differently: Inhale 2 puffs As Needed.), Disp: 1 inhaler, Rfl: 0  •  HYDROcodone-acetaminophen (NORCO) 7.5-325 MG per tablet, take 1 tablet by mouth three times a day if needed for pain -FILL DATE 11-, Disp: , Rfl: 0  •  lisinopril (PRINIVIL,ZESTRIL) 10 MG tablet, Take 1 tablet by mouth Daily., Disp: 90 tablet, Rfl: 3  •  ticagrelor (BRILINTA) 90 MG tablet tablet, Take 1 tablet by mouth 2 (Two) Times a Day., Disp: 180 tablet, Rfl: 3  Past Medical History:   Diagnosis Date   • Asymptomatic bilateral carotid artery stenosis    • CAD (coronary artery disease) 12/07/2016   • CAD in native artery      2009 stents   • Carotid artery disease (CMS/[facility])    • CHF (congestive heart failure) (CMS/[facility]) 12/07/2016   • Chronic combined systolic and diastolic CHF (congestive heart failure) (CMS/[facility])     echo 5/2013 EF 35-40%   • Chronic systolic CHF (congestive heart failure), NYHA class 2 (CMS/[facility])    • CKD (chronic kidney disease) 12/07/2016   • CKD (chronic kidney disease), stage III (CMS/[facility])    • COPD, group B, by GOLD 2017 classification (CMS/[facility]) 11/20/2018   • Essential hypertension    • Gout    • HLD (hyperlipidemia) 12/07/2016   • HTN (hypertension) 12/07/2016   • Hyperkalemia    • Hyperlipidemia, unspecified    • Ischemic heart disease 12/07/2016   • Ischemic heart disease    • NSTEMI (non-ST elevated myocardial infarction) (CMS/[facility]) 12/28/2018   • Peripheral vascular disease (CMS/[facility])    • Pinched nerve in neck    • PVD (peripheral vascular disease) (CMS/[facility]) 12/07/2016   • S/P implantation of automatic cardioverter/defibrillator (AICD) 12/07/2016   • S/P implantation of automatic cardioverter/defibrillator (AICD)    • Stroke (CMS/[facility])      Past Surgical History:   Procedure Laterality Date   • CARDIAC CATHETERIZATION N/A 12/28/2018    Procedure: Coronary angiography;   Surgeon: John Mckeon MD;  Location:  [facility];  Service: Cardiology   • CARDIAC DEFIBRILLATOR PLACEMENT     • CAROTID ENDARTERECTOMY     • CARPAL TUNNEL RELEASE Right    • CORONARY STENT PLACEMENT      x2   • FINGER SURGERY Right     tendon repair    • HYDROCELE EXCISION / REPAIR     • INSERT / REPLACE / REMOVE PACEMAKER     • TOTAL KNEE ARTHROPLASTY Left    • VARICOSE VEIN SURGERY Right 2018    Procedure: RIGHT SAPHENOUS VEIN RADIO FREQUENCY ABLATION;  Surgeon: Dave Chavarria DO;  Location: [facility] HYBRID OR 12;  Service: Vascular     Social History     Socioeconomic History   • Marital status:      Spouse name: Not on file   • Number of children: Not on file   • Years of education: Not on file   • Highest education level: Not on file   Social Needs   • Financial resource strain: Not on file   • Food insecurity - worry: Not on file   • Food insecurity - inability: Not on file   • Transportation needs - medical: Not on file   • Transportation needs - non-medical: Not on file   Occupational History   • Not on file   Tobacco Use   • Smoking status: Former Smoker     Last attempt to quit:      Years since quittin.0   • Smokeless tobacco: Never Used   Substance and Sexual Activity   • Alcohol use: No   • Drug use: No   • Sexual activity: Defer   Other Topics Concern   • Not on file   Social History Narrative   • Not on file     Family History   Problem Relation Age of Onset   • Cancer Father    • Heart disease Mother    • Diabetes Mother    • Sleep apnea Mother    • Hypertension Mother    • Coronary artery disease Other        Review of Systems   Constitution: Positive for malaise/fatigue. Negative for chills, diaphoresis, fever and weakness.   HENT: Negative for nosebleeds.    Eyes: Negative for visual disturbance.   Cardiovascular: Positive for dyspnea on exertion and orthopnea. Negative for chest pain, claudication, cyanosis, irregular heartbeat, leg swelling,  near-syncope, palpitations, paroxysmal nocturnal dyspnea and syncope.        Leg pain    Respiratory: Negative for cough, hemoptysis, shortness of breath, sputum production and wheezing.    Hematologic/Lymphatic: Negative for bleeding problem.   Skin: Negative for color change and flushing.        Bruising    Musculoskeletal: Negative for falls.   Gastrointestinal: Negative for bloating, abdominal pain, hematemesis, hematochezia, melena, nausea and vomiting.   Genitourinary: Negative for hematuria.   Neurological: Negative for dizziness and light-headedness.   Psychiatric/Behavioral: Negative for altered mental status.         ECG 12 Lead  Date/Time: 1/30/2019 11:50 AM  Performed by: Sisi Rutledge APRN  Authorized by: Sisi Rutledge APRN   Comparison: compared with previous ECG from 12/29/2018  Similar to previous ECG  Rhythm: sinus rhythm  Ectopy: infrequent PVCs  Conduction: left bundle branch block and 1st degree               Objective:     Physical Exam   Constitutional: He is oriented to person, place, and time. He appears well-developed and well-nourished. No distress.   HENT:   Head: Normocephalic and atraumatic.   Eyes: Pupils are equal, round, and reactive to light.   Neck: Normal range of motion. Neck supple. No JVD present. No thyromegaly present.   Cardiovascular: Normal rate, regular rhythm, normal heart sounds and intact distal pulses. Exam reveals no gallop and no friction rub.   No murmur heard.  Right DP and PT pulses dopplered    Pulmonary/Chest: Effort normal. No respiratory distress. He has no wheezes. He has rales (minimal faint rales bases ). He exhibits no tenderness.   Abdominal: Soft. Bowel sounds are normal. He exhibits no distension. There is no tenderness.   Musculoskeletal: Normal range of motion. He exhibits edema (trace BLE edema ).   Neurological: He is alert and oriented to person, place, and time. No cranial nerve deficit.   Skin: Skin is warm and dry. He is not diaphoretic.    Psychiatric: He has a normal mood and affect. His behavior is normal.     Echo Review:   12/2018 :  · Left ventricular systolic function is moderately decreased. Estimated ejection fraction is 31-35%.  · The left ventricular cavity is severely dilated.  · Left ventricular diastolic dysfunction (grade II) consistent with pseudonormalization.  · Right ventricular cavity is moderately dilated.  · There is bi-atrial enlargement.  · Moderate mitral valve regurgitation is present  · Mild to moderate tricuspid valve regurgitation is present.  · Mild pulmonic valve regurgitation is present.  · Estimated right ventricular systolic pressure from tricuspid regurgitation is mildly elevated (35-45 mmHg).      Assessment:          Diagnosis Plan   1. Coronary artery disease involving native coronary artery of native heart without angina pectoris  Stable. No angina     PCI with MELISA to proximal LAD 12/2018 for NSTEMI     PCI to LAD 2011    Of note, he has a history of Plavix resistance    2. Chronic systolic congestive heart failure    Stage C, Class II. Compensated.       3. S/P implantation of automatic cardioverter/defibrillator (AICD)  Single chamber.  He has been referred to see Dr. Cantu for NSVT - appt scheduled for 2/7/19  Reviewed interrogation 1/29/2019 with Ekaterina Rocha, RN today - no new events since interrogation 10/2018      4. Chronic kidney disease, unspecified CKD stage  Followed by Dr. Trevino      5. Venous insufficiency    Followed by Dr. Chavarria.      6. PVD (peripheral vascular disease)  Carotid disease followed by Dr. Chavarria    He is s/p right iliac stents x 2 per Dr. Mckeon following a dissection 12/2018      7. Essential hypertension  Controlled        8. Left bundle branch block - chronic    9. Non sustained ventricular tachycardia - continue amiodarone, keep upcoming appt with Dr. Cantu     10. COPD - followed by pulmonology     11. Hyperlipidemia -  - atorvastatin dose increased during  hospitalization for NSTEMI 1 month ago     Plan:       Continue ASA, Brilinta , statin, coreg, lasix, amiodarone, and lisinopril.  We discussed flexible lasix dosing - he may take 1 additional 40 mg tablet daily PRN for worsening dyspnea, orthopnea, edema, or sudden significant weight gain   Keep appt with Dr. Cantu   Follow up 2 months, sooner with new or worsening symptoms to concerns     Reviewed signs and symptoms of CHF and what to report with the patient. Patient instructed to restrict sodium and weigh daily. Report weight gain of greater than 2 lbs overnight or 5 lbs in 1 week. Pt verbalized understanding of instructions and plan of care.

## 2019-02-06 NOTE — PROGRESS NOTES
Single Chamber AICD Evaluation Report  [place]    February 5, 2019    Primary Cardiologist: Mike  : Guidant Model: Teligen  Implant date: 11/9/11    Reason for evaluation: routine  Indication for AICD: chronic systolic congestive heart failure    Measurements  Ventricular sensing - R wave: 2.4 mV  Ventricular threshold: n/r  Ventricular lead impedance: 353 ohms  Shock Impedance: RV 41 ohms        Diagnostic Data  Paced: 2 %  Other: no new episodes noted  Battery status: satisfactory     Final Parameters  Mode: VVI  Lower rate: 50 bpm   Ventricular - Amplitude: 3.5 V   Pulse width: 0.4 ms   Sensitivity: 0.6 mV   Changes made: n/a  Conclusions: normal AICD function    Follow up: 3 months

## 2019-02-12 NOTE — PROGRESS NOTES
OLI Dempsey  [facility]   Respiratory Disease Clinic  [address] [place] [address]  Phone: [phone]  Fax: [phone]     Jadon Flynn is a 68 y.o. male.   CC:   Chief Complaint   Patient presents with   • Shortness of Breath        HPI: Mr. Flynn coming in for a follow-up of his shortness of breath.  Last office visit he indicated that he was no longer having shortness of breath, fatigue or chest discomfort after the stents were placed.  He requested an as-needed basis follow-up.  Since that appointment he was seen by cardiology and reported some mild, stable, chronic shortness of breath with exertion.  He also reported orthopnea.  They indicated he has an appointment with Dr. Cantu for SVT.  It appears that he remains on amiodarone.  The patient indicates Dr. Cantu was reducing him to once daily on the amiodarone with plans to wean him off entirely.  The patient has returned for persistent coughing.  Worse in the morning.  He is attributing it to a lot of postnasal drainage.  He states this developed a couple of weeks after I saw him.  It has been accompanied by frequent sneezing and runny nose.  He indicates his wife was sick with a cold around the same time that his symptoms began.  He feels like once he wakes up in the morning and blows his nose and gets the majority of the discharge expelled that his breathing is better.    The following portions of the patient's history were reviewed and updated as appropriate: allergies, current medications, past family history, past medical history, past social history, past surgical history and problem list.    Past Medical History:   Diagnosis Date   • Asymptomatic bilateral carotid artery stenosis    • CAD (coronary artery disease) 12/07/2016   • CAD in native artery      2009 stents   • Carotid artery disease (CMS/[facility])    • CHF (congestive heart failure) (CMS/[facility]) 12/07/2016   • Chronic combined systolic and diastolic  CHF (congestive heart failure) (CMS/HCC)     echo 2013 EF 35-40%   • Chronic systolic CHF (congestive heart failure), NYHA class 2 (CMS/HCC)    • CKD (chronic kidney disease) 2016   • CKD (chronic kidney disease), stage III (CMS/HCC)    • COPD, group B, by GOLD 2017 classification (CMS/HCC) 2018   • Essential hypertension    • Gout    • HLD (hyperlipidemia) 2016   • HTN (hypertension) 2016   • Hyperkalemia    • Hyperlipidemia, unspecified    • Ischemic heart disease 2016   • Ischemic heart disease    • NSTEMI (non-ST elevated myocardial infarction) (CMS/HCC) 2018   • Peripheral vascular disease (CMS/HCC)    • Pinched nerve in neck    • PVD (peripheral vascular disease) (CMS/HCC) 2016   • S/P implantation of automatic cardioverter/defibrillator (AICD) 2016   • S/P implantation of automatic cardioverter/defibrillator (AICD)    • Stroke (CMS/HCC)        Family History   Problem Relation Age of Onset   • Cancer Father    • Heart disease Mother    • Diabetes Mother    • Sleep apnea Mother    • Hypertension Mother    • Coronary artery disease Other        Social History     Socioeconomic History   • Marital status:      Spouse name: Not on file   • Number of children: Not on file   • Years of education: Not on file   • Highest education level: Not on file   Social Needs   • Financial resource strain: Not on file   • Food insecurity - worry: Not on file   • Food insecurity - inability: Not on file   • Transportation needs - medical: Not on file   • Transportation needs - non-medical: Not on file   Occupational History   • Not on file   Tobacco Use   • Smoking status: Former Smoker     Last attempt to quit: 2010     Years since quittin.1   • Smokeless tobacco: Never Used   Substance and Sexual Activity   • Alcohol use: No   • Drug use: No   • Sexual activity: Defer   Other Topics Concern   • Not on file   Social History Narrative   • Not on file       Review of  Systems   Constitutional: Negative for activity change, chills, fatigue and fever.   HENT: Positive for congestion, postnasal drip and sneezing. Negative for rhinorrhea, sinus pressure, sore throat and trouble swallowing.         Blowing lots of mucous from his nose in the morning   Eyes: Negative for blurred vision, double vision and pain.   Respiratory: Positive for cough and shortness of breath. Negative for chest tightness and wheezing.    Cardiovascular: Negative for chest pain, palpitations and leg swelling.   Gastrointestinal: Negative for abdominal distention, constipation, diarrhea, nausea and vomiting.   Endocrine: Negative for polydipsia, polyphagia and polyuria.   Genitourinary: Negative for dysuria, frequency and urgency.   Musculoskeletal: Negative for arthralgias, back pain, gait problem and joint swelling.   Skin: Negative for color change, dry skin, rash and skin lesions.   Allergic/Immunologic: Negative for environmental allergies, food allergies and immunocompromised state.   Neurological: Negative for dizziness, seizures, speech difficulty, weakness, light-headedness, memory problem and confusion.   Hematological: Negative for adenopathy. Does not bruise/bleed easily.   Psychiatric/Behavioral: Positive for sleep disturbance. Negative for negative for hyperactivity and depressed mood. The patient is not nervous/anxious.        .vs    Physical Exam   Constitutional: He is oriented to person, place, and time. He appears well-developed and well-nourished. No distress.   HENT:   Head: Normocephalic and atraumatic.   Right Ear: External ear normal.   Left Ear: External ear normal.   Nose: Nose normal.   Mouth/Throat: Oropharynx is clear and moist. No oropharyngeal exudate.   Erythema and cobblestoning consistent with postnasal drainage   Eyes: Conjunctivae and EOM are normal. Pupils are equal, round, and reactive to light. Right eye exhibits no discharge. Left eye exhibits no discharge.   Neck: Normal  range of motion. Neck supple. No JVD present.   Cardiovascular: Normal rate and regular rhythm.   No murmur heard.  Pulmonary/Chest: Effort normal and breath sounds normal. No respiratory distress. He has no wheezes.   Abdominal: Soft. Bowel sounds are normal. He exhibits no distension. There is no tenderness.   Musculoskeletal: Normal range of motion. He exhibits no edema or deformity.   Neurological: He is alert and oriented to person, place, and time. He displays normal reflexes. No cranial nerve deficit. Coordination normal.   Skin: Skin is warm and dry. No rash noted. He is not diaphoretic. No erythema.   Psychiatric: He has a normal mood and affect. His behavior is normal. Thought content normal.   Nursing note and vitals reviewed.      Pulmonary Functions Testing Results:    No results found for: FEV1, FVC, PIZ7UTB, TLC, DLCO  No PFTs for this visit    CXR: No updated imaging for this visit        Problem List Items Addressed This Visit        Cardiovascular and Mediastinum    PVD (peripheral vascular disease) (CMS/[facility])    CAD (coronary artery disease)    S/P implantation of automatic cardioverter/defibrillator (AICD)    Ischemic heart disease    HTN (hypertension)    CHF (congestive heart failure) (CMS/[facility])    ICAO (internal carotid artery occlusion), right       Respiratory    COPD, group B, by GOLD 2017 classification (CMS/[facility])    Acute rhinitis - Primary       Genitourinary    CKD (chronic kidney disease)       Other    Postnasal discharge        Patient's Body mass index is 27.14 kg/m². BMI is above normal parameters. Recommendations include: educational material.    Primary symptoms seem to be secondary to postnasal discharge and rhinitis.  Her symptoms start over-the-counter Mucinex if sputum becomes too thick and difficult to manage.  Otherwise I provided him with a couple weeks worth of Dymista to trial.  I did instruct him on how to utilize it properly so it does not drain down the back of his  throat.  If this is helpful we can consider doing this intermittently versus continuously given the frequency of his symptoms.  If it is not helpful whatsoever and breathing becomes an issue we could consider going back on the inhaler however he does not want to do that at this point in time.  Arrange follow-up in a few weeks but can certainly see him or talk over the phone sooner if there is any other issues    Katie Parra, APRN  2/13/2019  1:42 PM    Return in about 4 weeks (around 3/13/2019).

## 2019-02-13 ENCOUNTER — OFFICE VISIT (OUTPATIENT)
Dept: PULMONOLOGY | Facility: CLINIC | Age: 69
End: 2019-02-13

## 2019-02-13 VITALS
SYSTOLIC BLOOD PRESSURE: 122 MMHG | OXYGEN SATURATION: 98 % | WEIGHT: 223 LBS | HEART RATE: 66 BPM | BODY MASS INDEX: 27.16 KG/M2 | HEIGHT: 76 IN | DIASTOLIC BLOOD PRESSURE: 64 MMHG

## 2019-02-13 DIAGNOSIS — N18.30 STAGE 3 CHRONIC KIDNEY DISEASE (HCC): ICD-10-CM

## 2019-02-13 DIAGNOSIS — I15.8 OTHER SECONDARY HYPERTENSION: ICD-10-CM

## 2019-02-13 DIAGNOSIS — I73.9 PVD (PERIPHERAL VASCULAR DISEASE) (HCC): ICD-10-CM

## 2019-02-13 DIAGNOSIS — I25.9 ISCHEMIC HEART DISEASE: ICD-10-CM

## 2019-02-13 DIAGNOSIS — I50.22 CHRONIC SYSTOLIC CONGESTIVE HEART FAILURE (HCC): ICD-10-CM

## 2019-02-13 DIAGNOSIS — R09.82 POSTNASAL DISCHARGE: ICD-10-CM

## 2019-02-13 DIAGNOSIS — I65.21 ICAO (INTERNAL CAROTID ARTERY OCCLUSION), RIGHT: ICD-10-CM

## 2019-02-13 DIAGNOSIS — Z95.810 S/P IMPLANTATION OF AUTOMATIC CARDIOVERTER/DEFIBRILLATOR (AICD): ICD-10-CM

## 2019-02-13 DIAGNOSIS — J44.9 COPD, GROUP B, BY GOLD 2017 CLASSIFICATION (HCC): ICD-10-CM

## 2019-02-13 DIAGNOSIS — J00 ACUTE RHINITIS: Primary | ICD-10-CM

## 2019-02-13 DIAGNOSIS — I25.10 CORONARY ARTERY DISEASE INVOLVING NATIVE CORONARY ARTERY OF NATIVE HEART WITHOUT ANGINA PECTORIS: ICD-10-CM

## 2019-02-13 PROCEDURE — 99214 OFFICE O/P EST MOD 30 MIN: CPT | Performed by: NURSE PRACTITIONER

## 2019-02-13 NOTE — PATIENT INSTRUCTIONS
DYMISTA ONE SPRAY TO EACH NARE DAILY. DO NOT SNIFF IT OR BLOW YOUR NOSE AFTER FOR A BIT.       BMI for Adults  Body mass index (BMI) is a number that is calculated from a person's weight and height. In most adults, the number is used to find how much of an adult's weight is made up of fat. BMI is not as accurate as a direct measure of body fat.  How is BMI calculated?  BMI is calculated by dividing weight in kilograms by height in meters squared. It can also be calculated by dividing weight in pounds by height in inches squared, then multiplying the resulting number by 703. Charts are available to help you find your BMI quickly and easily without doing this calculation.  How is BMI interpreted?  Health care professionals use BMI charts to identify whether an adult is underweight, at a normal weight, or overweight based on the following guidelines:  · Underweight: BMI less than 18.5.  · Normal weight: BMI between 18.5 and 24.9.  · Overweight: BMI between 25 and 29.9.  · Obese: BMI of 30 and above.    BMI is usually interpreted the same for males and females.  Weight includes both fat and muscle, so someone with a muscular build, such as an athlete, may have a BMI that is higher than 24.9. In cases like these, BMI may not accurately depict body fat. To determine if excess body fat is the cause of a BMI of 25 or higher, further assessments may need to be done by a health care provider.  Why is BMI a useful tool?  BMI is used to identify a possible weight problem that may be related to a medical problem or may increase the risk for medical problems. BMI can also be used to promote changes to reach a healthy weight.  This information is not intended to replace advice given to you by your health care provider. Make sure you discuss any questions you have with your health care provider.  Document Released: 08/29/2005 Document Revised: 04/27/2017 Document Reviewed: 05/15/2015  Elsevier Interactive Patient Education © 2018  [employer] Inc[employer]

## 2019-03-04 DIAGNOSIS — R09.82 POSTNASAL DISCHARGE: Primary | ICD-10-CM

## 2019-03-04 RX ORDER — AZELASTINE HYDROCHLORIDE, FLUTICASONE PROPIONATE 137; 50 UG/1; UG/1
1 SPRAY, METERED NASAL DAILY
Qty: 1 BOTTLE | Refills: 11 | Status: SHIPPED | OUTPATIENT
Start: 2019-03-04 | End: 2019-03-07

## 2019-03-04 RX ORDER — AZELASTINE 1 MG/ML
2 SPRAY, METERED NASAL DAILY
Qty: 1 EACH | Refills: 11 | Status: SHIPPED | OUTPATIENT
Start: 2019-03-04 | End: 2020-01-01

## 2019-03-04 NOTE — TELEPHONE ENCOUNTER
Dymista not covered on insurance. The alternatives are Flonase or Astelin.   Per Katie we are going to try the astelin 2 squirts each nostril once a day.    Sent in Astelin.

## 2019-03-06 ENCOUNTER — TELEPHONE (OUTPATIENT)
Dept: VASCULAR SURGERY | Facility: CLINIC | Age: 69
End: 2019-03-06

## 2019-03-06 NOTE — TELEPHONE ENCOUNTER
Spoke with Mr Flynn reminding him of his appointments for Thursday, March 7th, 2019. Reminded Mr Flynn to arrive at the [facility] at 730 am for testing and follow up afterwards at 9 am with Dr Chavarria.  Floyddianaritesh confirmed he would be here.

## 2019-03-07 ENCOUNTER — HOSPITAL ENCOUNTER (OUTPATIENT)
Dept: ULTRASOUND IMAGING | Facility: HOSPITAL | Age: 69
Discharge: HOME | End: 2019-03-07
Admitting: NURSE PRACTITIONER

## 2019-03-07 ENCOUNTER — OFFICE VISIT (OUTPATIENT)
Dept: VASCULAR SURGERY | Facility: CLINIC | Age: 69
End: 2019-03-07

## 2019-03-07 VITALS
DIASTOLIC BLOOD PRESSURE: 64 MMHG | HEART RATE: 69 BPM | SYSTOLIC BLOOD PRESSURE: 130 MMHG | BODY MASS INDEX: 27.16 KG/M2 | OXYGEN SATURATION: 99 % | WEIGHT: 223 LBS | HEIGHT: 76 IN

## 2019-03-07 DIAGNOSIS — I87.2 VENOUS INSUFFICIENCY: ICD-10-CM

## 2019-03-07 DIAGNOSIS — I10 ESSENTIAL HYPERTENSION: ICD-10-CM

## 2019-03-07 DIAGNOSIS — I65.23 BILATERAL CAROTID ARTERY STENOSIS: ICD-10-CM

## 2019-03-07 DIAGNOSIS — I73.9 PVD (PERIPHERAL VASCULAR DISEASE) (HCC): ICD-10-CM

## 2019-03-07 DIAGNOSIS — E78.2 MIXED HYPERLIPIDEMIA: ICD-10-CM

## 2019-03-07 DIAGNOSIS — I65.23 BILATERAL CAROTID ARTERY STENOSIS: Primary | ICD-10-CM

## 2019-03-07 PROCEDURE — 93880 EXTRACRANIAL BILAT STUDY: CPT

## 2019-03-07 PROCEDURE — 93880 EXTRACRANIAL BILAT STUDY: CPT | Performed by: SURGERY

## 2019-03-07 PROCEDURE — 99214 OFFICE O/P EST MOD 30 MIN: CPT | Performed by: SURGERY

## 2019-03-07 NOTE — PROGRESS NOTES
"3/7/2019       Alli Perry MD  [address] [facility] [address]        Jadon Flynn  1950    Chief Complaint   Patient presents with   • Follow-up     1 yr f/u with carotid testing.  Pt denies any stroke like symptoms.       Dear Alli Perry MD:    HPI     I had the pleasure of seeing you patient in the office today for follow up.  As you recall, the patient is a 68 y.o. male who we are currently following for carotid occlusive disease.  He is status post left carotid endarterectomy and has a known right carotid occlusion.  Currently, he appears to be doing quite well and has no specific complaints.  He remains asymptomatic from a strokelike standpoint.  He also did undergo right lower extremity radiofrequency ablation of the greater saphenous vein for leg swelling and symptomatic venous disease.  He states the twitching and pain is relieved.  He also recently underwent cardiac intervention as well as iliac artery intervention by Dr. Mckeon.  He had noninvasive testing performed today in which I personally reviewed.      Review of Systems   Constitutional: Negative.    HENT: Negative.    Eyes: Negative.    Respiratory: Negative.    Cardiovascular: Negative.    Gastrointestinal: Negative.    Endocrine: Negative.    Genitourinary: Negative.    Musculoskeletal: Negative.    Skin: Negative.    Allergic/Immunologic: Negative.    Neurological: Negative.    Hematological: Negative.    Psychiatric/Behavioral: Negative.    All other systems reviewed and are negative.        /64   Pulse 69   Ht 193 cm (76\")   Wt 101 kg (223 lb)   SpO2 99%   BMI 27.14 kg/m²   Physical Exam   Constitutional: He is oriented to person, place, and time. He appears well-developed and well-nourished.   HENT:   Head: Normocephalic and atraumatic.   Neck: Neck supple. No JVD present. Carotid bruit is not present. No thyromegaly present.   Cardiovascular: Normal rate, regular rhythm and normal heart sounds.   Pulses:     "   Carotid pulses are 2+ on the right side, and 2+ on the left side.       Femoral pulses are 2+ on the right side, and 2+ on the left side.       Popliteal pulses are 2+ on the right side, and 2+ on the left side.        Dorsalis pedis pulses are 2+ on the right side, and 2+ on the left side.        Posterior tibial pulses are 2+ on the right side, and 2+ on the left side.   Pulmonary/Chest: Effort normal and breath sounds normal.   Abdominal: Soft. Bowel sounds are normal. He exhibits no distension, no abdominal bruit and no mass. There is no hepatosplenomegaly. There is no tenderness.   Musculoskeletal: Normal range of motion. He exhibits no edema.   Neurological: He is alert and oriented to person, place, and time. He has normal strength. No cranial nerve deficit or sensory deficit.   Skin: Skin is warm and intact.   Nursing note and vitals reviewed.      DIAGNOSTIC DATA: Noninvasive testing including a carotid duplex shows right know carotid occlusion and left carotid with 50-69% stenosis with bilateral antegrade vertebral flow.    Patient Active Problem List   Diagnosis   • PVD (peripheral vascular disease) (CMS/[facility])   • CAD (coronary artery disease)   • S/P implantation of automatic cardioverter/defibrillator (AICD)   • HLD (hyperlipidemia)   • Ischemic heart disease   • HTN (hypertension)   • CHF (congestive heart failure) (CMS/[facility])   • CKD (chronic kidney disease)   • ICAO (internal carotid artery occlusion), right   • Venous insufficiency   • Ventricular tachycardia (CMS/[facility])   • Abnormal PFT   • COPD, group B, by GOLD 2017 classification (CMS/[facility])   • NSTEMI (non-ST elevated myocardial infarction) (CMS/[facility])   • Acute rhinitis   • Postnasal discharge         ICD-10-CM ICD-9-CM   1. Bilateral carotid artery stenosis I65.23 433.10     433.30   2. PVD (peripheral vascular disease) (CMS/[facility]) I73.9 443.9   3. Mixed hyperlipidemia E78.2 272.2   4. Essential hypertension I10 401.9   5. Venous insufficiency I87.2  459.81       PLAN: After thoroughly evaluating Jadon Flynn, I believe the best course of action is to remain conservative from a vascular surgery standpoint.  Currently his carotid duplex is unchanged from previous testing.  The left side appears to be widely patent.  His lower extremities are also stable with regards to arterial claudication.  His venous disease is also not causing him any trouble at this time.  He can continue to wear compression stockings in the 20-30 mm pressure gradient range.  We will see him back in 1 years time with a repeat carotid duplex and an MARISSA for surveillance.  I did discuss vascular risk factors as they pertain to the progression of vascular disease including controlling his hypertension and hyperlipidemia.  Body mass index is 27.14 kg/m². Follow up with PCP regarding plan including diet and exercise.  The patient is to continue taking their medications as previously discussed.   This was all discussed in full with complete understanding.  Thank you for allowing me to participate in the care of your patient.  Please do not hesitate to call with any questions or concerns.  We will keep you aware of any further encounters with Jadon Flynn.      Sincerely Yours,        Dave Chavarria, DO

## 2019-03-12 PROBLEM — R05.3 CHRONIC COUGH: Status: ACTIVE | Noted: 2019-03-12

## 2019-03-12 NOTE — PROGRESS NOTES
OLI Dempsey  [facility]   Respiratory Disease Clinic  [address] [place] [address]  Phone: [phone]  Fax: [phone]     Jadon Flynn is a 68 y.o. male.   CC:   Chief Complaint   Patient presents with   • Shortness of Breath        HPI:Mr. Flynn is coming in today for a follow-up of persistent coughing.  He related it to be worse in the morning and associated with postnasal drainage.  He denied shortness of breath.  He had try the inhalers but did not feel that those were helpful.  He was provided with samples of Dymista.  He contacted the office and indicated that was very helpful and he would like a prescription.  Unfortunately his insurance will not cover this medication.  He was prescribed Astelin.  He just started that yesterday.  His only complaint continues to be early a.m. issues with congestion and drainage.  Better once he gets up and blows his nose and ears his throat.  He would not like a daily maintenance inhaler but is interested in having a rescue inhaler to use when needed.    The following portions of the patient's history were reviewed and updated as appropriate: allergies, current medications, past family history, past medical history, past social history, past surgical history and problem list.    Past Medical History:   Diagnosis Date   • Asymptomatic bilateral carotid artery stenosis    • CAD (coronary artery disease) 12/07/2016   • CAD in native artery      2009 stents   • Carotid artery disease (CMS/[facility])    • CHF (congestive heart failure) (CMS/[facility]) 12/07/2016   • Chronic combined systolic and diastolic CHF (congestive heart failure) (CMS/[facility])     echo 5/2013 EF 35-40%   • Chronic systolic CHF (congestive heart failure), NYHA class 2 (CMS/[facility])    • CKD (chronic kidney disease) 12/07/2016   • CKD (chronic kidney disease), stage III (CMS/[facility])    • COPD, group B, by GOLD 2017 classification (CMS/[facility]) 11/20/2018   • Essential hypertension    •  Gout    • HLD (hyperlipidemia) 2016   • HTN (hypertension) 2016   • Hyperkalemia    • Hyperlipidemia, unspecified    • Ischemic heart disease 2016   • Ischemic heart disease    • NSTEMI (non-ST elevated myocardial infarction) (CMS/[facility]) 2018   • Peripheral vascular disease (CMS/HCC)    • Pinched nerve in neck    • PVD (peripheral vascular disease) (CMS/HCC) 2016   • S/P implantation of automatic cardioverter/defibrillator (AICD) 2016   • S/P implantation of automatic cardioverter/defibrillator (AICD)    • Stroke (CMS/HCC)        Family History   Problem Relation Age of Onset   • Cancer Father    • Heart disease Mother    • Diabetes Mother    • Sleep apnea Mother    • Hypertension Mother    • Coronary artery disease Other        Social History     Socioeconomic History   • Marital status:      Spouse name: Not on file   • Number of children: Not on file   • Years of education: Not on file   • Highest education level: Not on file   Social Needs   • Financial resource strain: Not on file   • Food insecurity - worry: Not on file   • Food insecurity - inability: Not on file   • Transportation needs - medical: Not on file   • Transportation needs - non-medical: Not on file   Occupational History   • Not on file   Tobacco Use   • Smoking status: Former Smoker     Packs/day: 1.00     Years: 35.00     Pack years: 35.00     Types: Cigarettes     Last attempt to quit: 2010     Years since quittin.2   • Smokeless tobacco: Never Used   Substance and Sexual Activity   • Alcohol use: No   • Drug use: No   • Sexual activity: Defer   Other Topics Concern   • Not on file   Social History Narrative   • Not on file       Review of Systems   Constitutional: Negative for activity change, chills, fatigue and fever.   HENT: Positive for postnasal drip. Negative for congestion, rhinorrhea, sinus pressure, sore throat and trouble swallowing.    Eyes: Negative for blurred vision, double vision and  pain.   Respiratory: Positive for cough and shortness of breath. Negative for chest tightness and wheezing.    Cardiovascular: Negative for chest pain, palpitations and leg swelling.   Gastrointestinal: Negative for abdominal distention, constipation, diarrhea, nausea and vomiting.   Endocrine: Negative for polydipsia, polyphagia and polyuria.   Genitourinary: Negative for dysuria, frequency and urgency.   Musculoskeletal: Negative for arthralgias, back pain, gait problem and joint swelling.   Skin: Negative for color change, dry skin, rash and skin lesions.   Allergic/Immunologic: Negative for environmental allergies, food allergies and immunocompromised state.   Neurological: Negative for dizziness, seizures, speech difficulty, weakness, light-headedness, memory problem and confusion.   Hematological: Negative for adenopathy. Does not bruise/bleed easily.   Psychiatric/Behavioral: Negative for sleep disturbance, negative for hyperactivity and depressed mood. The patient is not nervous/anxious.        .vs    Physical Exam   Constitutional: He is oriented to person, place, and time. He appears well-developed and well-nourished. No distress.   HENT:   Head: Normocephalic and atraumatic.   Right Ear: External ear normal.   Left Ear: External ear normal.   Nose: Nose normal.   Mouth/Throat: Oropharynx is clear and moist. No oropharyngeal exudate.   Eyes: Conjunctivae and EOM are normal. Pupils are equal, round, and reactive to light. Right eye exhibits no discharge. Left eye exhibits no discharge.   Neck: Normal range of motion. Neck supple. No JVD present.   Cardiovascular: Normal rate and regular rhythm.   No murmur heard.  Pulmonary/Chest: Effort normal and breath sounds normal. No respiratory distress. He has no wheezes.   Abdominal: Soft. Bowel sounds are normal. He exhibits no distension. There is no tenderness.   Musculoskeletal: Normal range of motion. He exhibits no edema or deformity.   Neurological: He is  alert and oriented to person, place, and time. He displays normal reflexes. No cranial nerve deficit. Coordination normal.   Skin: Skin is warm and dry. No rash noted. He is not diaphoretic. No erythema.   Psychiatric: He has a normal mood and affect. His behavior is normal. Thought content normal.   Nursing note and vitals reviewed.      Pulmonary Functions Testing Results:    No results found for: FEV1, FVC, RVZ5PTF, TLC, DLCO  No PFTs for this visit    CXR: Imaging for this visit        Problem List Items Addressed This Visit        Cardiovascular and Mediastinum    Ischemic heart disease       Respiratory    COPD, group B, by GOLD 2017 classification (CMS/[facility])    Relevant Medications    albuterol sulfate  (90 Base) MCG/ACT inhaler    Acute rhinitis    Chronic cough - Primary       Genitourinary    CKD (chronic kidney disease)       Other    Postnasal discharge        Patient's Body mass index is 27.02 kg/m². BMI is above normal parameters. Recommendations include: educational material.    Continue Astelin.  Add over-the-counter Flonase if symptoms are not as controlled on the Astelin as they were on the Dymista.  Prescription for rescue inhaler.  He was also instructed on how to utilize it and when to utilize it.  He was given a Pneumovax booster.  Follow-up in 6 months with spirometry or sooner if he has any other issues.    Katie Parra, APRJOHNIE  3/13/2019  10:38 AM    Return in about 6 months (around 9/13/2019) for FVL.

## 2019-03-13 ENCOUNTER — OFFICE VISIT (OUTPATIENT)
Dept: PULMONOLOGY | Facility: CLINIC | Age: 69
End: 2019-03-13

## 2019-03-13 VITALS
DIASTOLIC BLOOD PRESSURE: 68 MMHG | HEART RATE: 61 BPM | SYSTOLIC BLOOD PRESSURE: 118 MMHG | HEIGHT: 76 IN | WEIGHT: 222 LBS | OXYGEN SATURATION: 99 % | BODY MASS INDEX: 27.03 KG/M2

## 2019-03-13 DIAGNOSIS — R05.3 CHRONIC COUGH: Primary | ICD-10-CM

## 2019-03-13 DIAGNOSIS — R09.82 POSTNASAL DISCHARGE: ICD-10-CM

## 2019-03-13 DIAGNOSIS — J44.9 COPD, GROUP B, BY GOLD 2017 CLASSIFICATION (HCC): ICD-10-CM

## 2019-03-13 DIAGNOSIS — I25.9 ISCHEMIC HEART DISEASE: ICD-10-CM

## 2019-03-13 DIAGNOSIS — Z23 ENCOUNTER FOR IMMUNIZATION: ICD-10-CM

## 2019-03-13 DIAGNOSIS — J00 ACUTE RHINITIS: ICD-10-CM

## 2019-03-13 DIAGNOSIS — N18.30 STAGE 3 CHRONIC KIDNEY DISEASE (HCC): ICD-10-CM

## 2019-03-13 PROCEDURE — G0009 ADMIN PNEUMOCOCCAL VACCINE: HCPCS | Performed by: NURSE PRACTITIONER

## 2019-03-13 PROCEDURE — 90732 PPSV23 VACC 2 YRS+ SUBQ/IM: CPT | Performed by: NURSE PRACTITIONER

## 2019-03-13 PROCEDURE — 94664 DEMO&/EVAL PT USE INHALER: CPT | Performed by: NURSE PRACTITIONER

## 2019-03-13 PROCEDURE — 99214 OFFICE O/P EST MOD 30 MIN: CPT | Performed by: NURSE PRACTITIONER

## 2019-03-13 RX ORDER — ALBUTEROL SULFATE 90 UG/1
2 AEROSOL, METERED RESPIRATORY (INHALATION) EVERY 4 HOURS PRN
Qty: 1 INHALER | Refills: 1 | Status: SHIPPED | OUTPATIENT
Start: 2019-03-13 | End: 2019-04-12

## 2019-03-13 NOTE — PATIENT INSTRUCTIONS

## 2019-04-03 ENCOUNTER — DOCUMENTATION (OUTPATIENT)
Dept: CARDIOLOGY | Facility: CLINIC | Age: 69
End: 2019-04-03

## 2019-04-03 DIAGNOSIS — Z95.810 S/P IMPLANTATION OF AUTOMATIC CARDIOVERTER/DEFIBRILLATOR (AICD): Primary | ICD-10-CM

## 2019-04-03 NOTE — PROGRESS NOTES
Single Chamber AICD Evaluation Report  REMOTE/LATITUDE    April 3, 2019    Primary Cardiologist: Mike  : Guidant Model: Teligen 100 E102  Implant date: 11/9/2011    Reason for evaluation: remote transmission with ATP treated VT  Indication for AICD: ischemic cardiomyopathy    Measurements  Ventricular sensing - R wave: 4.3 mV  Ventricular threshold: N/P  Ventricular lead impedance: 360 ohms  Shock Impedance: RV 51 ohms      Diagnostic Data  Paced: 2 %    Episodes recorded since 3/23/2019:  4 NS-VT episodes, durations 1-6 seconds, rates 162-185 bpm  3 VT episodes on 4/2-4/3 (2 untreated, 1 treated):  Rates 190-199 bpm, longest duration 7 seconds treated successfully with one sequence of ATP.    Battery status: satisfactory, estimated 5.5 years remaining     Final Parameters  Mode: VVI  Lower rate: 50 bpm   Ventricular - Amplitude: 5 V   Pulse width: 0.4 ms   Sensitivity: 0.6 mV   Changes made: No changes made.  Conclusions: normal AICD function, stable sensing thresholds and ATP treated VT    Follow up: Every 3 months via latitude, annually in office (4/30/2019)

## 2019-04-29 ENCOUNTER — DOCUMENTATION (OUTPATIENT)
Dept: CARDIOLOGY | Facility: CLINIC | Age: 69
End: 2019-04-29

## 2019-04-29 DIAGNOSIS — I47.20 VENTRICULAR TACHYCARDIA (HCC): ICD-10-CM

## 2019-04-29 DIAGNOSIS — Z95.810 S/P IMPLANTATION OF AUTOMATIC CARDIOVERTER/DEFIBRILLATOR (AICD): Primary | ICD-10-CM

## 2019-04-29 NOTE — PROGRESS NOTES
Single Chamber AICD Evaluation Report  REMOTE/LATITUDE    April 29, 2019    Primary Cardiologist: Mike  : Guidant Model: Teligen 100 E102  Implant date: 11/9/2011    Reason for evaluation: remote transmission with ATP treated VT  Indication for AICD: ischemic cardiomyopathy    Measurements  Ventricular sensing - R wave: 2.6 mV  Ventricular threshold: N/P  Ventricular lead impedance: 335 ohms  Shock Impedance: RV 40 ohms      Diagnostic Data  Paced: 1 %    Episodes recorded since 4/3/2019:  6 NS-VT episodes, rates 163-185 bpm, durations 1-10 seconds.  4/26/2019:  1 treated VT episode at 206 bpm, duration 11 seconds.  1 sequence of ATP seemed to briefly accelerate rhythm, then rhythm resolved without further intervention.     Battery status: satisfactory, estimated 5.5 years remaining     Final Parameters  Mode: VVI  Lower rate: 50 bpm   Ventricular - Amplitude: 5 V   Pulse width: 0.4 ms   Sensitivity: 0.6 mV   Changes made: No changes made.  Conclusions: normal AICD function, adequate battery reserve and ATP treated VT    Follow up: In office on 5/7/2019

## 2019-06-06 DIAGNOSIS — J44.9 COPD, GROUP B, BY GOLD 2017 CLASSIFICATION (HCC): Primary | ICD-10-CM

## 2019-06-06 RX ORDER — ALBUTEROL SULFATE 90 UG/1
2 AEROSOL, METERED RESPIRATORY (INHALATION) EVERY 4 HOURS PRN
Qty: 54 G | Refills: 3 | Status: SHIPPED | OUTPATIENT
Start: 2019-06-06 | End: 2020-01-01

## 2019-06-12 RX ORDER — CARVEDILOL 25 MG/1
TABLET ORAL
Qty: 180 TABLET | Refills: 3 | Status: ON HOLD | OUTPATIENT
Start: 2019-06-12 | End: 2020-01-01

## 2019-07-10 ENCOUNTER — CLINICAL SUPPORT NO REQUIREMENTS (OUTPATIENT)
Dept: CARDIOLOGY | Facility: CLINIC | Age: 69
End: 2019-07-10

## 2019-07-10 ENCOUNTER — TELEPHONE (OUTPATIENT)
Dept: CARDIOLOGY | Facility: CLINIC | Age: 69
End: 2019-07-10

## 2019-07-10 ENCOUNTER — OFFICE VISIT (OUTPATIENT)
Dept: CARDIOLOGY | Facility: CLINIC | Age: 69
End: 2019-07-10

## 2019-07-10 VITALS
DIASTOLIC BLOOD PRESSURE: 58 MMHG | WEIGHT: 218 LBS | HEIGHT: 76 IN | BODY MASS INDEX: 26.55 KG/M2 | SYSTOLIC BLOOD PRESSURE: 126 MMHG | HEART RATE: 78 BPM

## 2019-07-10 DIAGNOSIS — Z95.810 S/P IMPLANTATION OF AUTOMATIC CARDIOVERTER/DEFIBRILLATOR (AICD): Primary | ICD-10-CM

## 2019-07-10 DIAGNOSIS — I25.5 ISCHEMIC CARDIOMYOPATHY: ICD-10-CM

## 2019-07-10 DIAGNOSIS — Z95.810 S/P IMPLANTATION OF AUTOMATIC CARDIOVERTER/DEFIBRILLATOR (AICD): ICD-10-CM

## 2019-07-10 DIAGNOSIS — I47.20 VENTRICULAR TACHYCARDIA (HCC): ICD-10-CM

## 2019-07-10 DIAGNOSIS — I50.42 CHRONIC COMBINED SYSTOLIC (CONGESTIVE) AND DIASTOLIC (CONGESTIVE) HEART FAILURE (HCC): ICD-10-CM

## 2019-07-10 DIAGNOSIS — I25.10 CORONARY ARTERY DISEASE INVOLVING NATIVE CORONARY ARTERY OF NATIVE HEART WITHOUT ANGINA PECTORIS: Primary | ICD-10-CM

## 2019-07-10 DIAGNOSIS — N18.30 STAGE 3 CHRONIC KIDNEY DISEASE (HCC): ICD-10-CM

## 2019-07-10 DIAGNOSIS — I73.9 PVD (PERIPHERAL VASCULAR DISEASE) (HCC): ICD-10-CM

## 2019-07-10 DIAGNOSIS — I10 ESSENTIAL HYPERTENSION: ICD-10-CM

## 2019-07-10 PROCEDURE — 93289 INTERROG DEVICE EVAL HEART: CPT | Performed by: INTERNAL MEDICINE

## 2019-07-10 PROCEDURE — 93000 ELECTROCARDIOGRAM COMPLETE: CPT | Performed by: NURSE PRACTITIONER

## 2019-07-10 PROCEDURE — 99214 OFFICE O/P EST MOD 30 MIN: CPT | Performed by: NURSE PRACTITIONER

## 2019-07-10 RX ORDER — PANTOPRAZOLE SODIUM 40 MG/1
40 TABLET, DELAYED RELEASE ORAL 2 TIMES DAILY
Refills: 0 | COMMUNITY
Start: 2019-06-30 | End: 2019-07-10

## 2019-07-10 RX ORDER — FAMOTIDINE 20 MG/1
20 TABLET, FILM COATED ORAL 2 TIMES DAILY
COMMUNITY
End: 2019-10-01 | Stop reason: ALTCHOICE

## 2019-07-10 NOTE — PROGRESS NOTES
Single Chamber AICD Evaluation Report  IN OFFICE INTERROGATION    July 10, 2019    Primary Cardiologist: Mike  : Guidant Model: Teligen 100 E102  Implant date: 11/9/2011    Reason for evaluation: routine  Indication for AICD: ischemic cardiomyopathy    Measurements  Ventricular sensing - R wave: 2.9 mV  Ventricular threshold: 2.8 V @ 0.4 ms (chronic, stable)  Ventricular lead impedance: 351 ohms  Shock Impedance: RV 48 ohms      Diagnostic Data  Paced: 1 %    Episodes recorded since 4/29/2019:  13 NS-VT episodes, rates range from 160-201 bpm, longest duration 18 seconds.  No ATP or shocks.    Battery status: satisfactory, estimated 5.5 years remaining     Final Parameters  Mode: VVI  Lower rate: 50 bpm   Ventricular - Amplitude: 5 V   Pulse width: 0.4 ms   Sensitivity: 0.6 mV   Changes made: No changes made.  Conclusions: normal AICD function, no therapy delivered, stable pacing and sensing thresholds, elevated ventricular pacing threshold and adequate battery reserve    Follow up: Every 3 months via latitude, annually in office

## 2019-07-10 NOTE — PROGRESS NOTES
Subjective:     Encounter Date:07/10/2019      Patient ID: Jadon Flynn is a 68 y.o. male with a history of coronary artery disease, ischemic cardiomyopathy, s/p AICD, VT, hyperlipidemia, hypertension, chronic systolic heart failure, chronic kidney disease and PVD.    Chief Complaint:  Congestive Heart Failure   Presents for follow-up visit. Associated symptoms include edema. Pertinent negatives include no chest pain, chest pressure, fatigue, near-syncope, orthopnea, palpitations, shortness of breath or unexpected weight change. His past medical history is significant for CAD.   Coronary Artery Disease   Presents for follow-up visit. Symptoms include leg swelling. Pertinent negatives include no chest pain, chest pressure, chest tightness, dizziness, palpitations, shortness of breath or weight gain. His past medical history is significant for CHF.   Hypertension   This is a chronic problem. The current episode started more than 1 year ago. The problem is controlled. Pertinent negatives include no chest pain, malaise/fatigue, orthopnea, palpitations, PND or shortness of breath.      Patient presents today for a follow up. He states he was recently in the hospital at [place] for a GI bleed and was told to stop his aspirin. He has been off his aspirin for about 2 weeks. They placed 2 clips on a bleeding ulcer. Patient denies chest pain. Latest AICD check shows 13 NS-VT episodes with rates 160-201 lasting 1-18 seconds. 6 of these episodes were while he was in the hospital. He states he was asymptomatic at these times.He does admit that he has never been on amiodarone that he is aware of. He complains of swelling in his ankles since being discharged. His PCP increased his lasix to 60mg daily until the swelling goes down. He states it has improved but they are still swollen. He goes to see Nephrology in 2 weeks with lab work in 1 week. He denies shortness of breath, orthopnea, and PND.     The following portions  of the patient's history were reviewed and updated as appropriate: allergies, current medications, past family history, past medical history, past social history, past surgical history and problem list.    No Known Allergies    Current Outpatient Medications:   •  albuterol sulfate  (90 Base) MCG/ACT inhaler, Inhale 2 puffs Every 4 (Four) Hours As Needed for Wheezing., Disp: 54 g, Rfl: 3  •  allopurinol (ZYLOPRIM) 100 MG tablet, Take 200 mg by mouth 2 (Two) Times a Day., Disp: , Rfl:   •  atorvastatin (LIPITOR) 40 MG tablet, Take 1 tablet by mouth Daily., Disp: 90 tablet, Rfl: 3  •  azelastine (ASTELIN) 0.1 % nasal spray, 2 sprays into the nostril(s) as directed by provider Daily. Use in each nostril as directed, Disp: 1 each, Rfl: 11  •  carvedilol (COREG) 25 MG tablet, TAKE 1 TABLET TWICE A DAY WITH MEALS, Disp: 180 tablet, Rfl: 3  •  famotidine (PEPCID) 20 MG tablet, Take 20 mg by mouth 2 (Two) Times a Day., Disp: , Rfl:   •  furosemide (LASIX) 40 MG tablet, Take 1 tablet by mouth Daily. (Patient taking differently: Take 60 mg by mouth Daily.), Disp: 90 tablet, Rfl: 3  •  HYDROcodone-acetaminophen (NORCO) 7.5-325 MG per tablet, take 1 tablet by mouth three times a day if needed for pain -FILL DATE 11-, Disp: , Rfl: 0  •  lisinopril (PRINIVIL,ZESTRIL) 10 MG tablet, Take 1 tablet by mouth Daily., Disp: 90 tablet, Rfl: 3  •  ticagrelor (BRILINTA) 90 MG tablet tablet, Take 1 tablet by mouth 2 (Two) Times a Day., Disp: 180 tablet, Rfl: 3  Past Medical History:   Diagnosis Date   • Asymptomatic bilateral carotid artery stenosis    • CAD (coronary artery disease) 12/07/2016   • CAD in native artery      2009 stents   • Carotid artery disease (CMS/[facility])    • CHF (congestive heart failure) (CMS/[facility]) 12/07/2016   • Chronic combined systolic and diastolic CHF (congestive heart failure) (CMS/[facility])     echo 5/2013 EF 35-40%   • Chronic systolic CHF (congestive heart failure), NYHA class 2 (CMS/HCC)    • CKD  (chronic kidney disease) 12/07/2016   • CKD (chronic kidney disease), stage III (CMS/HCC)    • COPD, group B, by GOLD 2017 classification (CMS/HCC) 11/20/2018   • Essential hypertension    • Gout    • HLD (hyperlipidemia) 12/07/2016   • HTN (hypertension) 12/07/2016   • Hyperkalemia    • Hyperlipidemia, unspecified    • Ischemic heart disease 12/07/2016   • Ischemic heart disease    • NSTEMI (non-ST elevated myocardial infarction) (CMS/HCC) 12/28/2018   • Peripheral vascular disease (CMS/HCC)    • Pinched nerve in neck    • PVD (peripheral vascular disease) (CMS/HCC) 12/07/2016   • S/P implantation of automatic cardioverter/defibrillator (AICD) 12/07/2016   • S/P implantation of automatic cardioverter/defibrillator (AICD)    • Stroke (CMS/HCC)      Family History   Problem Relation Age of Onset   • Cancer Father    • Heart disease Mother    • Diabetes Mother    • Sleep apnea Mother    • Hypertension Mother    • Coronary artery disease Other      Past Surgical History:   Procedure Laterality Date   • CARDIAC CATHETERIZATION N/A 12/28/2018    Procedure: Coronary angiography;  Surgeon: John Mckeon MD;  Location: [facility] CATH INVASIVE LOCATION;  Service: Cardiology   • CARDIAC DEFIBRILLATOR PLACEMENT     • CAROTID ENDARTERECTOMY     • CARPAL TUNNEL RELEASE Right    • CORONARY STENT PLACEMENT      x2   • FINGER SURGERY Right     tendon repair    • HYDROCELE EXCISION / REPAIR     • ILIAC ARTERY STENT     • INSERT / REPLACE / REMOVE PACEMAKER  2011   • ARLIN PROCEDURE     • TOTAL KNEE ARTHROPLASTY Left    • VARICOSE VEIN SURGERY Right 7/11/2018    Procedure: RIGHT SAPHENOUS VEIN RADIO FREQUENCY ABLATION;  Surgeon: Dave Chavarria DO;  Location: [facility] OR 12;  Service: Vascular     Social History     Socioeconomic History   • Marital status:      Spouse name: Not on file   • Number of children: Not on file   • Years of education: Not on file   • Highest education level: Not on file   Tobacco Use   •  "Smoking status: Former Smoker     Packs/day: 1.00     Years: 35.00     Pack years: 35.00     Types: Cigarettes     Last attempt to quit: 2010     Years since quittin.5   • Smokeless tobacco: Never Used   Substance and Sexual Activity   • Alcohol use: No   • Drug use: No   • Sexual activity: Defer       Review of Systems   Constitution: Negative for fatigue, weakness, malaise/fatigue, unexpected weight change, weight gain and weight loss.   Cardiovascular: Positive for leg swelling. Negative for chest pain, dyspnea on exertion, irregular heartbeat, near-syncope, orthopnea, palpitations, paroxysmal nocturnal dyspnea and syncope.   Respiratory: Negative for chest tightness, cough, shortness of breath, sleep disturbances due to breathing, sputum production and wheezing.    Skin: Negative for dry skin, flushing, itching and rash.   Gastrointestinal: Negative for hematemesis and hematochezia.   Neurological: Negative for dizziness, light-headedness and loss of balance.         ECG 12 Lead  Date/Time: 7/10/2019 2:15 PM  Performed by: Brenda Alvarado APRN  Authorized by: Brenda Alvarado APRN   Comparison: compared with previous ECG from 2019  Similar to previous ECG  Rhythm: sinus rhythm  Rate: normal  BPM: 78  Conduction: 1st degree AV block    Clinical impression: abnormal EKG          /58   Pulse 78   Ht 193 cm (76\")   Wt 98.9 kg (218 lb)   BMI 26.54 kg/m²        Objective:     Physical Exam   Constitutional: He is oriented to person, place, and time. He appears well-developed and well-nourished. No distress.   HENT:   Head: Normocephalic.   Mouth/Throat: No oropharyngeal exudate.   Eyes: Conjunctivae and EOM are normal. Pupils are equal, round, and reactive to light. No scleral icterus.   Neck: Normal range of motion. Neck supple.   Cardiovascular: Normal rate, regular rhythm, normal heart sounds and intact distal pulses.   No murmur heard.  Pulmonary/Chest: Effort normal and breath sounds normal. " No respiratory distress. He has no wheezes. He has no rales. He exhibits no tenderness.   Abdominal: Soft. Bowel sounds are normal. He exhibits no distension. There is no tenderness.   Musculoskeletal: Normal range of motion. He exhibits edema (2+ pitting edema BLE).   Neurological: He is alert and oriented to person, place, and time. He has normal reflexes.   Skin: Skin is warm and dry. No rash noted. He is not diaphoretic. No erythema. No pallor.   Psychiatric: He has a normal mood and affect. His behavior is normal.   Vitals reviewed.          Assessment:          Diagnosis Plan   1. Coronary artery disease involving native coronary artery of native heart without angina pectoris     2. Ischemic cardiomyopathy     3. S/P implantation of automatic cardioverter/defibrillator (AICD)     4. Chronic combined systolic (congestive) and diastolic (congestive) heart failure (CMS/HCC)     5. Ventricular tachycardia (CMS/HCC)     6. Stage 3 chronic kidney disease (CMS/HCC)     7. Essential hypertension     8. PVD (peripheral vascular disease) (CMS/[facility])            Plan:       1. CAD- no signs of ischemia. Will discuss with Dr. Mckeon about stopping ASA. Continue Brilinta.   2. ICMO- stable. Last echo showed EF 31-35%. Continue Coreg and lisinopril and lasix.   3. AICD  4. CHF- stable. Presence of BLE edema. Increase lasix to 80mg tomorrow and Friday and resume normal regimen on Saturday. Labs will be drawn next week prior to seeing Nephrology.   5. VT- 13 runs of NS-VT. Currently not taking amiodarone. Will contact Dr. Cantu's office for last office note and adjust medications accordingly.   6. CKD stage 3- Follows with Nephrology  7. HTN- controlled.   8. PVD-  Carotid disease followed by Vascular. He is s/p illiac stent x2 per Dr. Mckeon following a dissection in 12/2018. Continue Brilinta.     Follow up in 3 months or sooner if symptoms worsen.

## 2019-07-11 RX ORDER — LISINOPRIL 10 MG/1
TABLET ORAL
Qty: 90 TABLET | Refills: 3 | Status: SHIPPED | OUTPATIENT
Start: 2019-07-11 | End: 2020-01-01 | Stop reason: ALTCHOICE

## 2019-08-02 RX ORDER — FUROSEMIDE 40 MG/1
TABLET ORAL
Qty: 90 TABLET | Refills: 4 | Status: ON HOLD | OUTPATIENT
Start: 2019-08-02 | End: 2020-01-01

## 2019-09-09 PROBLEM — J31.0 CHRONIC RHINITIS: Status: ACTIVE | Noted: 2019-02-13

## 2019-10-01 ENCOUNTER — OFFICE VISIT (OUTPATIENT)
Dept: PULMONOLOGY | Facility: CLINIC | Age: 69
End: 2019-10-01

## 2019-10-01 VITALS
BODY MASS INDEX: 25.94 KG/M2 | SYSTOLIC BLOOD PRESSURE: 110 MMHG | HEART RATE: 86 BPM | DIASTOLIC BLOOD PRESSURE: 68 MMHG | WEIGHT: 213 LBS | HEIGHT: 76 IN | OXYGEN SATURATION: 95 %

## 2019-10-01 DIAGNOSIS — Z95.810 S/P IMPLANTATION OF AUTOMATIC CARDIOVERTER/DEFIBRILLATOR (AICD): ICD-10-CM

## 2019-10-01 DIAGNOSIS — J31.0 CHRONIC RHINITIS: ICD-10-CM

## 2019-10-01 DIAGNOSIS — J44.9 COPD, GROUP B, BY GOLD 2017 CLASSIFICATION (HCC): ICD-10-CM

## 2019-10-01 DIAGNOSIS — I25.5 ISCHEMIC CARDIOMYOPATHY: ICD-10-CM

## 2019-10-01 DIAGNOSIS — R09.82 POSTNASAL DISCHARGE: ICD-10-CM

## 2019-10-01 DIAGNOSIS — R05.3 CHRONIC COUGH: Primary | ICD-10-CM

## 2019-10-01 LAB
FEV1/FVC: NORMAL %
FEV1: NORMAL LITERS
FVC VOL RESPIRATORY: NORMAL LITERS

## 2019-10-01 PROCEDURE — 94010 BREATHING CAPACITY TEST: CPT | Performed by: NURSE PRACTITIONER

## 2019-10-01 PROCEDURE — 99214 OFFICE O/P EST MOD 30 MIN: CPT | Performed by: NURSE PRACTITIONER

## 2019-10-01 RX ORDER — PANTOPRAZOLE SODIUM 40 MG/1
40 TABLET, DELAYED RELEASE ORAL 2 TIMES DAILY
COMMUNITY

## 2019-10-01 NOTE — PATIENT INSTRUCTIONS

## 2019-10-10 ENCOUNTER — CLINICAL SUPPORT (OUTPATIENT)
Dept: CARDIOLOGY | Facility: CLINIC | Age: 69
End: 2019-10-10

## 2019-10-10 DIAGNOSIS — I25.5 ISCHEMIC CARDIOMYOPATHY: ICD-10-CM

## 2019-10-10 PROCEDURE — 93295 DEV INTERROG REMOTE 1/2/MLT: CPT | Performed by: PHYSICIAN ASSISTANT

## 2019-10-10 PROCEDURE — 93296 REM INTERROG EVL PM/IDS: CPT | Performed by: PHYSICIAN ASSISTANT

## 2019-10-13 NOTE — PROGRESS NOTES
Single Chamber AICD Evaluation Report  Latitude    October 13, 2019    Primary Cardiologist: Mike  : Guidant Model: Teligen  Implant date: 11/9/11    Reason for evaluation: routine  Indication for AICD: ischemic cardiomyopathy    Measurements  Ventricular sensing - R wave: 2.6 mV  Ventricular threshold: n/r  Ventricular lead impedance: 342 ohms  Shock Impedance: RV 49 ohms        Diagnostic Data  Paced: 1 %  Other: 8 sec run of NSVT  Battery status: satisfactory     Final Parameters  Mode: VVI  Lower rate: 50 bpm   Ventricular - Amplitude: 5.0 V   Pulse width: 0.4 ms   Sensitivity: 0.6 mV   Changes made: n/a  Conclusions: normal AICD function    Follow up: 3 months

## 2019-11-12 ENCOUNTER — OFFICE VISIT (OUTPATIENT)
Dept: CARDIOLOGY | Facility: CLINIC | Age: 69
End: 2019-11-12

## 2019-11-12 VITALS
SYSTOLIC BLOOD PRESSURE: 94 MMHG | HEART RATE: 67 BPM | WEIGHT: 222 LBS | BODY MASS INDEX: 27.6 KG/M2 | HEIGHT: 75 IN | DIASTOLIC BLOOD PRESSURE: 60 MMHG

## 2019-11-12 DIAGNOSIS — I25.10 CORONARY ARTERY DISEASE INVOLVING NATIVE CORONARY ARTERY OF NATIVE HEART WITHOUT ANGINA PECTORIS: Primary | ICD-10-CM

## 2019-11-12 PROCEDURE — 93000 ELECTROCARDIOGRAM COMPLETE: CPT | Performed by: NURSE PRACTITIONER

## 2019-11-12 PROCEDURE — 99214 OFFICE O/P EST MOD 30 MIN: CPT | Performed by: NURSE PRACTITIONER

## 2019-11-12 RX ORDER — ASPIRIN 81 MG/1
81 TABLET ORAL DAILY
COMMUNITY

## 2019-11-12 NOTE — PROGRESS NOTES
"    Subjective:     Encounter Date:11/12/2019      Patient ID: Jadon Flynn is a 69 y.o. male.    Chief Complaint: follow up CAD and CHF   The patient presents for a follow up regarding his history of CHF s/p AICD, NSVT also followed by Dr. Cantu, and CAD. He states he is feeling well overall. He was off of his aspirin temporarily after a GI bleed r/t ulcers earlier this year, but states he is now back on this. He also reports he quit taking his amiodarone several months ago- he is unsure exactly when and why. He reports compliance with his other medications. He denies chest pain, shortness of breath, edema, orthopnea, PND, palpitations, syncope or pre syncope. He states his blood pressure has been low recently - sbp 90s, but he is not symptomatic with this. He reports compliance with a low sodium diet, but does not weigh daily.         The following portions of the patient's history were reviewed and updated as appropriate: allergies, current medications, past family history, past medical history, past social history, past surgical history and problem list.  BP 94/60 (BP Location: Right arm, Patient Position: Sitting)   Pulse 67   Ht 190.5 cm (75\")   Wt 101 kg (222 lb)   BMI 27.75 kg/m²   No Known Allergies    Current Outpatient Medications:   •  albuterol sulfate  (90 Base) MCG/ACT inhaler, Inhale 2 puffs Every 4 (Four) Hours As Needed for Wheezing., Disp: 54 g, Rfl: 3  •  allopurinol (ZYLOPRIM) 100 MG tablet, Take 200 mg by mouth 2 (Two) Times a Day., Disp: , Rfl:   •  aspirin 81 MG EC tablet, Take 81 mg by mouth Daily., Disp: , Rfl:   •  atorvastatin (LIPITOR) 40 MG tablet, Take 1 tablet by mouth Daily., Disp: 90 tablet, Rfl: 3  •  azelastine (ASTELIN) 0.1 % nasal spray, 2 sprays into the nostril(s) as directed by provider Daily. Use in each nostril as directed, Disp: 1 each, Rfl: 11  •  carvedilol (COREG) 25 MG tablet, TAKE 1 TABLET TWICE A DAY WITH MEALS, Disp: 180 tablet, Rfl: 3  •  " furosemide (LASIX) 40 MG tablet, TAKE 1 TABLET DAILY, Disp: 90 tablet, Rfl: 4  •  HYDROcodone-acetaminophen (NORCO) 7.5-325 MG per tablet, take 1 tablet by mouth three times a day if needed for pain -FILL DATE 11-, Disp: , Rfl: 0  •  lisinopril (PRINIVIL,ZESTRIL) 10 MG tablet, TAKE 1 TABLET DAILY, Disp: 90 tablet, Rfl: 3  •  pantoprazole (PROTONIX) 40 MG EC tablet, Take 40 mg by mouth Daily., Disp: , Rfl:   •  ticagrelor (BRILINTA) 90 MG tablet tablet, Take 1 tablet by mouth 2 (Two) Times a Day., Disp: 180 tablet, Rfl: 3  Past Medical History:   Diagnosis Date   • Asymptomatic bilateral carotid artery stenosis    • CAD (coronary artery disease) 12/07/2016   • CAD in native artery      2009 stents   • Carotid artery disease (CMS/[facility])    • CHF (congestive heart failure) (CMS/[facility]) 12/07/2016   • Chronic combined systolic and diastolic CHF (congestive heart failure) (CMS/[facility])     echo 5/2013 EF 35-40%   • Chronic systolic CHF (congestive heart failure), NYHA class 2 (CMS/[facility])    • CKD (chronic kidney disease) 12/07/2016   • CKD (chronic kidney disease), stage III (CMS/[facility])    • COPD, group B, by GOLD 2017 classification (CMS/[facility]) 11/20/2018   • Essential hypertension    • Gout    • HLD (hyperlipidemia) 12/07/2016   • HTN (hypertension) 12/07/2016   • Hyperkalemia    • Hyperlipidemia, unspecified    • Ischemic heart disease 12/07/2016   • Ischemic heart disease    • NSTEMI (non-ST elevated myocardial infarction) (CMS/[facility]) 12/28/2018   • Peripheral vascular disease (CMS/[facility])    • Pinched nerve in neck    • PVD (peripheral vascular disease) (CMS/[facility]) 12/07/2016   • S/P implantation of automatic cardioverter/defibrillator (AICD) 12/07/2016   • S/P implantation of automatic cardioverter/defibrillator (AICD)    • Stroke (CMS/[facility])      Past Surgical History:   Procedure Laterality Date   • CARDIAC CATHETERIZATION N/A 12/28/2018    Procedure: Coronary angiography;  Surgeon: John Mckeon MD;  Location: [facility] CATH  INVASIVE LOCATION;  Service: Cardiology   • CARDIAC DEFIBRILLATOR PLACEMENT     • CAROTID ENDARTERECTOMY     • CARPAL TUNNEL RELEASE Right    • CORONARY STENT PLACEMENT      x2   • FINGER SURGERY Right     tendon repair    • HYDROCELE EXCISION / REPAIR     • ILIAC ARTERY STENT     • INSERT / REPLACE / REMOVE PACEMAKER     • ARLIN PROCEDURE     • TOTAL KNEE ARTHROPLASTY Left    • VARICOSE VEIN SURGERY Right 2018    Procedure: RIGHT SAPHENOUS VEIN RADIO FREQUENCY ABLATION;  Surgeon: Dave Chavarria DO;  Location: [facility] HYBRID OR 12;  Service: Vascular     Social History     Socioeconomic History   • Marital status:      Spouse name: Not on file   • Number of children: Not on file   • Years of education: Not on file   • Highest education level: Not on file   Tobacco Use   • Smoking status: Former Smoker     Packs/day: 1.00     Years: 35.00     Pack years: 35.00     Types: Cigarettes     Last attempt to quit: 2010     Years since quittin.8   • Smokeless tobacco: Never Used   Substance and Sexual Activity   • Alcohol use: No   • Drug use: No   • Sexual activity: Defer     Family History   Problem Relation Age of Onset   • Cancer Father    • Heart disease Mother    • Diabetes Mother    • Sleep apnea Mother    • Hypertension Mother    • Coronary artery disease Other        Review of Systems   Constitution: Negative for chills, diaphoresis, fever, weakness and malaise/fatigue.   HENT: Negative for nosebleeds.    Eyes: Negative for visual disturbance.   Cardiovascular: Negative for chest pain, claudication, cyanosis, dyspnea on exertion, irregular heartbeat, leg swelling, near-syncope, orthopnea, palpitations, paroxysmal nocturnal dyspnea and syncope.   Respiratory: Negative for cough, hemoptysis, shortness of breath, sputum production and wheezing.    Hematologic/Lymphatic: Negative for bleeding problem.   Skin: Negative for color change and flushing.   Musculoskeletal: Negative for falls.    Gastrointestinal: Negative for bloating, abdominal pain, hematemesis, hematochezia, melena, nausea and vomiting.   Genitourinary: Negative for hematuria.   Neurological: Negative for dizziness and light-headedness.   Psychiatric/Behavioral: Negative for altered mental status.         ECG 12 Lead  Date/Time: 11/12/2019 4:31 PM  Performed by: Sisi Rutledge APRN  Authorized by: Sisi Rutledge APRN   Comparison: compared with previous ECG from 7/10/2019  Similar to previous ECG  Rhythm: sinus rhythm  Conduction: left bundle branch block               Objective:     Physical Exam   Constitutional: He is oriented to person, place, and time. He appears well-developed and well-nourished. No distress.   HENT:   Head: Normocephalic and atraumatic.   Eyes: Pupils are equal, round, and reactive to light.   Neck: Normal range of motion. Neck supple. No JVD present. No thyromegaly present.   Cardiovascular: Normal rate, regular rhythm, normal heart sounds and intact distal pulses. Exam reveals no gallop and no friction rub.   No murmur heard.  Pulmonary/Chest: Effort normal. No respiratory distress. He has no wheezes. He has no rales. He exhibits no tenderness.   Abdominal: Soft. Bowel sounds are normal. He exhibits no distension. There is no tenderness.   Musculoskeletal: Normal range of motion. He exhibits no edema.   Neurological: He is alert and oriented to person, place, and time. No cranial nerve deficit.   Skin: Skin is warm and dry. He is not diaphoretic.   Psychiatric: He has a normal mood and affect. His behavior is normal.     Echo Review:   12/2018 :  · Left ventricular systolic function is moderately decreased. Estimated ejection fraction is 31-35%.  · The left ventricular cavity is severely dilated.  · Left ventricular diastolic dysfunction (grade II) consistent with pseudonormalization.  · Right ventricular cavity is moderately dilated.  · There is bi-atrial enlargement.  · Moderate mitral valve regurgitation is  present  · Mild to moderate tricuspid valve regurgitation is present.  · Mild pulmonic valve regurgitation is present.  · Estimated right ventricular systolic pressure from tricuspid regurgitation is mildly elevated (35-45 mmHg).      Assessment:          Diagnosis Plan   1. Coronary artery disease involving native coronary artery of native heart without angina pectoris  Stable. No angina     PCI with MELISA to proximal LAD 12/2018 for NSTEMI     PCI to LAD 2011    Of note, he has a history of Plavix resistance   He is now back on ASA and continue Brilinta (had GI bleed earlier this year - treated at [facility])     2. Chronic systolic congestive heart failure    Stage C, Class II. Compensated.       3. S/P implantation of automatic cardioverter/defibrillator (AICD)  Single chamber- per notes by Dr. Cantu he may be a candidate for a BIV upgrade in the future if his HF symptoms worsen      4. Chronic kidney disease, unspecified CKD stage  Followed by Dr. Trevino      5. Venous insufficiency    Followed by Dr. Chavarria.      6. PVD (peripheral vascular disease)  Carotid disease followed by Dr. Chavarria    He is s/p right iliac stents x 2 per Dr. Mckeon following a dissection 12/2018      7. Essential hypertension  Controlled ; BP does not allow up up titration of ACEI        8. Left bundle branch block - chronic    9. Non sustained ventricular tachycardia - followed by Dr. Cantu, 10/2019 ICD interrogation reviewed - one 8 second run of NSVT, he is longer on amio- will discuss potentially resuming this with Dr. Cantu (he reduced this to 200 mg daily in Feb, and mentioned decreasing the dose further later this year) , no c/o palpitations, syncope or pre syncope     10. COPD - followed by pulmonology     11. Hyperlipidemia - continue statin, obtain more recent lipid panel from pcp      Plan:       As noted above   Continue ASA, Brilinta , statin, coreg, lasix, and lisinopril.  We discussed flexible lasix dosing - he may take 1  additional 40 mg tablet daily PRN for worsening dyspnea, orthopnea, edema, or sudden significant weight gain   Follow up 3 months, sooner with new or worsening symptoms to concerns     Reviewed signs and symptoms of CHF and what to report with the patient. Patient instructed to restrict sodium and weigh daily. Report weight gain of greater than 2 lbs overnight or 5 lbs in 1 week. Pt verbalized understanding of instructions and plan of care.

## 2019-11-18 ENCOUNTER — TELEPHONE (OUTPATIENT)
Dept: CARDIOLOGY | Facility: CLINIC | Age: 69
End: 2019-11-18

## 2019-11-18 NOTE — TELEPHONE ENCOUNTER
Dr. Cantu's RN, Ashley, called stating that Dr. Cantu had reviewed patient's remote AICD transmission as requested by [facility], and that he was amenable to patient staying off of amiodarone at this time.

## 2020-01-01 ENCOUNTER — OUTSIDE FACILITY SERVICE (OUTPATIENT)
Dept: CARDIOLOGY | Facility: CLINIC | Age: 70
End: 2020-01-01

## 2020-01-01 ENCOUNTER — OFFICE VISIT (OUTPATIENT)
Dept: VASCULAR SURGERY | Facility: CLINIC | Age: 70
End: 2020-01-01

## 2020-01-01 ENCOUNTER — READMISSION MANAGEMENT (OUTPATIENT)
Dept: CALL CENTER | Facility: HOSPITAL | Age: 70
End: 2020-01-01

## 2020-01-01 ENCOUNTER — ANESTHESIA (OUTPATIENT)
Dept: PERIOP | Facility: HOSPITAL | Age: 70
End: 2020-01-01

## 2020-01-01 ENCOUNTER — APPOINTMENT (OUTPATIENT)
Dept: GENERAL RADIOLOGY | Facility: HOSPITAL | Age: 70
End: 2020-01-01

## 2020-01-01 ENCOUNTER — APPOINTMENT (OUTPATIENT)
Dept: CARDIOLOGY | Facility: HOSPITAL | Age: 70
End: 2020-01-01

## 2020-01-01 ENCOUNTER — HOSPITAL ENCOUNTER (INPATIENT)
Facility: HOSPITAL | Age: 70
LOS: 8 days | Discharge: HOME | End: 2020-02-28
Attending: FAMILY MEDICINE | Admitting: INTERNAL MEDICINE

## 2020-01-01 ENCOUNTER — APPOINTMENT (OUTPATIENT)
Dept: ULTRASOUND IMAGING | Facility: HOSPITAL | Age: 70
End: 2020-01-01

## 2020-01-01 ENCOUNTER — OFFICE VISIT (OUTPATIENT)
Dept: CARDIOLOGY | Facility: CLINIC | Age: 70
End: 2020-01-01

## 2020-01-01 ENCOUNTER — APPOINTMENT (OUTPATIENT)
Dept: LAB | Facility: HOSPITAL | Age: 70
End: 2020-01-01

## 2020-01-01 ENCOUNTER — PREP FOR SURGERY (OUTPATIENT)
Dept: OTHER | Facility: HOSPITAL | Age: 70
End: 2020-01-01

## 2020-01-01 ENCOUNTER — OFFICE VISIT (OUTPATIENT)
Dept: PULMONOLOGY | Facility: CLINIC | Age: 70
End: 2020-01-01

## 2020-01-01 ENCOUNTER — CLINICAL SUPPORT NO REQUIREMENTS (OUTPATIENT)
Dept: CARDIOLOGY | Facility: CLINIC | Age: 70
End: 2020-01-01

## 2020-01-01 ENCOUNTER — HOSPITAL ENCOUNTER (OUTPATIENT)
Dept: ULTRASOUND IMAGING | Facility: HOSPITAL | Age: 70
Discharge: HOME | End: 2020-10-16

## 2020-01-01 ENCOUNTER — DOCUMENTATION (OUTPATIENT)
Dept: CARDIOLOGY | Facility: CLINIC | Age: 70
End: 2020-01-01

## 2020-01-01 ENCOUNTER — TELEPHONE (OUTPATIENT)
Dept: VASCULAR SURGERY | Facility: CLINIC | Age: 70
End: 2020-01-01

## 2020-01-01 ENCOUNTER — TELEPHONE (OUTPATIENT)
Dept: CARDIOLOGY | Facility: CLINIC | Age: 70
End: 2020-01-01

## 2020-01-01 ENCOUNTER — APPOINTMENT (OUTPATIENT)
Dept: INTERVENTIONAL RADIOLOGY/VASCULAR | Facility: HOSPITAL | Age: 70
End: 2020-01-01

## 2020-01-01 ENCOUNTER — RESULTS ENCOUNTER (OUTPATIENT)
Dept: CARDIOLOGY | Facility: CLINIC | Age: 70
End: 2020-01-01

## 2020-01-01 ENCOUNTER — HOSPITAL ENCOUNTER (OUTPATIENT)
Facility: HOSPITAL | Age: 70
Setting detail: HOSPITAL OUTPATIENT SURGERY
Discharge: HOME | End: 2020-10-30
Attending: SURGERY | Admitting: SURGERY

## 2020-01-01 ENCOUNTER — CLINICAL SUPPORT (OUTPATIENT)
Dept: CARDIOLOGY | Facility: CLINIC | Age: 70
End: 2020-01-01

## 2020-01-01 ENCOUNTER — LAB (OUTPATIENT)
Dept: LAB | Facility: HOSPITAL | Age: 70
End: 2020-01-01

## 2020-01-01 ENCOUNTER — HOSPITAL ENCOUNTER (INPATIENT)
Facility: HOSPITAL | Age: 70
LOS: 2 days | Discharge: HOME | End: 2020-10-12
Attending: EMERGENCY MEDICINE | Admitting: INTERNAL MEDICINE

## 2020-01-01 ENCOUNTER — APPOINTMENT (OUTPATIENT)
Dept: PREADMISSION TESTING | Facility: HOSPITAL | Age: 70
End: 2020-01-01

## 2020-01-01 ENCOUNTER — HOSPITAL ENCOUNTER (OUTPATIENT)
Dept: ULTRASOUND IMAGING | Facility: HOSPITAL | Age: 70
Discharge: HOME | End: 2020-11-13
Admitting: NURSE PRACTITIONER

## 2020-01-01 ENCOUNTER — ANESTHESIA EVENT (OUTPATIENT)
Dept: PERIOP | Facility: HOSPITAL | Age: 70
End: 2020-01-01

## 2020-01-01 ENCOUNTER — HOSPITAL ENCOUNTER (OUTPATIENT)
Facility: HOSPITAL | Age: 70
Setting detail: HOSPITAL OUTPATIENT SURGERY
Discharge: HOME | End: 2020-04-01
Attending: SURGERY | Admitting: SURGERY

## 2020-01-01 ENCOUNTER — HOSPITAL ENCOUNTER (INPATIENT)
Facility: HOSPITAL | Age: 70
LOS: 3 days | Discharge: HOME | End: 2020-01-21
Attending: INTERNAL MEDICINE | Admitting: INTERNAL MEDICINE

## 2020-01-01 ENCOUNTER — APPOINTMENT (OUTPATIENT)
Dept: CT IMAGING | Facility: HOSPITAL | Age: 70
End: 2020-01-01

## 2020-01-01 ENCOUNTER — TRANSCRIBE ORDERS (OUTPATIENT)
Dept: ADMINISTRATIVE | Facility: HOSPITAL | Age: 70
End: 2020-01-01

## 2020-01-01 VITALS
DIASTOLIC BLOOD PRESSURE: 54 MMHG | RESPIRATION RATE: 18 BRPM | HEART RATE: 78 BPM | SYSTOLIC BLOOD PRESSURE: 100 MMHG | HEIGHT: 76 IN | TEMPERATURE: 98.1 F | BODY MASS INDEX: 26.77 KG/M2 | OXYGEN SATURATION: 95 % | WEIGHT: 219.8 LBS

## 2020-01-01 VITALS
DIASTOLIC BLOOD PRESSURE: 60 MMHG | TEMPERATURE: 97.6 F | RESPIRATION RATE: 18 BRPM | SYSTOLIC BLOOD PRESSURE: 94 MMHG | HEIGHT: 76 IN | HEART RATE: 63 BPM | OXYGEN SATURATION: 96 % | WEIGHT: 229.28 LBS | BODY MASS INDEX: 27.92 KG/M2

## 2020-01-01 VITALS
HEART RATE: 63 BPM | HEIGHT: 74 IN | OXYGEN SATURATION: 93 % | DIASTOLIC BLOOD PRESSURE: 39 MMHG | SYSTOLIC BLOOD PRESSURE: 105 MMHG | BODY MASS INDEX: 28.94 KG/M2 | RESPIRATION RATE: 16 BRPM | WEIGHT: 225.53 LBS

## 2020-01-01 VITALS
WEIGHT: 226 LBS | HEIGHT: 75 IN | SYSTOLIC BLOOD PRESSURE: 118 MMHG | BODY MASS INDEX: 28.1 KG/M2 | DIASTOLIC BLOOD PRESSURE: 68 MMHG

## 2020-01-01 VITALS
SYSTOLIC BLOOD PRESSURE: 106 MMHG | WEIGHT: 218 LBS | OXYGEN SATURATION: 98 % | HEIGHT: 76 IN | BODY MASS INDEX: 26.55 KG/M2 | HEART RATE: 75 BPM | DIASTOLIC BLOOD PRESSURE: 68 MMHG

## 2020-01-01 VITALS
HEIGHT: 74 IN | BODY MASS INDEX: 28.62 KG/M2 | DIASTOLIC BLOOD PRESSURE: 56 MMHG | SYSTOLIC BLOOD PRESSURE: 98 MMHG | OXYGEN SATURATION: 98 % | HEART RATE: 72 BPM | WEIGHT: 223 LBS

## 2020-01-01 VITALS
RESPIRATION RATE: 20 BRPM | SYSTOLIC BLOOD PRESSURE: 97 MMHG | OXYGEN SATURATION: 98 % | HEIGHT: 76 IN | HEART RATE: 68 BPM | TEMPERATURE: 98.1 F | WEIGHT: 220.2 LBS | DIASTOLIC BLOOD PRESSURE: 54 MMHG | BODY MASS INDEX: 26.81 KG/M2

## 2020-01-01 VITALS
SYSTOLIC BLOOD PRESSURE: 83 MMHG | RESPIRATION RATE: 16 BRPM | TEMPERATURE: 99.4 F | HEART RATE: 68 BPM | DIASTOLIC BLOOD PRESSURE: 45 MMHG | OXYGEN SATURATION: 92 %

## 2020-01-01 VITALS
BODY MASS INDEX: 30.36 KG/M2 | WEIGHT: 229.06 LBS | HEART RATE: 64 BPM | TEMPERATURE: 97.8 F | DIASTOLIC BLOOD PRESSURE: 60 MMHG | OXYGEN SATURATION: 98 % | HEIGHT: 73 IN | SYSTOLIC BLOOD PRESSURE: 111 MMHG | RESPIRATION RATE: 16 BRPM

## 2020-01-01 VITALS
BODY MASS INDEX: 26.57 KG/M2 | HEART RATE: 60 BPM | SYSTOLIC BLOOD PRESSURE: 110 MMHG | DIASTOLIC BLOOD PRESSURE: 64 MMHG | WEIGHT: 225 LBS | OXYGEN SATURATION: 97 % | HEIGHT: 77 IN

## 2020-01-01 VITALS
OXYGEN SATURATION: 98 % | BODY MASS INDEX: 28.49 KG/M2 | TEMPERATURE: 98.1 F | SYSTOLIC BLOOD PRESSURE: 100 MMHG | WEIGHT: 234 LBS | DIASTOLIC BLOOD PRESSURE: 58 MMHG | HEART RATE: 64 BPM | HEIGHT: 76 IN

## 2020-01-01 VITALS
DIASTOLIC BLOOD PRESSURE: 50 MMHG | HEIGHT: 76 IN | HEART RATE: 74 BPM | WEIGHT: 203 LBS | OXYGEN SATURATION: 98 % | BODY MASS INDEX: 24.72 KG/M2 | SYSTOLIC BLOOD PRESSURE: 98 MMHG

## 2020-01-01 VITALS — HEIGHT: 76 IN | WEIGHT: 235 LBS | BODY MASS INDEX: 28.62 KG/M2

## 2020-01-01 VITALS
RESPIRATION RATE: 18 BRPM | WEIGHT: 226 LBS | HEIGHT: 75 IN | BODY MASS INDEX: 28.1 KG/M2 | DIASTOLIC BLOOD PRESSURE: 65 MMHG | SYSTOLIC BLOOD PRESSURE: 115 MMHG | OXYGEN SATURATION: 97 % | HEART RATE: 67 BPM

## 2020-01-01 VITALS
BODY MASS INDEX: 28.62 KG/M2 | HEIGHT: 74 IN | SYSTOLIC BLOOD PRESSURE: 118 MMHG | OXYGEN SATURATION: 96 % | DIASTOLIC BLOOD PRESSURE: 70 MMHG | HEART RATE: 79 BPM | WEIGHT: 223 LBS

## 2020-01-01 DIAGNOSIS — I10 ESSENTIAL HYPERTENSION: ICD-10-CM

## 2020-01-01 DIAGNOSIS — N17.9 ACUTE RENAL FAILURE SUPERIMPOSED ON STAGE 4 CHRONIC KIDNEY DISEASE, UNSPECIFIED ACUTE RENAL FAILURE TYPE (HCC): ICD-10-CM

## 2020-01-01 DIAGNOSIS — I50.23 ACUTE ON CHRONIC SYSTOLIC HEART FAILURE (HCC): ICD-10-CM

## 2020-01-01 DIAGNOSIS — Z95.810 S/P IMPLANTATION OF AUTOMATIC CARDIOVERTER/DEFIBRILLATOR (AICD): ICD-10-CM

## 2020-01-01 DIAGNOSIS — N17.9 ACUTE RENAL FAILURE SUPERIMPOSED ON STAGE 4 CHRONIC KIDNEY DISEASE, UNSPECIFIED ACUTE RENAL FAILURE TYPE (HCC): Primary | ICD-10-CM

## 2020-01-01 DIAGNOSIS — N18.6 CHRONIC KIDNEY DISEASE ON CHRONIC DIALYSIS (HCC): Primary | ICD-10-CM

## 2020-01-01 DIAGNOSIS — I65.23 BILATERAL CAROTID ARTERY STENOSIS: ICD-10-CM

## 2020-01-01 DIAGNOSIS — I50.22 CHRONIC SYSTOLIC HEART FAILURE (HCC): ICD-10-CM

## 2020-01-01 DIAGNOSIS — Z99.2 S/P HEMODIALYSIS CATHETER INSERTION (HCC): ICD-10-CM

## 2020-01-01 DIAGNOSIS — Z79.02 ENCOUNTER FOR MONITORING ANTIPLATELET THERAPY: ICD-10-CM

## 2020-01-01 DIAGNOSIS — I25.10 CORONARY ARTERY DISEASE INVOLVING NATIVE CORONARY ARTERY OF NATIVE HEART WITHOUT ANGINA PECTORIS: ICD-10-CM

## 2020-01-01 DIAGNOSIS — I25.10 CORONARY ARTERY DISEASE INVOLVING NATIVE CORONARY ARTERY OF NATIVE HEART WITHOUT ANGINA PECTORIS: Primary | ICD-10-CM

## 2020-01-01 DIAGNOSIS — Z51.81 ENCOUNTER FOR MONITORING ANTIPLATELET THERAPY: ICD-10-CM

## 2020-01-01 DIAGNOSIS — N18.6 ESRD (END STAGE RENAL DISEASE) ON DIALYSIS (HCC): ICD-10-CM

## 2020-01-01 DIAGNOSIS — I73.9 PVD (PERIPHERAL VASCULAR DISEASE) (HCC): ICD-10-CM

## 2020-01-01 DIAGNOSIS — R07.9 CHEST PAIN, UNSPECIFIED TYPE: Primary | ICD-10-CM

## 2020-01-01 DIAGNOSIS — I27.20 MODERATE TO SEVERE PULMONARY HYPERTENSION (HCC): ICD-10-CM

## 2020-01-01 DIAGNOSIS — Z95.5 S/P CORONARY ARTERY STENT PLACEMENT: ICD-10-CM

## 2020-01-01 DIAGNOSIS — N18.4 STAGE 4 CHRONIC KIDNEY DISEASE (HCC): Primary | ICD-10-CM

## 2020-01-01 DIAGNOSIS — N18.4 ACUTE RENAL FAILURE SUPERIMPOSED ON STAGE 4 CHRONIC KIDNEY DISEASE, UNSPECIFIED ACUTE RENAL FAILURE TYPE (HCC): ICD-10-CM

## 2020-01-01 DIAGNOSIS — I50.22 CHRONIC SYSTOLIC HEART FAILURE (HCC): Primary | ICD-10-CM

## 2020-01-01 DIAGNOSIS — J31.0 CHRONIC RHINITIS: ICD-10-CM

## 2020-01-01 DIAGNOSIS — I50.22 CHRONIC SYSTOLIC CONGESTIVE HEART FAILURE (HCC): Primary | ICD-10-CM

## 2020-01-01 DIAGNOSIS — Z95.810 S/P IMPLANTATION OF AUTOMATIC CARDIOVERTER/DEFIBRILLATOR (AICD): Primary | ICD-10-CM

## 2020-01-01 DIAGNOSIS — J44.9 COPD, GROUP B, BY GOLD 2017 CLASSIFICATION (HCC): Primary | ICD-10-CM

## 2020-01-01 DIAGNOSIS — N18.6 CHRONIC KIDNEY DISEASE ON CHRONIC DIALYSIS (HCC): ICD-10-CM

## 2020-01-01 DIAGNOSIS — Z99.2 ESRD (END STAGE RENAL DISEASE) ON DIALYSIS (HCC): ICD-10-CM

## 2020-01-01 DIAGNOSIS — I47.29 NSVT (NONSUSTAINED VENTRICULAR TACHYCARDIA) (HCC): ICD-10-CM

## 2020-01-01 DIAGNOSIS — I50.23 ACUTE ON CHRONIC SYSTOLIC HEART FAILURE (HCC): Primary | ICD-10-CM

## 2020-01-01 DIAGNOSIS — I47.20 V-TACH (HCC): ICD-10-CM

## 2020-01-01 DIAGNOSIS — E78.2 MIXED HYPERLIPIDEMIA: ICD-10-CM

## 2020-01-01 DIAGNOSIS — J44.9 COPD, GROUP B, BY GOLD 2017 CLASSIFICATION (HCC): ICD-10-CM

## 2020-01-01 DIAGNOSIS — Z99.2 CHRONIC KIDNEY DISEASE ON CHRONIC DIALYSIS (HCC): ICD-10-CM

## 2020-01-01 DIAGNOSIS — E78.2 MIXED HYPERLIPIDEMIA: Primary | ICD-10-CM

## 2020-01-01 DIAGNOSIS — N18.9 CHRONIC KIDNEY DISEASE, UNSPECIFIED CKD STAGE: ICD-10-CM

## 2020-01-01 DIAGNOSIS — I73.9 PAD (PERIPHERAL ARTERY DISEASE) (HCC): ICD-10-CM

## 2020-01-01 DIAGNOSIS — Z01.818 PREOP TESTING: Primary | ICD-10-CM

## 2020-01-01 DIAGNOSIS — T82.120A DISPLACEMENT OF IMPLANTABLE CARDIOVERTER-DEFIBRILLATOR (ICD) LEAD, INITIAL ENCOUNTER: ICD-10-CM

## 2020-01-01 DIAGNOSIS — M79.89 LEG SWELLING: ICD-10-CM

## 2020-01-01 DIAGNOSIS — N17.9 AKI (ACUTE KIDNEY INJURY) (HCC): Primary | ICD-10-CM

## 2020-01-01 DIAGNOSIS — I25.5 ISCHEMIC CARDIOMYOPATHY: ICD-10-CM

## 2020-01-01 DIAGNOSIS — N18.4 STAGE 4 CHRONIC KIDNEY DISEASE (HCC): ICD-10-CM

## 2020-01-01 DIAGNOSIS — I73.9 PVD (PERIPHERAL VASCULAR DISEASE) (HCC): Primary | ICD-10-CM

## 2020-01-01 DIAGNOSIS — Z99.2 CHRONIC KIDNEY DISEASE ON CHRONIC DIALYSIS (HCC): Primary | ICD-10-CM

## 2020-01-01 DIAGNOSIS — I65.23 BILATERAL CAROTID ARTERY STENOSIS: Primary | ICD-10-CM

## 2020-01-01 DIAGNOSIS — Z01.818 PREOP TESTING: ICD-10-CM

## 2020-01-01 DIAGNOSIS — I77.0 A-V FISTULA (HCC): Primary | ICD-10-CM

## 2020-01-01 DIAGNOSIS — N17.9 AKI (ACUTE KIDNEY INJURY) (HCC): ICD-10-CM

## 2020-01-01 DIAGNOSIS — N18.4 ACUTE RENAL FAILURE SUPERIMPOSED ON STAGE 4 CHRONIC KIDNEY DISEASE, UNSPECIFIED ACUTE RENAL FAILURE TYPE (HCC): Primary | ICD-10-CM

## 2020-01-01 DIAGNOSIS — Z95.810 AICD (AUTOMATIC CARDIOVERTER/DEFIBRILLATOR) PRESENT: ICD-10-CM

## 2020-01-01 DIAGNOSIS — I50.42 CHRONIC COMBINED SYSTOLIC AND DIASTOLIC CONGESTIVE HEART FAILURE (HCC): Primary | ICD-10-CM

## 2020-01-01 LAB
ABO GROUP BLD: NORMAL
ABO GROUP BLD: NORMAL
ALBUMIN SERPL-MCNC: 3.3 G/DL (ref 3.5–5.2)
ALBUMIN SERPL-MCNC: 3.5 G/DL (ref 3.5–5.2)
ALBUMIN SERPL-MCNC: 3.6 G/DL (ref 3.5–5.2)
ALBUMIN SERPL-MCNC: 3.7 G/DL (ref 3.5–5.2)
ALBUMIN SERPL-MCNC: 3.7 G/DL (ref 3.5–5.2)
ALBUMIN SERPL-MCNC: 4 G/DL (ref 3.5–5.2)
ALBUMIN SERPL-MCNC: 4 G/DL (ref 3.5–5.2)
ALBUMIN SERPL-MCNC: 4.1 G/DL (ref 3.5–5.2)
ALBUMIN SERPL-MCNC: 4.2 G/DL (ref 3.5–5.2)
ALBUMIN/GLOB SERPL: 1.3 G/DL
ALBUMIN/GLOB SERPL: 1.6 G/DL
ALBUMIN/GLOB SERPL: 1.7 G/DL
ALBUMIN/GLOB SERPL: 1.8 G/DL
ALP SERPL-CCNC: 112 U/L (ref 39–117)
ALP SERPL-CCNC: 114 U/L (ref 39–117)
ALP SERPL-CCNC: 125 U/L (ref 39–117)
ALP SERPL-CCNC: 134 U/L (ref 39–117)
ALP SERPL-CCNC: 137 U/L (ref 39–117)
ALP SERPL-CCNC: 142 U/L (ref 39–117)
ALP SERPL-CCNC: 145 U/L (ref 39–117)
ALP SERPL-CCNC: 145 U/L (ref 39–117)
ALP SERPL-CCNC: 185 U/L (ref 39–117)
ALT SERPL W P-5'-P-CCNC: 17 U/L (ref 1–41)
ALT SERPL W P-5'-P-CCNC: 18 U/L (ref 1–41)
ALT SERPL W P-5'-P-CCNC: 19 U/L (ref 1–41)
ALT SERPL W P-5'-P-CCNC: 19 U/L (ref 1–41)
ALT SERPL W P-5'-P-CCNC: 20 U/L (ref 1–41)
ALT SERPL W P-5'-P-CCNC: 27 U/L (ref 1–41)
ALT SERPL W P-5'-P-CCNC: 50 U/L (ref 1–41)
ALT SERPL W P-5'-P-CCNC: 54 U/L (ref 1–41)
ALT SERPL W P-5'-P-CCNC: 70 U/L (ref 1–41)
ANION GAP SERPL CALCULATED.3IONS-SCNC: 11 MMOL/L (ref 5–15)
ANION GAP SERPL CALCULATED.3IONS-SCNC: 11 MMOL/L (ref 5–15)
ANION GAP SERPL CALCULATED.3IONS-SCNC: 12 MMOL/L (ref 5–15)
ANION GAP SERPL CALCULATED.3IONS-SCNC: 12 MMOL/L (ref 5–15)
ANION GAP SERPL CALCULATED.3IONS-SCNC: 13 MMOL/L (ref 5–15)
ANION GAP SERPL CALCULATED.3IONS-SCNC: 13 MMOL/L (ref 5–15)
ANION GAP SERPL CALCULATED.3IONS-SCNC: 14 MMOL/L (ref 5–15)
ANION GAP SERPL CALCULATED.3IONS-SCNC: 14 MMOL/L (ref 5–15)
ANION GAP SERPL CALCULATED.3IONS-SCNC: 15 MMOL/L (ref 5–15)
ANION GAP SERPL CALCULATED.3IONS-SCNC: 16 MMOL/L (ref 5–15)
ANION GAP SERPL CALCULATED.3IONS-SCNC: 17 MMOL/L (ref 5–15)
ANION GAP SERPL CALCULATED.3IONS-SCNC: 8 MMOL/L (ref 5–15)
ANION GAP SERPL CALCULATED.3IONS-SCNC: 8 MMOL/L (ref 5–15)
ANION GAP SERPL CALCULATED.3IONS-SCNC: 9 MMOL/L (ref 5–15)
ANION GAP SERPL CALCULATED.3IONS-SCNC: 9 MMOL/L (ref 5–15)
APTT PPP: 34.2 SECONDS (ref 24.1–35)
AST SERPL-CCNC: 26 U/L (ref 1–40)
AST SERPL-CCNC: 29 U/L (ref 1–40)
AST SERPL-CCNC: 29 U/L (ref 1–40)
AST SERPL-CCNC: 30 U/L (ref 1–40)
AST SERPL-CCNC: 33 U/L (ref 1–40)
AST SERPL-CCNC: 40 U/L (ref 1–40)
AST SERPL-CCNC: 45 U/L (ref 1–40)
AST SERPL-CCNC: 52 U/L (ref 1–40)
AST SERPL-CCNC: 53 U/L (ref 1–40)
BACTERIA SPEC AEROBE CULT: NORMAL
BACTERIA SPEC AEROBE CULT: NORMAL
BASOPHILS # BLD AUTO: 0.02 10*3/MM3 (ref 0–0.2)
BASOPHILS # BLD AUTO: 0.03 10*3/MM3 (ref 0–0.2)
BASOPHILS # BLD AUTO: 0.03 10*3/MM3 (ref 0–0.2)
BASOPHILS # BLD AUTO: 0.04 10*3/MM3 (ref 0–0.2)
BASOPHILS # BLD AUTO: 0.05 10*3/MM3 (ref 0–0.2)
BASOPHILS # BLD AUTO: 0.07 10*3/MM3 (ref 0–0.2)
BASOPHILS NFR BLD AUTO: 0.2 % (ref 0–1.5)
BASOPHILS NFR BLD AUTO: 0.3 % (ref 0–1.5)
BASOPHILS NFR BLD AUTO: 0.3 % (ref 0–1.5)
BASOPHILS NFR BLD AUTO: 0.5 % (ref 0–1.5)
BASOPHILS NFR BLD AUTO: 0.5 % (ref 0–1.5)
BASOPHILS NFR BLD AUTO: 0.7 % (ref 0–1.5)
BH CV ECHO MEAS - AO MAX PG (FULL): 5.5 MMHG
BH CV ECHO MEAS - AO MAX PG (FULL): 8.4 MMHG
BH CV ECHO MEAS - AO MAX PG: 10.5 MMHG
BH CV ECHO MEAS - AO MAX PG: 6.7 MMHG
BH CV ECHO MEAS - AO MEAN PG (FULL): 3 MMHG
BH CV ECHO MEAS - AO MEAN PG (FULL): 5 MMHG
BH CV ECHO MEAS - AO MEAN PG: 4 MMHG
BH CV ECHO MEAS - AO MEAN PG: 6 MMHG
BH CV ECHO MEAS - AO ROOT AREA (BSA CORRECTED): 1.2
BH CV ECHO MEAS - AO ROOT AREA (BSA CORRECTED): 1.3
BH CV ECHO MEAS - AO ROOT AREA: 6.2 CM^2
BH CV ECHO MEAS - AO ROOT AREA: 7.1 CM^2
BH CV ECHO MEAS - AO ROOT DIAM: 2.8 CM
BH CV ECHO MEAS - AO ROOT DIAM: 3 CM
BH CV ECHO MEAS - AO V2 MAX: 129 CM/SEC
BH CV ECHO MEAS - AO V2 MAX: 162 CM/SEC
BH CV ECHO MEAS - AO V2 MEAN: 117 CM/SEC
BH CV ECHO MEAS - AO V2 MEAN: 93.3 CM/SEC
BH CV ECHO MEAS - AO V2 VTI: 23.4 CM
BH CV ECHO MEAS - AO V2 VTI: 33.1 CM
BH CV ECHO MEAS - AVA(I,A): 1.6 CM^2
BH CV ECHO MEAS - AVA(I,A): 2 CM^2
BH CV ECHO MEAS - AVA(I,D): 1.6 CM^2
BH CV ECHO MEAS - AVA(I,D): 2 CM^2
BH CV ECHO MEAS - AVA(V,A): 1.8 CM^2
BH CV ECHO MEAS - AVA(V,A): 1.9 CM^2
BH CV ECHO MEAS - AVA(V,D): 1.8 CM^2
BH CV ECHO MEAS - AVA(V,D): 1.9 CM^2
BH CV ECHO MEAS - BSA(HAYCOCK): 2.3 M^2
BH CV ECHO MEAS - BSA(HAYCOCK): 2.3 M^2
BH CV ECHO MEAS - BSA: 2.3 M^2
BH CV ECHO MEAS - BSA: 2.3 M^2
BH CV ECHO MEAS - BZI_BMI: 26.2 KILOGRAMS/M^2
BH CV ECHO MEAS - BZI_BMI: 26.9 KILOGRAMS/M^2
BH CV ECHO MEAS - BZI_METRIC_HEIGHT: 193 CM
BH CV ECHO MEAS - BZI_METRIC_HEIGHT: 193 CM
BH CV ECHO MEAS - BZI_METRIC_WEIGHT: 100.2 KG
BH CV ECHO MEAS - BZI_METRIC_WEIGHT: 97.5 KG
BH CV ECHO MEAS - EDV(CUBED): 220.3 ML
BH CV ECHO MEAS - EDV(MOD-SP4): 289 ML
BH CV ECHO MEAS - EDV(MOD-SP4): 308 ML
BH CV ECHO MEAS - EDV(TEICH): 182.8 ML
BH CV ECHO MEAS - EF(CUBED): 17.3 %
BH CV ECHO MEAS - EF(MOD-SP4): 22.1 %
BH CV ECHO MEAS - EF(MOD-SP4): 31.5 %
BH CV ECHO MEAS - EF(TEICH): 13.5 %
BH CV ECHO MEAS - ESV(CUBED): 182.3 ML
BH CV ECHO MEAS - ESV(MOD-SP4): 198 ML
BH CV ECHO MEAS - ESV(MOD-SP4): 240 ML
BH CV ECHO MEAS - ESV(TEICH): 158.1 ML
BH CV ECHO MEAS - FS: 6.1 %
BH CV ECHO MEAS - IVS/LVPW: 0.88
BH CV ECHO MEAS - IVSD: 1.1 CM
BH CV ECHO MEAS - LA DIMENSION: 4.7 CM
BH CV ECHO MEAS - LA/AO: 1.7
BH CV ECHO MEAS - LAT PEAK E' VEL: 7 CM/SEC
BH CV ECHO MEAS - LV DIASTOLIC VOL/BSA (35-75): 125 ML/M^2
BH CV ECHO MEAS - LV DIASTOLIC VOL/BSA (35-75): 134.8 ML/M^2
BH CV ECHO MEAS - LV MASS(C)D: 289.6 GRAMS
BH CV ECHO MEAS - LV MASS(C)DI: 126.7 GRAMS/M^2
BH CV ECHO MEAS - LV MAX PG: 1.2 MMHG
BH CV ECHO MEAS - LV MAX PG: 2.1 MMHG
BH CV ECHO MEAS - LV MEAN PG: 1 MMHG
BH CV ECHO MEAS - LV MEAN PG: 1 MMHG
BH CV ECHO MEAS - LV SYSTOLIC VOL/BSA (12-30): 105 ML/M^2
BH CV ECHO MEAS - LV SYSTOLIC VOL/BSA (12-30): 85.7 ML/M^2
BH CV ECHO MEAS - LV V1 MAX: 54.8 CM/SEC
BH CV ECHO MEAS - LV V1 MAX: 73.2 CM/SEC
BH CV ECHO MEAS - LV V1 MEAN: 36.9 CM/SEC
BH CV ECHO MEAS - LV V1 MEAN: 51.9 CM/SEC
BH CV ECHO MEAS - LV V1 VTI: 15.8 CM
BH CV ECHO MEAS - LV V1 VTI: 9.3 CM
BH CV ECHO MEAS - LVIDD: 6 CM
BH CV ECHO MEAS - LVIDS: 5.7 CM
BH CV ECHO MEAS - LVLD AP4: 10.3 CM
BH CV ECHO MEAS - LVLD AP4: 11.1 CM
BH CV ECHO MEAS - LVLS AP4: 10.3 CM
BH CV ECHO MEAS - LVLS AP4: 9.1 CM
BH CV ECHO MEAS - LVOT AREA (M): 4.2 CM^2
BH CV ECHO MEAS - LVOT AREA (M): 4.2 CM^2
BH CV ECHO MEAS - LVOT AREA: 4.2 CM^2
BH CV ECHO MEAS - LVOT AREA: 4.2 CM^2
BH CV ECHO MEAS - LVOT DIAM: 2.3 CM
BH CV ECHO MEAS - LVOT DIAM: 2.3 CM
BH CV ECHO MEAS - LVPWD: 1.2 CM
BH CV ECHO MEAS - MED PEAK E' VEL: 4.9 CM/SEC
BH CV ECHO MEAS - MR MAX PG: 59.6 MMHG
BH CV ECHO MEAS - MR MAX PG: 85 MMHG
BH CV ECHO MEAS - MR MAX VEL: 386 CM/SEC
BH CV ECHO MEAS - MR MAX VEL: 461 CM/SEC
BH CV ECHO MEAS - MR MEAN PG: 39 MMHG
BH CV ECHO MEAS - MR MEAN PG: 54 MMHG
BH CV ECHO MEAS - MR MEAN VEL: 292 CM/SEC
BH CV ECHO MEAS - MR MEAN VEL: 344 CM/SEC
BH CV ECHO MEAS - MR VTI: 132 CM
BH CV ECHO MEAS - MR VTI: 148 CM
BH CV ECHO MEAS - MV DEC TIME: 0.14 SEC
BH CV ECHO MEAS - MV E MAX VEL: 105 CM/SEC
BH CV ECHO MEAS - RAP SYSTOLE: 5 MMHG
BH CV ECHO MEAS - RAP SYSTOLE: 5 MMHG
BH CV ECHO MEAS - RVSP: 51.5 MMHG
BH CV ECHO MEAS - RVSP: 68.7 MMHG
BH CV ECHO MEAS - SI(AO): 101.2 ML/M^2
BH CV ECHO MEAS - SI(AO): 63.1 ML/M^2
BH CV ECHO MEAS - SI(CUBED): 16.7 ML/M^2
BH CV ECHO MEAS - SI(LVOT): 16.9 ML/M^2
BH CV ECHO MEAS - SI(LVOT): 28.4 ML/M^2
BH CV ECHO MEAS - SI(MOD-SP4): 29.8 ML/M^2
BH CV ECHO MEAS - SI(MOD-SP4): 39.4 ML/M^2
BH CV ECHO MEAS - SI(TEICH): 10.8 ML/M^2
BH CV ECHO MEAS - SV(AO): 144.1 ML
BH CV ECHO MEAS - SV(AO): 234 ML
BH CV ECHO MEAS - SV(CUBED): 38.1 ML
BH CV ECHO MEAS - SV(LVOT): 38.5 ML
BH CV ECHO MEAS - SV(LVOT): 65.6 ML
BH CV ECHO MEAS - SV(MOD-SP4): 68 ML
BH CV ECHO MEAS - SV(MOD-SP4): 91 ML
BH CV ECHO MEAS - SV(TEICH): 24.6 ML
BH CV ECHO MEAS - TR MAX VEL: 341 CM/SEC
BH CV ECHO MEAS - TR MAX VEL: 399 CM/SEC
BH CV ECHO MEASUREMENTS AVERAGE E/E' RATIO: 17.65
BH CV NUCLEAR PRIOR STUDY: 3
BH CV STRESS BP STAGE 1: NORMAL
BH CV STRESS COMMENTS STAGE 1: NORMAL
BH CV STRESS DOSE REGADENOSON STAGE 1: 0.4
BH CV STRESS DURATION MIN STAGE 1: 0
BH CV STRESS DURATION SEC STAGE 1: 10
BH CV STRESS HR STAGE 1: 64
BH CV STRESS PROTOCOL 1: NORMAL
BH CV STRESS RECOVERY BP: NORMAL MMHG
BH CV STRESS RECOVERY HR: 63 BPM
BH CV STRESS STAGE 1: 1
BILIRUB SERPL-MCNC: 0.9 MG/DL (ref 0.2–1.2)
BILIRUB SERPL-MCNC: 1 MG/DL (ref 0.2–1.2)
BILIRUB SERPL-MCNC: 1.3 MG/DL (ref 0.2–1.2)
BILIRUB SERPL-MCNC: 1.4 MG/DL (ref 0.2–1.2)
BILIRUB SERPL-MCNC: 1.4 MG/DL (ref 0–1.2)
BILIRUB SERPL-MCNC: 1.5 MG/DL (ref 0.2–1.2)
BILIRUB SERPL-MCNC: 1.6 MG/DL (ref 0.2–1.2)
BLD GP AB SCN SERPL QL: NEGATIVE
BLD GP AB SCN SERPL QL: NEGATIVE
BUN BLD-MCNC: 102 MG/DL (ref 8–23)
BUN BLD-MCNC: 112 MG/DL (ref 8–23)
BUN BLD-MCNC: 115 MG/DL (ref 8–23)
BUN BLD-MCNC: 120 MG/DL (ref 8–23)
BUN BLD-MCNC: 59 MG/DL (ref 8–23)
BUN BLD-MCNC: 60 MG/DL (ref 8–23)
BUN BLD-MCNC: 63 MG/DL (ref 8–23)
BUN BLD-MCNC: 74 MG/DL (ref 8–23)
BUN BLD-MCNC: 79 MG/DL (ref 8–23)
BUN BLD-MCNC: 89 MG/DL (ref 8–23)
BUN BLD-MCNC: 92 MG/DL (ref 8–23)
BUN BLD-MCNC: 94 MG/DL (ref 8–23)
BUN BLD-MCNC: 94 MG/DL (ref 8–23)
BUN BLD-MCNC: 97 MG/DL (ref 8–23)
BUN BLD-MCNC: 99 MG/DL (ref 8–23)
BUN SERPL-MCNC: 23 MG/DL (ref 8–23)
BUN SERPL-MCNC: 35 MG/DL (ref 8–23)
BUN SERPL-MCNC: 35 MG/DL (ref 8–23)
BUN SERPL-MCNC: 50 MG/DL (ref 8–23)
BUN/CREAT SERPL: 23.7 (ref 7–25)
BUN/CREAT SERPL: 25.1 (ref 7–25)
BUN/CREAT SERPL: 25.3 (ref 7–25)
BUN/CREAT SERPL: 25.5 (ref 7–25)
BUN/CREAT SERPL: 25.5 (ref 7–25)
BUN/CREAT SERPL: 25.7 (ref 7–25)
BUN/CREAT SERPL: 26.4 (ref 7–25)
BUN/CREAT SERPL: 26.6 (ref 7–25)
BUN/CREAT SERPL: 26.9 (ref 7–25)
BUN/CREAT SERPL: 26.9 (ref 7–25)
BUN/CREAT SERPL: 27.5 (ref 7–25)
BUN/CREAT SERPL: 27.9 (ref 7–25)
BUN/CREAT SERPL: 28.1 (ref 7–25)
BUN/CREAT SERPL: 30.2 (ref 7–25)
BUN/CREAT SERPL: 30.8 (ref 7–25)
BUN/CREAT SERPL: 4.9 (ref 7–25)
BUN/CREAT SERPL: 6.8 (ref 7–25)
BUN/CREAT SERPL: 7.7 (ref 7–25)
BUN/CREAT SERPL: 8.3 (ref 7–25)
CALCIUM SPEC-SCNC: 8.3 MG/DL (ref 8.6–10.5)
CALCIUM SPEC-SCNC: 8.5 MG/DL (ref 8.6–10.5)
CALCIUM SPEC-SCNC: 8.7 MG/DL (ref 8.6–10.5)
CALCIUM SPEC-SCNC: 8.7 MG/DL (ref 8.6–10.5)
CALCIUM SPEC-SCNC: 8.8 MG/DL (ref 8.6–10.5)
CALCIUM SPEC-SCNC: 8.8 MG/DL (ref 8.6–10.5)
CALCIUM SPEC-SCNC: 8.9 MG/DL (ref 8.6–10.5)
CALCIUM SPEC-SCNC: 8.9 MG/DL (ref 8.6–10.5)
CALCIUM SPEC-SCNC: 9 MG/DL (ref 8.6–10.5)
CALCIUM SPEC-SCNC: 9.1 MG/DL (ref 8.6–10.5)
CALCIUM SPEC-SCNC: 9.2 MG/DL (ref 8.6–10.5)
CALCIUM SPEC-SCNC: 9.3 MG/DL (ref 8.6–10.5)
CALCIUM SPEC-SCNC: 9.3 MG/DL (ref 8.6–10.5)
CHLORIDE SERPL-SCNC: 100 MMOL/L (ref 98–107)
CHLORIDE SERPL-SCNC: 101 MMOL/L (ref 98–107)
CHLORIDE SERPL-SCNC: 102 MMOL/L (ref 98–107)
CHLORIDE SERPL-SCNC: 102 MMOL/L (ref 98–107)
CHLORIDE SERPL-SCNC: 103 MMOL/L (ref 98–107)
CHLORIDE SERPL-SCNC: 92 MMOL/L (ref 98–107)
CHLORIDE SERPL-SCNC: 94 MMOL/L (ref 98–107)
CHLORIDE SERPL-SCNC: 94 MMOL/L (ref 98–107)
CHLORIDE SERPL-SCNC: 96 MMOL/L (ref 98–107)
CHLORIDE SERPL-SCNC: 97 MMOL/L (ref 98–107)
CHLORIDE SERPL-SCNC: 98 MMOL/L (ref 98–107)
CHLORIDE SERPL-SCNC: 98 MMOL/L (ref 98–107)
CHLORIDE SERPL-SCNC: 99 MMOL/L (ref 98–107)
CHOLEST SERPL-MCNC: 91 MG/DL (ref 0–200)
CO2 SERPL-SCNC: 22 MMOL/L (ref 22–29)
CO2 SERPL-SCNC: 23 MMOL/L (ref 22–29)
CO2 SERPL-SCNC: 24 MMOL/L (ref 22–29)
CO2 SERPL-SCNC: 25 MMOL/L (ref 22–29)
CO2 SERPL-SCNC: 26 MMOL/L (ref 22–29)
CO2 SERPL-SCNC: 26 MMOL/L (ref 22–29)
CO2 SERPL-SCNC: 27 MMOL/L (ref 22–29)
CO2 SERPL-SCNC: 27 MMOL/L (ref 22–29)
CO2 SERPL-SCNC: 28 MMOL/L (ref 22–29)
CO2 SERPL-SCNC: 29 MMOL/L (ref 22–29)
CO2 SERPL-SCNC: 30 MMOL/L (ref 22–29)
CO2 SERPL-SCNC: 32 MMOL/L (ref 22–29)
COVID LABCORP PRIORITY: NORMAL
CREAT BLD-MCNC: 2.29 MG/DL (ref 0.76–1.27)
CREAT BLD-MCNC: 2.31 MG/DL (ref 0.76–1.27)
CREAT BLD-MCNC: 2.37 MG/DL (ref 0.76–1.27)
CREAT BLD-MCNC: 2.95 MG/DL (ref 0.76–1.27)
CREAT BLD-MCNC: 3.07 MG/DL (ref 0.76–1.27)
CREAT BLD-MCNC: 3.35 MG/DL (ref 0.76–1.27)
CREAT BLD-MCNC: 3.42 MG/DL (ref 0.76–1.27)
CREAT BLD-MCNC: 3.49 MG/DL (ref 0.76–1.27)
CREAT BLD-MCNC: 3.5 MG/DL (ref 0.76–1.27)
CREAT BLD-MCNC: 3.65 MG/DL (ref 0.76–1.27)
CREAT BLD-MCNC: 3.67 MG/DL (ref 0.76–1.27)
CREAT BLD-MCNC: 3.81 MG/DL (ref 0.76–1.27)
CREAT BLD-MCNC: 3.89 MG/DL (ref 0.76–1.27)
CREAT BLD-MCNC: 4.18 MG/DL (ref 0.76–1.27)
CREAT BLD-MCNC: 4.21 MG/DL (ref 0.76–1.27)
CREAT SERPL-MCNC: 4.54 MG/DL (ref 0.76–1.27)
CREAT SERPL-MCNC: 4.72 MG/DL (ref 0.76–1.27)
CREAT SERPL-MCNC: 5.14 MG/DL (ref 0.76–1.27)
CREAT SERPL-MCNC: 6.05 MG/DL (ref 0.76–1.27)
CREAT UR-MCNC: 83.6 MG/DL
D DIMER PPP FEU-MCNC: 1.84 MG/L (FEU) (ref 0–0.5)
DEPRECATED RDW RBC AUTO: 68.5 FL (ref 37–54)
DEPRECATED RDW RBC AUTO: 69.7 FL (ref 37–54)
DEPRECATED RDW RBC AUTO: 70.4 FL (ref 37–54)
DEPRECATED RDW RBC AUTO: 70.8 FL (ref 37–54)
DEPRECATED RDW RBC AUTO: 71.9 FL (ref 37–54)
DEPRECATED RDW RBC AUTO: 72.1 FL (ref 37–54)
DEPRECATED RDW RBC AUTO: 78.3 FL (ref 37–54)
DEPRECATED RDW RBC AUTO: 78.3 FL (ref 37–54)
DEPRECATED RDW RBC AUTO: 79.7 FL (ref 37–54)
DEPRECATED RDW RBC AUTO: 81.8 FL (ref 37–54)
EOSINOPHIL # BLD AUTO: 0.12 10*3/MM3 (ref 0–0.4)
EOSINOPHIL # BLD AUTO: 0.17 10*3/MM3 (ref 0–0.4)
EOSINOPHIL # BLD AUTO: 0.29 10*3/MM3 (ref 0–0.4)
EOSINOPHIL # BLD AUTO: 0.33 10*3/MM3 (ref 0–0.4)
EOSINOPHIL # BLD AUTO: 0.4 10*3/MM3 (ref 0–0.4)
EOSINOPHIL # BLD AUTO: 0.41 10*3/MM3 (ref 0–0.4)
EOSINOPHIL NFR BLD AUTO: 1.2 % (ref 0.3–6.2)
EOSINOPHIL NFR BLD AUTO: 1.8 % (ref 0.3–6.2)
EOSINOPHIL NFR BLD AUTO: 3.3 % (ref 0.3–6.2)
EOSINOPHIL NFR BLD AUTO: 3.8 % (ref 0.3–6.2)
EOSINOPHIL NFR BLD AUTO: 4 % (ref 0.3–6.2)
EOSINOPHIL NFR BLD AUTO: 5.2 % (ref 0.3–6.2)
ERYTHROCYTE [DISTWIDTH] IN BLOOD BY AUTOMATED COUNT: 20.3 % (ref 12.3–15.4)
ERYTHROCYTE [DISTWIDTH] IN BLOOD BY AUTOMATED COUNT: 20.4 % (ref 12.3–15.4)
ERYTHROCYTE [DISTWIDTH] IN BLOOD BY AUTOMATED COUNT: 20.6 % (ref 12.3–15.4)
ERYTHROCYTE [DISTWIDTH] IN BLOOD BY AUTOMATED COUNT: 20.6 % (ref 12.3–15.4)
ERYTHROCYTE [DISTWIDTH] IN BLOOD BY AUTOMATED COUNT: 20.7 % (ref 12.3–15.4)
ERYTHROCYTE [DISTWIDTH] IN BLOOD BY AUTOMATED COUNT: 21 % (ref 12.3–15.4)
ERYTHROCYTE [DISTWIDTH] IN BLOOD BY AUTOMATED COUNT: 21.2 % (ref 12.3–15.4)
ERYTHROCYTE [DISTWIDTH] IN BLOOD BY AUTOMATED COUNT: 21.2 % (ref 12.3–15.4)
ERYTHROCYTE [DISTWIDTH] IN BLOOD BY AUTOMATED COUNT: 21.7 % (ref 12.3–15.4)
ERYTHROCYTE [DISTWIDTH] IN BLOOD BY AUTOMATED COUNT: 22.6 % (ref 12.3–15.4)
GFR SERPL CREATININE-BSD FRML MDRD: 11 ML/MIN/1.73
GFR SERPL CREATININE-BSD FRML MDRD: 12 ML/MIN/1.73
GFR SERPL CREATININE-BSD FRML MDRD: 13 ML/MIN/1.73
GFR SERPL CREATININE-BSD FRML MDRD: 14 ML/MIN/1.73
GFR SERPL CREATININE-BSD FRML MDRD: 14 ML/MIN/1.73
GFR SERPL CREATININE-BSD FRML MDRD: 15 ML/MIN/1.73
GFR SERPL CREATININE-BSD FRML MDRD: 16 ML/MIN/1.73
GFR SERPL CREATININE-BSD FRML MDRD: 17 ML/MIN/1.73
GFR SERPL CREATININE-BSD FRML MDRD: 18 ML/MIN/1.73
GFR SERPL CREATININE-BSD FRML MDRD: 20 ML/MIN/1.73
GFR SERPL CREATININE-BSD FRML MDRD: 21 ML/MIN/1.73
GFR SERPL CREATININE-BSD FRML MDRD: 27 ML/MIN/1.73
GFR SERPL CREATININE-BSD FRML MDRD: 28 ML/MIN/1.73
GFR SERPL CREATININE-BSD FRML MDRD: 28 ML/MIN/1.73
GFR SERPL CREATININE-BSD FRML MDRD: 9 ML/MIN/1.73
GFR SERPL CREATININE-BSD FRML MDRD: ABNORMAL ML/MIN/{1.73_M2}
GLOBULIN UR ELPH-MCNC: 2.1 GM/DL
GLOBULIN UR ELPH-MCNC: 2.1 GM/DL
GLOBULIN UR ELPH-MCNC: 2.2 GM/DL
GLOBULIN UR ELPH-MCNC: 2.2 GM/DL
GLOBULIN UR ELPH-MCNC: 2.3 GM/DL
GLOBULIN UR ELPH-MCNC: 2.4 GM/DL
GLOBULIN UR ELPH-MCNC: 2.4 GM/DL
GLOBULIN UR ELPH-MCNC: 2.5 GM/DL
GLOBULIN UR ELPH-MCNC: 2.5 GM/DL
GLUCOSE BLD-MCNC: 101 MG/DL (ref 65–99)
GLUCOSE BLD-MCNC: 102 MG/DL (ref 65–99)
GLUCOSE BLD-MCNC: 103 MG/DL (ref 65–99)
GLUCOSE BLD-MCNC: 105 MG/DL (ref 65–99)
GLUCOSE BLD-MCNC: 106 MG/DL (ref 65–99)
GLUCOSE BLD-MCNC: 109 MG/DL (ref 65–99)
GLUCOSE BLD-MCNC: 111 MG/DL (ref 65–99)
GLUCOSE BLD-MCNC: 113 MG/DL (ref 65–99)
GLUCOSE BLD-MCNC: 114 MG/DL (ref 65–99)
GLUCOSE BLD-MCNC: 125 MG/DL (ref 65–99)
GLUCOSE BLD-MCNC: 136 MG/DL (ref 65–99)
GLUCOSE BLD-MCNC: 137 MG/DL (ref 65–99)
GLUCOSE BLD-MCNC: 142 MG/DL (ref 65–99)
GLUCOSE BLD-MCNC: 92 MG/DL (ref 65–99)
GLUCOSE BLD-MCNC: 98 MG/DL (ref 65–99)
GLUCOSE SERPL-MCNC: 118 MG/DL (ref 65–99)
GLUCOSE SERPL-MCNC: 121 MG/DL (ref 65–99)
GLUCOSE SERPL-MCNC: 213 MG/DL (ref 65–99)
GLUCOSE SERPL-MCNC: 89 MG/DL (ref 65–99)
HAV IGM SERPL QL IA: NORMAL
HBA1C MFR BLD: 6.4 % (ref 4.8–5.6)
HBV CORE IGM SERPL QL IA: NORMAL
HBV CORE IGM SERPL QL IA: NORMAL
HBV SURFACE AB SER RIA-ACNC: REACTIVE
HBV SURFACE AG SERPL QL IA: NORMAL
HBV SURFACE AG SERPL QL IA: NORMAL
HCT VFR BLD AUTO: 25 % (ref 37.5–51)
HCT VFR BLD AUTO: 25.1 % (ref 37.5–51)
HCT VFR BLD AUTO: 25.7 % (ref 37.5–51)
HCT VFR BLD AUTO: 27.5 % (ref 37.5–51)
HCT VFR BLD AUTO: 27.6 % (ref 37.5–51)
HCT VFR BLD AUTO: 28.2 % (ref 37.5–51)
HCT VFR BLD AUTO: 28.9 % (ref 37.5–51)
HCT VFR BLD AUTO: 29.7 % (ref 37.5–51)
HCT VFR BLD AUTO: 33 % (ref 37.5–51)
HCT VFR BLD AUTO: 33.7 % (ref 37.5–51)
HCV AB SER DONR QL: NORMAL
HDLC SERPL-MCNC: 38 MG/DL (ref 40–60)
HGB BLD-MCNC: 10.5 G/DL (ref 13–17.7)
HGB BLD-MCNC: 11.1 G/DL (ref 13–17.7)
HGB BLD-MCNC: 8.2 G/DL (ref 13–17.7)
HGB BLD-MCNC: 8.3 G/DL (ref 13–17.7)
HGB BLD-MCNC: 8.6 G/DL (ref 13–17.7)
HGB BLD-MCNC: 9.1 G/DL (ref 13–17.7)
HGB BLD-MCNC: 9.3 G/DL (ref 13–17.7)
HGB BLD-MCNC: 9.3 G/DL (ref 13–17.7)
HGB BLD-MCNC: 9.4 G/DL (ref 13–17.7)
HGB BLD-MCNC: 9.4 G/DL (ref 13–17.7)
HOLD SPECIMEN: NORMAL
IMM GRANULOCYTES # BLD AUTO: 0.03 10*3/MM3 (ref 0–0.05)
IMM GRANULOCYTES # BLD AUTO: 0.08 10*3/MM3 (ref 0–0.05)
IMM GRANULOCYTES NFR BLD AUTO: 0.3 % (ref 0–0.5)
IMM GRANULOCYTES NFR BLD AUTO: 0.8 % (ref 0–0.5)
INR PPP: 1.18 (ref 0.91–1.09)
INR PPP: 1.26 (ref 0.91–1.09)
IRON 24H UR-MRATE: 90 MCG/DL (ref 59–158)
IRON SATN MFR SERPL: 21 % (ref 20–50)
LDLC SERPL CALC-MCNC: 34 MG/DL (ref 0–100)
LDLC/HDLC SERPL: 0.91 {RATIO}
LV EF 2D ECHO EST: 20 %
LV EF NUC BP: 21 %
LYMPHOCYTES # BLD AUTO: 1.3 10*3/MM3 (ref 0.7–3.1)
LYMPHOCYTES # BLD AUTO: 1.31 10*3/MM3 (ref 0.7–3.1)
LYMPHOCYTES # BLD AUTO: 1.38 10*3/MM3 (ref 0.7–3.1)
LYMPHOCYTES # BLD AUTO: 1.45 10*3/MM3 (ref 0.7–3.1)
LYMPHOCYTES # BLD AUTO: 1.45 10*3/MM3 (ref 0.7–3.1)
LYMPHOCYTES # BLD AUTO: 1.67 10*3/MM3 (ref 0.7–3.1)
LYMPHOCYTES NFR BLD AUTO: 12.5 % (ref 19.6–45.3)
LYMPHOCYTES NFR BLD AUTO: 13.6 % (ref 19.6–45.3)
LYMPHOCYTES NFR BLD AUTO: 14.2 % (ref 19.6–45.3)
LYMPHOCYTES NFR BLD AUTO: 15.7 % (ref 19.6–45.3)
LYMPHOCYTES NFR BLD AUTO: 16.5 % (ref 19.6–45.3)
LYMPHOCYTES NFR BLD AUTO: 21.9 % (ref 19.6–45.3)
MAGNESIUM SERPL-MCNC: 2 MG/DL (ref 1.6–2.4)
MAGNESIUM SERPL-MCNC: 2.1 MG/DL (ref 1.6–2.4)
MAGNESIUM SERPL-MCNC: 2.1 MG/DL (ref 1.6–2.4)
MAGNESIUM SERPL-MCNC: 2.2 MG/DL (ref 1.6–2.4)
MAGNESIUM SERPL-MCNC: 2.3 MG/DL (ref 1.6–2.4)
MAXIMAL PREDICTED HEART RATE: 151 BPM
MAXIMAL PREDICTED HEART RATE: 151 BPM
MCH RBC QN AUTO: 30.5 PG (ref 26.6–33)
MCH RBC QN AUTO: 30.5 PG (ref 26.6–33)
MCH RBC QN AUTO: 30.7 PG (ref 26.6–33)
MCH RBC QN AUTO: 31 PG (ref 26.6–33)
MCH RBC QN AUTO: 31.1 PG (ref 26.6–33)
MCH RBC QN AUTO: 31.3 PG (ref 26.6–33)
MCH RBC QN AUTO: 32.6 PG (ref 26.6–33)
MCH RBC QN AUTO: 33 PG (ref 26.6–33)
MCH RBC QN AUTO: 33.5 PG (ref 26.6–33)
MCH RBC QN AUTO: 33.7 PG (ref 26.6–33)
MCHC RBC AUTO-ENTMCNC: 31.6 G/DL (ref 31.5–35.7)
MCHC RBC AUTO-ENTMCNC: 31.8 G/DL (ref 31.5–35.7)
MCHC RBC AUTO-ENTMCNC: 32.2 G/DL (ref 31.5–35.7)
MCHC RBC AUTO-ENTMCNC: 32.7 G/DL (ref 31.5–35.7)
MCHC RBC AUTO-ENTMCNC: 32.9 G/DL (ref 31.5–35.7)
MCHC RBC AUTO-ENTMCNC: 33.1 G/DL (ref 31.5–35.7)
MCHC RBC AUTO-ENTMCNC: 33.2 G/DL (ref 31.5–35.7)
MCHC RBC AUTO-ENTMCNC: 33.3 G/DL (ref 31.5–35.7)
MCHC RBC AUTO-ENTMCNC: 33.5 G/DL (ref 31.5–35.7)
MCHC RBC AUTO-ENTMCNC: 33.7 G/DL (ref 31.5–35.7)
MCV RBC AUTO: 103.8 FL (ref 79–97)
MCV RBC AUTO: 104 FL (ref 79–97)
MCV RBC AUTO: 106.5 FL (ref 79–97)
MCV RBC AUTO: 91.8 FL (ref 79–97)
MCV RBC AUTO: 91.9 FL (ref 79–97)
MCV RBC AUTO: 92.9 FL (ref 79–97)
MCV RBC AUTO: 93.3 FL (ref 79–97)
MCV RBC AUTO: 93.4 FL (ref 79–97)
MCV RBC AUTO: 94.1 FL (ref 79–97)
MCV RBC AUTO: 98.6 FL (ref 79–97)
MONOCYTES # BLD AUTO: 0.91 10*3/MM3 (ref 0.1–0.9)
MONOCYTES # BLD AUTO: 0.96 10*3/MM3 (ref 0.1–0.9)
MONOCYTES # BLD AUTO: 1.1 10*3/MM3 (ref 0.1–0.9)
MONOCYTES # BLD AUTO: 1.11 10*3/MM3 (ref 0.1–0.9)
MONOCYTES # BLD AUTO: 1.17 10*3/MM3 (ref 0.1–0.9)
MONOCYTES # BLD AUTO: 1.18 10*3/MM3 (ref 0.1–0.9)
MONOCYTES NFR BLD AUTO: 12.2 % (ref 5–12)
MONOCYTES NFR BLD AUTO: 12.5 % (ref 5–12)
MONOCYTES NFR BLD AUTO: 13.4 % (ref 5–12)
MONOCYTES NFR BLD AUTO: 14.5 % (ref 5–12)
MONOCYTES NFR BLD AUTO: 8.8 % (ref 5–12)
MONOCYTES NFR BLD AUTO: 9.4 % (ref 5–12)
NEUTROPHILS # BLD AUTO: 4.4 10*3/MM3 (ref 1.7–7)
NEUTROPHILS # BLD AUTO: 5.86 10*3/MM3 (ref 1.7–7)
NEUTROPHILS # BLD AUTO: 5.88 10*3/MM3 (ref 1.7–7)
NEUTROPHILS # BLD AUTO: 7.27 10*3/MM3 (ref 1.7–7)
NEUTROPHILS NFR BLD AUTO: 57.6 % (ref 42.7–76)
NEUTROPHILS NFR BLD AUTO: 6.87 10*3/MM3 (ref 1.7–7)
NEUTROPHILS NFR BLD AUTO: 66.6 % (ref 42.7–76)
NEUTROPHILS NFR BLD AUTO: 66.9 % (ref 42.7–76)
NEUTROPHILS NFR BLD AUTO: 7.92 10*3/MM3 (ref 1.7–7)
NEUTROPHILS NFR BLD AUTO: 71.1 % (ref 42.7–76)
NEUTROPHILS NFR BLD AUTO: 71.4 % (ref 42.7–76)
NEUTROPHILS NFR BLD AUTO: 76.2 % (ref 42.7–76)
NRBC BLD AUTO-RTO: 0 /100 WBC (ref 0–0.2)
NRBC BLD AUTO-RTO: 0.4 /100 WBC (ref 0–0.2)
NT-PROBNP SERPL-MCNC: ABNORMAL PG/ML (ref 5–900)
PERCENT MAX PREDICTED HR: 42.38 %
PHOSPHATE SERPL-MCNC: 3.8 MG/DL (ref 2.5–4.5)
PHOSPHATE SERPL-MCNC: 3.9 MG/DL (ref 2.5–4.5)
PHOSPHATE SERPL-MCNC: 4.3 MG/DL (ref 2.5–4.5)
PHOSPHATE SERPL-MCNC: 4.4 MG/DL (ref 2.5–4.5)
PHOSPHATE SERPL-MCNC: 4.5 MG/DL (ref 2.5–4.5)
PHOSPHATE SERPL-MCNC: 5.2 MG/DL (ref 2.5–4.5)
PHOSPHATE SERPL-MCNC: 5.3 MG/DL (ref 2.5–4.5)
PHOSPHATE SERPL-MCNC: 5.4 MG/DL (ref 2.5–4.5)
PLATELET # BLD AUTO: 218 10*3/MM3 (ref 140–450)
PLATELET # BLD AUTO: 219 10*3/MM3 (ref 140–450)
PLATELET # BLD AUTO: 230 10*3/MM3 (ref 140–450)
PLATELET # BLD AUTO: 240 10*3/MM3 (ref 140–450)
PLATELET # BLD AUTO: 246 10*3/MM3 (ref 140–450)
PLATELET # BLD AUTO: 251 10*3/MM3 (ref 140–450)
PLATELET # BLD AUTO: 263 10*3/MM3 (ref 140–450)
PLATELET # BLD AUTO: 269 10*3/MM3 (ref 140–450)
PLATELET # BLD AUTO: 280 10*3/MM3 (ref 140–450)
PLATELET # BLD AUTO: 304 10*3/MM3 (ref 140–450)
PMV BLD AUTO: 12.5 FL (ref 6–12)
PMV BLD AUTO: 12.6 FL (ref 6–12)
PMV BLD AUTO: 12.8 FL (ref 6–12)
PMV BLD AUTO: 13 FL (ref 6–12)
PMV BLD AUTO: 13.4 FL (ref 6–12)
PMV BLD AUTO: 14 FL (ref 6–12)
PMV BLD AUTO: 14.1 FL (ref 6–12)
PMV BLD AUTO: 14.5 FL (ref 6–12)
PMV BLD AUTO: 14.6 FL (ref 6–12)
PMV BLD AUTO: 14.7 FL (ref 6–12)
POTASSIUM BLD-SCNC: 2.9 MMOL/L (ref 3.5–5.2)
POTASSIUM BLD-SCNC: 3.1 MMOL/L (ref 3.5–5.2)
POTASSIUM BLD-SCNC: 3.2 MMOL/L (ref 3.5–5.2)
POTASSIUM BLD-SCNC: 3.2 MMOL/L (ref 3.5–5.2)
POTASSIUM BLD-SCNC: 3.4 MMOL/L (ref 3.5–5.2)
POTASSIUM BLD-SCNC: 3.6 MMOL/L (ref 3.5–5.2)
POTASSIUM BLD-SCNC: 3.9 MMOL/L (ref 3.5–5.2)
POTASSIUM BLD-SCNC: 4.3 MMOL/L (ref 3.5–5.2)
POTASSIUM BLD-SCNC: 4.3 MMOL/L (ref 3.5–5.2)
POTASSIUM BLD-SCNC: 4.5 MMOL/L (ref 3.5–5.2)
POTASSIUM BLD-SCNC: 4.5 MMOL/L (ref 3.5–5.2)
POTASSIUM SERPL-SCNC: 3.7 MMOL/L (ref 3.5–5.2)
POTASSIUM SERPL-SCNC: 4 MMOL/L (ref 3.5–5.2)
POTASSIUM SERPL-SCNC: 4.2 MMOL/L (ref 3.5–5.2)
POTASSIUM SERPL-SCNC: 4.6 MMOL/L (ref 3.5–5.2)
PROT SERPL-MCNC: 5.7 G/DL (ref 6–8.5)
PROT SERPL-MCNC: 5.7 G/DL (ref 6–8.5)
PROT SERPL-MCNC: 5.8 G/DL (ref 6–8.5)
PROT SERPL-MCNC: 5.8 G/DL (ref 6–8.5)
PROT SERPL-MCNC: 5.9 G/DL (ref 6–8.5)
PROT SERPL-MCNC: 6.3 G/DL (ref 6–8.5)
PROT SERPL-MCNC: 6.4 G/DL (ref 6–8.5)
PROT SERPL-MCNC: 6.5 G/DL (ref 6–8.5)
PROT SERPL-MCNC: 6.7 G/DL (ref 6–8.5)
PROT UR-MCNC: 8.1 MG/DL
PROTHROMBIN TIME: 15.4 SECONDS (ref 11.9–14.6)
PROTHROMBIN TIME: 15.4 SECONDS (ref 11.9–14.6)
PTH-INTACT SERPL-MCNC: 116.5 PG/ML (ref 15–65)
RBC # BLD AUTO: 2.69 10*6/MM3 (ref 4.14–5.8)
RBC # BLD AUTO: 2.72 10*6/MM3 (ref 4.14–5.8)
RBC # BLD AUTO: 2.78 10*6/MM3 (ref 4.14–5.8)
RBC # BLD AUTO: 2.79 10*6/MM3 (ref 4.14–5.8)
RBC # BLD AUTO: 2.79 10*6/MM3 (ref 4.14–5.8)
RBC # BLD AUTO: 2.8 10*6/MM3 (ref 4.14–5.8)
RBC # BLD AUTO: 2.97 10*6/MM3 (ref 4.14–5.8)
RBC # BLD AUTO: 3.02 10*6/MM3 (ref 4.14–5.8)
RBC # BLD AUTO: 3.18 10*6/MM3 (ref 4.14–5.8)
RBC # BLD AUTO: 3.58 10*6/MM3 (ref 4.14–5.8)
RH BLD: POSITIVE
RH BLD: POSITIVE
SARS-COV-2 RDRP RESP QL NAA+PROBE: NOT DETECTED
SARS-COV-2 RNA RESP QL NAA+PROBE: NOT DETECTED
SODIUM BLD-SCNC: 131 MMOL/L (ref 136–145)
SODIUM BLD-SCNC: 134 MMOL/L (ref 136–145)
SODIUM BLD-SCNC: 135 MMOL/L (ref 136–145)
SODIUM BLD-SCNC: 135 MMOL/L (ref 136–145)
SODIUM BLD-SCNC: 136 MMOL/L (ref 136–145)
SODIUM BLD-SCNC: 138 MMOL/L (ref 136–145)
SODIUM BLD-SCNC: 139 MMOL/L (ref 136–145)
SODIUM BLD-SCNC: 140 MMOL/L (ref 136–145)
SODIUM BLD-SCNC: 140 MMOL/L (ref 136–145)
SODIUM BLD-SCNC: 141 MMOL/L (ref 136–145)
SODIUM SERPL-SCNC: 133 MMOL/L (ref 136–145)
SODIUM SERPL-SCNC: 134 MMOL/L (ref 136–145)
SODIUM SERPL-SCNC: 134 MMOL/L (ref 136–145)
SODIUM SERPL-SCNC: 138 MMOL/L (ref 136–145)
SODIUM UR-SCNC: 42 MMOL/L
STRESS BASELINE BP: NORMAL MMHG
STRESS BASELINE HR: 64 BPM
STRESS PERCENT HR: 50 %
STRESS POST EXERCISE DUR MIN: 0 MIN
STRESS POST EXERCISE DUR SEC: 10 SEC
STRESS POST PEAK BP: NORMAL MMHG
STRESS POST PEAK HR: 64 BPM
STRESS TARGET HR: 128 BPM
STRESS TARGET HR: 128 BPM
T&S EXPIRATION DATE: NORMAL
T&S EXPIRATION DATE: NORMAL
TIBC SERPL-MCNC: 431 MCG/DL (ref 298–536)
TRANSFERRIN SERPL-MCNC: 289 MG/DL (ref 200–360)
TRIGL SERPL-MCNC: 93 MG/DL (ref 0–150)
TROPONIN T SERPL-MCNC: 0.03 NG/ML (ref 0–0.03)
TROPONIN T SERPL-MCNC: 0.04 NG/ML (ref 0–0.03)
TROPONIN T SERPL-MCNC: 0.04 NG/ML (ref 0–0.03)
TROPONIN T SERPL-MCNC: 0.05 NG/ML (ref 0–0.03)
TROPONIN T SERPL-MCNC: 0.14 NG/ML (ref 0–0.03)
TROPONIN T SERPL-MCNC: 0.15 NG/ML (ref 0–0.03)
TROPONIN T SERPL-MCNC: 0.15 NG/ML (ref 0–0.03)
TROPONIN T SERPL-MCNC: 0.18 NG/ML (ref 0–0.03)
TSH SERPL DL<=0.05 MIU/L-ACNC: 13.48 UIU/ML (ref 0.27–4.2)
TSH SERPL DL<=0.05 MIU/L-ACNC: 2.99 UIU/ML (ref 0.27–4.2)
URATE SERPL-MCNC: 7.2 MG/DL (ref 3.4–7)
UUN 24H UR-MCNC: 532 MG/DL
VANCOMYCIN SERPL-MCNC: 15.7 MCG/ML (ref 5–40)
VLDLC SERPL-MCNC: 18.6 MG/DL
WBC # BLD AUTO: 10.38 10*3/MM3 (ref 3.4–10.8)
WBC # BLD AUTO: 9.24 10*3/MM3 (ref 3.4–10.8)
WBC # BLD AUTO: 9.62 10*3/MM3 (ref 3.4–10.8)
WBC NRBC COR # BLD: 10.22 10*3/MM3 (ref 3.4–10.8)
WBC NRBC COR # BLD: 7.64 10*3/MM3 (ref 3.4–10.8)
WBC NRBC COR # BLD: 8.51 10*3/MM3 (ref 3.4–10.8)
WBC NRBC COR # BLD: 8.52 10*3/MM3 (ref 3.4–10.8)
WBC NRBC COR # BLD: 8.59 10*3/MM3 (ref 3.4–10.8)
WBC NRBC COR # BLD: 8.79 10*3/MM3 (ref 3.4–10.8)
WBC NRBC COR # BLD: 8.8 10*3/MM3 (ref 3.4–10.8)
WHOLE BLOOD HOLD SPECIMEN: NORMAL

## 2020-01-01 PROCEDURE — 02HK3KZ INSERTION OF DEFIBRILLATOR LEAD INTO RIGHT VENTRICLE, PERCUTANEOUS APPROACH: ICD-10-PCS | Performed by: INTERNAL MEDICINE

## 2020-01-01 PROCEDURE — 33249 INSJ/RPLCMT DEFIB W/LEAD(S): CPT | Performed by: INTERNAL MEDICINE

## 2020-01-01 PROCEDURE — 83735 ASSAY OF MAGNESIUM: CPT | Performed by: INTERNAL MEDICINE

## 2020-01-01 PROCEDURE — 80048 BASIC METABOLIC PNL TOTAL CA: CPT | Performed by: INTERNAL MEDICINE

## 2020-01-01 PROCEDURE — C1898 LEAD, PMKR, OTHER THAN TRANS: HCPCS | Performed by: INTERNAL MEDICINE

## 2020-01-01 PROCEDURE — 83880 ASSAY OF NATRIURETIC PEPTIDE: CPT | Performed by: FAMILY MEDICINE

## 2020-01-01 PROCEDURE — 71275 CT ANGIOGRAPHY CHEST: CPT

## 2020-01-01 PROCEDURE — 93000 ELECTROCARDIOGRAM COMPLETE: CPT | Performed by: NURSE PRACTITIONER

## 2020-01-01 PROCEDURE — 99214 OFFICE O/P EST MOD 30 MIN: CPT | Performed by: NURSE PRACTITIONER

## 2020-01-01 PROCEDURE — 25010000002 ENOXAPARIN PER 10 MG: Performed by: FAMILY MEDICINE

## 2020-01-01 PROCEDURE — 85027 COMPLETE CBC AUTOMATED: CPT | Performed by: NURSE PRACTITIONER

## 2020-01-01 PROCEDURE — 36561 INSERT TUNNELED CV CATH: CPT | Performed by: SURGERY

## 2020-01-01 PROCEDURE — 36818 AV FUSE UPPR ARM CEPHALIC: CPT | Performed by: SURGERY

## 2020-01-01 PROCEDURE — 84100 ASSAY OF PHOSPHORUS: CPT | Performed by: INTERNAL MEDICINE

## 2020-01-01 PROCEDURE — C1892 INTRO/SHEATH,FIXED,PEEL-AWAY: HCPCS | Performed by: INTERNAL MEDICINE

## 2020-01-01 PROCEDURE — 93010 ELECTROCARDIOGRAM REPORT: CPT | Performed by: INTERNAL MEDICINE

## 2020-01-01 PROCEDURE — 25010000002 FUROSEMIDE PER 20 MG: Performed by: NURSE PRACTITIONER

## 2020-01-01 PROCEDURE — 36415 COLL VENOUS BLD VENIPUNCTURE: CPT

## 2020-01-01 PROCEDURE — 99221 1ST HOSP IP/OBS SF/LOW 40: CPT | Performed by: NURSE PRACTITIONER

## 2020-01-01 PROCEDURE — 85610 PROTHROMBIN TIME: CPT | Performed by: INTERNAL MEDICINE

## 2020-01-01 PROCEDURE — 84550 ASSAY OF BLOOD/URIC ACID: CPT | Performed by: NURSE PRACTITIONER

## 2020-01-01 PROCEDURE — 85027 COMPLETE CBC AUTOMATED: CPT | Performed by: INTERNAL MEDICINE

## 2020-01-01 PROCEDURE — 93308 TTE F-UP OR LMTD: CPT | Performed by: INTERNAL MEDICINE

## 2020-01-01 PROCEDURE — 80048 BASIC METABOLIC PNL TOTAL CA: CPT | Performed by: SURGERY

## 2020-01-01 PROCEDURE — 25010000002 IRON SUCROSE PER 1 MG: Performed by: INTERNAL MEDICINE

## 2020-01-01 PROCEDURE — 25010000002 FENTANYL CITRATE (PF) 100 MCG/2ML SOLUTION: Performed by: ANESTHESIOLOGY

## 2020-01-01 PROCEDURE — 02PA3MZ REMOVAL OF CARDIAC LEAD FROM HEART, PERCUTANEOUS APPROACH: ICD-10-PCS | Performed by: INTERNAL MEDICINE

## 2020-01-01 PROCEDURE — 93017 CV STRESS TEST TRACING ONLY: CPT

## 2020-01-01 PROCEDURE — 80074 ACUTE HEPATITIS PANEL: CPT | Performed by: INTERNAL MEDICINE

## 2020-01-01 PROCEDURE — C1750 CATH, HEMODIALYSIS,LONG-TERM: HCPCS | Performed by: SURGERY

## 2020-01-01 PROCEDURE — 93970 EXTREMITY STUDY: CPT | Performed by: SURGERY

## 2020-01-01 PROCEDURE — 76000 FLUOROSCOPY <1 HR PHYS/QHP: CPT

## 2020-01-01 PROCEDURE — 84466 ASSAY OF TRANSFERRIN: CPT | Performed by: INTERNAL MEDICINE

## 2020-01-01 PROCEDURE — 93308 TTE F-UP OR LMTD: CPT

## 2020-01-01 PROCEDURE — 86901 BLOOD TYPING SEROLOGIC RH(D): CPT | Performed by: NURSE PRACTITIONER

## 2020-01-01 PROCEDURE — 77001 FLUOROGUIDE FOR VEIN DEVICE: CPT | Performed by: SURGERY

## 2020-01-01 PROCEDURE — 85027 COMPLETE CBC AUTOMATED: CPT | Performed by: SURGERY

## 2020-01-01 PROCEDURE — 99232 SBSQ HOSP IP/OBS MODERATE 35: CPT | Performed by: INTERNAL MEDICINE

## 2020-01-01 PROCEDURE — 25010000002 REGADENOSON 0.4 MG/5ML SOLUTION: Performed by: INTERNAL MEDICINE

## 2020-01-01 PROCEDURE — 99152 MOD SED SAME PHYS/QHP 5/>YRS: CPT | Performed by: INTERNAL MEDICINE

## 2020-01-01 PROCEDURE — 99213 OFFICE O/P EST LOW 20 MIN: CPT | Performed by: NURSE PRACTITIONER

## 2020-01-01 PROCEDURE — 83735 ASSAY OF MAGNESIUM: CPT | Performed by: SURGERY

## 2020-01-01 PROCEDURE — 84484 ASSAY OF TROPONIN QUANT: CPT | Performed by: INTERNAL MEDICINE

## 2020-01-01 PROCEDURE — 93923 UPR/LXTR ART STDY 3+ LVLS: CPT | Performed by: SURGERY

## 2020-01-01 PROCEDURE — 80202 ASSAY OF VANCOMYCIN: CPT | Performed by: PHYSICIAN ASSISTANT

## 2020-01-01 PROCEDURE — 99232 SBSQ HOSP IP/OBS MODERATE 35: CPT | Performed by: NURSE PRACTITIONER

## 2020-01-01 PROCEDURE — 99223 1ST HOSP IP/OBS HIGH 75: CPT | Performed by: INTERNAL MEDICINE

## 2020-01-01 PROCEDURE — 93297 REM INTERROG DEV EVAL ICPMS: CPT | Performed by: INTERNAL MEDICINE

## 2020-01-01 PROCEDURE — 02H43KZ INSERTION OF DEFIBRILLATOR LEAD INTO CORONARY VEIN, PERCUTANEOUS APPROACH: ICD-10-PCS | Performed by: INTERNAL MEDICINE

## 2020-01-01 PROCEDURE — 99024 POSTOP FOLLOW-UP VISIT: CPT | Performed by: INTERNAL MEDICINE

## 2020-01-01 PROCEDURE — 93971 EXTREMITY STUDY: CPT | Performed by: SURGERY

## 2020-01-01 PROCEDURE — 86900 BLOOD TYPING SEROLOGIC ABO: CPT | Performed by: NURSE PRACTITIONER

## 2020-01-01 PROCEDURE — 86705 HEP B CORE ANTIBODY IGM: CPT | Performed by: INTERNAL MEDICINE

## 2020-01-01 PROCEDURE — 06H033Z INSERTION OF INFUSION DEVICE INTO INFERIOR VENA CAVA, PERCUTANEOUS APPROACH: ICD-10-PCS | Performed by: SURGERY

## 2020-01-01 PROCEDURE — 99441 PR PHYS/QHP TELEPHONE EVALUATION 5-10 MIN: CPT | Performed by: NURSE PRACTITIONER

## 2020-01-01 PROCEDURE — 99153 MOD SED SAME PHYS/QHP EA: CPT | Performed by: INTERNAL MEDICINE

## 2020-01-01 PROCEDURE — 0 IOPAMIDOL PER 1 ML: Performed by: EMERGENCY MEDICINE

## 2020-01-01 PROCEDURE — 02H63MZ INSERTION OF CARDIAC LEAD INTO RIGHT ATRIUM, PERCUTANEOUS APPROACH: ICD-10-PCS | Performed by: INTERNAL MEDICINE

## 2020-01-01 PROCEDURE — 80061 LIPID PANEL: CPT | Performed by: INTERNAL MEDICINE

## 2020-01-01 PROCEDURE — 93880 EXTRACRANIAL BILAT STUDY: CPT

## 2020-01-01 PROCEDURE — 80053 COMPREHEN METABOLIC PANEL: CPT | Performed by: INTERNAL MEDICINE

## 2020-01-01 PROCEDURE — 84443 ASSAY THYROID STIM HORMONE: CPT | Performed by: INTERNAL MEDICINE

## 2020-01-01 PROCEDURE — 99024 POSTOP FOLLOW-UP VISIT: CPT | Performed by: NURSE PRACTITIONER

## 2020-01-01 PROCEDURE — 84156 ASSAY OF PROTEIN URINE: CPT | Performed by: NURSE PRACTITIONER

## 2020-01-01 PROCEDURE — 80048 BASIC METABOLIC PNL TOTAL CA: CPT | Performed by: NURSE PRACTITIONER

## 2020-01-01 PROCEDURE — 25010000002 PROPOFOL 10 MG/ML EMULSION: Performed by: NURSE ANESTHETIST, CERTIFIED REGISTERED

## 2020-01-01 PROCEDURE — 25010000002 VANCOMYCIN PER 500 MG: Performed by: INTERNAL MEDICINE

## 2020-01-01 PROCEDURE — 93321 DOPPLER ECHO F-UP/LMTD STD: CPT | Performed by: INTERNAL MEDICINE

## 2020-01-01 PROCEDURE — 93005 ELECTROCARDIOGRAM TRACING: CPT | Performed by: INTERNAL MEDICINE

## 2020-01-01 PROCEDURE — 93284 PRGRMG EVAL IMPLANTABLE DFB: CPT | Performed by: INTERNAL MEDICINE

## 2020-01-01 PROCEDURE — 84484 ASSAY OF TROPONIN QUANT: CPT | Performed by: EMERGENCY MEDICINE

## 2020-01-01 PROCEDURE — B5191ZA FLUOROSCOPY OF INFERIOR VENA CAVA USING LOW OSMOLAR CONTRAST, GUIDANCE: ICD-10-PCS | Performed by: SURGERY

## 2020-01-01 PROCEDURE — 93005 ELECTROCARDIOGRAM TRACING: CPT | Performed by: PHYSICIAN ASSISTANT

## 2020-01-01 PROCEDURE — 25010000002 HEPARIN (PORCINE) PER 1000 UNITS: Performed by: SURGERY

## 2020-01-01 PROCEDURE — 84484 ASSAY OF TROPONIN QUANT: CPT | Performed by: PHYSICIAN ASSISTANT

## 2020-01-01 PROCEDURE — 93005 ELECTROCARDIOGRAM TRACING: CPT | Performed by: EMERGENCY MEDICINE

## 2020-01-01 PROCEDURE — 25010000002 MIDAZOLAM PER 1 MG: Performed by: INTERNAL MEDICINE

## 2020-01-01 PROCEDURE — 93306 TTE W/DOPPLER COMPLETE: CPT | Performed by: INTERNAL MEDICINE

## 2020-01-01 PROCEDURE — 93000 ELECTROCARDIOGRAM COMPLETE: CPT | Performed by: INTERNAL MEDICINE

## 2020-01-01 PROCEDURE — 33244 REMOVE ELCTRD TRANSVENOUSLY: CPT | Performed by: INTERNAL MEDICINE

## 2020-01-01 PROCEDURE — 93306 TTE W/DOPPLER COMPLETE: CPT

## 2020-01-01 PROCEDURE — 85610 PROTHROMBIN TIME: CPT | Performed by: NURSE PRACTITIONER

## 2020-01-01 PROCEDURE — 25010000002 ENOXAPARIN PER 10 MG: Performed by: INTERNAL MEDICINE

## 2020-01-01 PROCEDURE — 93296 REM INTERROG EVL PM/IDS: CPT | Performed by: PHYSICIAN ASSISTANT

## 2020-01-01 PROCEDURE — 25010000002 FUROSEMIDE PER 20 MG: Performed by: INTERNAL MEDICINE

## 2020-01-01 PROCEDURE — 86850 RBC ANTIBODY SCREEN: CPT | Performed by: NURSE PRACTITIONER

## 2020-01-01 PROCEDURE — 84100 ASSAY OF PHOSPHORUS: CPT | Performed by: SURGERY

## 2020-01-01 PROCEDURE — 93325 DOPPLER ECHO COLOR FLOW MAPG: CPT

## 2020-01-01 PROCEDURE — 71046 X-RAY EXAM CHEST 2 VIEWS: CPT

## 2020-01-01 PROCEDURE — 25010000002 PHENYLEPHRINE HCL 0.8 MG/10ML SOLUTION PREFILLED SYRINGE: Performed by: NURSE ANESTHETIST, CERTIFIED REGISTERED

## 2020-01-01 PROCEDURE — 99284 EMERGENCY DEPT VISIT MOD MDM: CPT

## 2020-01-01 PROCEDURE — 25010000002 MORPHINE PER 10 MG: Performed by: EMERGENCY MEDICINE

## 2020-01-01 PROCEDURE — 25010000002 FENTANYL CITRATE (PF) 100 MCG/2ML SOLUTION: Performed by: INTERNAL MEDICINE

## 2020-01-01 PROCEDURE — 25010000002 VANCOMYCIN 10 G RECONSTITUTED SOLUTION: Performed by: INTERNAL MEDICINE

## 2020-01-01 PROCEDURE — 84300 ASSAY OF URINE SODIUM: CPT | Performed by: NURSE PRACTITIONER

## 2020-01-01 PROCEDURE — 85025 COMPLETE CBC W/AUTO DIFF WBC: CPT | Performed by: INTERNAL MEDICINE

## 2020-01-01 PROCEDURE — 99222 1ST HOSP IP/OBS MODERATE 55: CPT | Performed by: INTERNAL MEDICINE

## 2020-01-01 PROCEDURE — C1894 INTRO/SHEATH, NON-LASER: HCPCS | Performed by: SURGERY

## 2020-01-01 PROCEDURE — 93005 ELECTROCARDIOGRAM TRACING: CPT | Performed by: FAMILY MEDICINE

## 2020-01-01 PROCEDURE — 99233 SBSQ HOSP IP/OBS HIGH 50: CPT | Performed by: INTERNAL MEDICINE

## 2020-01-01 PROCEDURE — 87340 HEPATITIS B SURFACE AG IA: CPT | Performed by: INTERNAL MEDICINE

## 2020-01-01 PROCEDURE — 0JPT0PZ REMOVAL OF CARDIAC RHYTHM RELATED DEVICE FROM TRUNK SUBCUTANEOUS TISSUE AND FASCIA, OPEN APPROACH: ICD-10-PCS | Performed by: INTERNAL MEDICINE

## 2020-01-01 PROCEDURE — 36415 COLL VENOUS BLD VENIPUNCTURE: CPT | Performed by: INTERNAL MEDICINE

## 2020-01-01 PROCEDURE — 25010000003 CEFAZOLIN PER 500 MG: Performed by: NURSE ANESTHETIST, CERTIFIED REGISTERED

## 2020-01-01 PROCEDURE — 93880 EXTRACRANIAL BILAT STUDY: CPT | Performed by: SURGERY

## 2020-01-01 PROCEDURE — 71045 X-RAY EXAM CHEST 1 VIEW: CPT

## 2020-01-01 PROCEDURE — 33216 INSERT 1 ELECTRODE PM-DEFIB: CPT | Performed by: INTERNAL MEDICINE

## 2020-01-01 PROCEDURE — 5A1D70Z PERFORMANCE OF URINARY FILTRATION, INTERMITTENT, LESS THAN 6 HOURS PER DAY: ICD-10-PCS | Performed by: NURSE PRACTITIONER

## 2020-01-01 PROCEDURE — 93325 DOPPLER ECHO COLOR FLOW MAPG: CPT | Performed by: INTERNAL MEDICINE

## 2020-01-01 PROCEDURE — 0 TECHNETIUM SESTAMIBI: Performed by: INTERNAL MEDICINE

## 2020-01-01 PROCEDURE — 82570 ASSAY OF URINE CREATININE: CPT | Performed by: NURSE PRACTITIONER

## 2020-01-01 PROCEDURE — C9803 HOPD COVID-19 SPEC COLLECT: HCPCS | Performed by: EMERGENCY MEDICINE

## 2020-01-01 PROCEDURE — 83735 ASSAY OF MAGNESIUM: CPT | Performed by: NURSE PRACTITIONER

## 2020-01-01 PROCEDURE — 94799 UNLISTED PULMONARY SVC/PX: CPT

## 2020-01-01 PROCEDURE — 85730 THROMBOPLASTIN TIME PARTIAL: CPT | Performed by: NURSE PRACTITIONER

## 2020-01-01 PROCEDURE — 25010000002 ONDANSETRON PER 1 MG: Performed by: EMERGENCY MEDICINE

## 2020-01-01 PROCEDURE — 86706 HEP B SURFACE ANTIBODY: CPT | Performed by: INTERNAL MEDICINE

## 2020-01-01 PROCEDURE — 84100 ASSAY OF PHOSPHORUS: CPT | Performed by: FAMILY MEDICINE

## 2020-01-01 PROCEDURE — 25010000002 PERFLUTREN 6.52 MG/ML SUSPENSION: Performed by: INTERNAL MEDICINE

## 2020-01-01 PROCEDURE — C1900 LEAD, CORONARY VENOUS: HCPCS | Performed by: INTERNAL MEDICINE

## 2020-01-01 PROCEDURE — 93971 EXTREMITY STUDY: CPT

## 2020-01-01 PROCEDURE — 25010000002 FENTANYL CITRATE (PF) 100 MCG/2ML SOLUTION: Performed by: NURSE ANESTHETIST, CERTIFIED REGISTERED

## 2020-01-01 PROCEDURE — 36558 INSERT TUNNELED CV CATH: CPT

## 2020-01-01 PROCEDURE — C1882 AICD, OTHER THAN SING/DUAL: HCPCS | Performed by: INTERNAL MEDICINE

## 2020-01-01 PROCEDURE — 84484 ASSAY OF TROPONIN QUANT: CPT | Performed by: FAMILY MEDICINE

## 2020-01-01 PROCEDURE — 99214 OFFICE O/P EST MOD 30 MIN: CPT | Performed by: INTERNAL MEDICINE

## 2020-01-01 PROCEDURE — 99453 REM MNTR PHYSIOL PARAM SETUP: CPT | Performed by: INTERNAL MEDICINE

## 2020-01-01 PROCEDURE — 25010000002 EPOETIN ALFA-EPBX 10000 UNIT/ML SOLUTION: Performed by: SURGERY

## 2020-01-01 PROCEDURE — C9803 HOPD COVID-19 SPEC COLLECT: HCPCS | Performed by: SURGERY

## 2020-01-01 PROCEDURE — 25010000002 PERFLUTREN 6.52 MG/ML SUSPENSION: Performed by: FAMILY MEDICINE

## 2020-01-01 PROCEDURE — 85379 FIBRIN DEGRADATION QUANT: CPT | Performed by: EMERGENCY MEDICINE

## 2020-01-01 PROCEDURE — 99221 1ST HOSP IP/OBS SF/LOW 40: CPT | Performed by: SURGERY

## 2020-01-01 PROCEDURE — 33241 REMOVE PULSE GENERATOR: CPT | Performed by: INTERNAL MEDICINE

## 2020-01-01 PROCEDURE — 87635 SARS-COV-2 COVID-19 AMP PRB: CPT | Performed by: EMERGENCY MEDICINE

## 2020-01-01 PROCEDURE — 0JH63XZ INSERTION OF TUNNELED VASCULAR ACCESS DEVICE INTO CHEST SUBCUTANEOUS TISSUE AND FASCIA, PERCUTANEOUS APPROACH: ICD-10-PCS | Performed by: SURGERY

## 2020-01-01 PROCEDURE — 0JH609Z INSERTION OF CARDIAC RESYNCHRONIZATION DEFIBRILLATOR PULSE GENERATOR INTO CHEST SUBCUTANEOUS TISSUE AND FASCIA, OPEN APPROACH: ICD-10-PCS | Performed by: INTERNAL MEDICINE

## 2020-01-01 PROCEDURE — 25010000002 VANCOMYCIN 10 G RECONSTITUTED SOLUTION: Performed by: PHYSICIAN ASSISTANT

## 2020-01-01 PROCEDURE — 85025 COMPLETE CBC W/AUTO DIFF WBC: CPT | Performed by: PHYSICIAN ASSISTANT

## 2020-01-01 PROCEDURE — 85025 COMPLETE CBC W/AUTO DIFF WBC: CPT | Performed by: NURSE PRACTITIONER

## 2020-01-01 PROCEDURE — A9500 TC99M SESTAMIBI: HCPCS | Performed by: INTERNAL MEDICINE

## 2020-01-01 PROCEDURE — 80053 COMPREHEN METABOLIC PANEL: CPT | Performed by: NURSE PRACTITIONER

## 2020-01-01 PROCEDURE — G2066 INTER DEVC REMOTE 30D: HCPCS | Performed by: INTERNAL MEDICINE

## 2020-01-01 PROCEDURE — 83540 ASSAY OF IRON: CPT | Performed by: INTERNAL MEDICINE

## 2020-01-01 PROCEDURE — 76937 US GUIDE VASCULAR ACCESS: CPT | Performed by: SURGERY

## 2020-01-01 PROCEDURE — 93923 UPR/LXTR ART STDY 3+ LVLS: CPT

## 2020-01-01 PROCEDURE — 99223 1ST HOSP IP/OBS HIGH 75: CPT | Performed by: NURSE PRACTITIONER

## 2020-01-01 PROCEDURE — 84540 ASSAY OF URINE/UREA-N: CPT | Performed by: NURSE PRACTITIONER

## 2020-01-01 PROCEDURE — 33225 L VENTRIC PACING LEAD ADD-ON: CPT | Performed by: INTERNAL MEDICINE

## 2020-01-01 PROCEDURE — 93018 CV STRESS TEST I&R ONLY: CPT | Performed by: INTERNAL MEDICINE

## 2020-01-01 PROCEDURE — 25010000002 PHENYLEPHRINE 10 MG/ML SOLUTION 1 ML VIAL: Performed by: ANESTHESIOLOGY

## 2020-01-01 PROCEDURE — 93970 EXTREMITY STUDY: CPT

## 2020-01-01 PROCEDURE — 93321 DOPPLER ECHO F-UP/LMTD STD: CPT

## 2020-01-01 PROCEDURE — 83735 ASSAY OF MAGNESIUM: CPT | Performed by: FAMILY MEDICINE

## 2020-01-01 PROCEDURE — 83880 ASSAY OF NATRIURETIC PEPTIDE: CPT | Performed by: INTERNAL MEDICINE

## 2020-01-01 PROCEDURE — 02H63KZ INSERTION OF DEFIBRILLATOR LEAD INTO RIGHT ATRIUM, PERCUTANEOUS APPROACH: ICD-10-PCS | Performed by: INTERNAL MEDICINE

## 2020-01-01 PROCEDURE — 83970 ASSAY OF PARATHORMONE: CPT | Performed by: INTERNAL MEDICINE

## 2020-01-01 PROCEDURE — 99238 HOSP IP/OBS DSCHRG MGMT 30/<: CPT | Performed by: NURSE PRACTITIONER

## 2020-01-01 PROCEDURE — 87040 BLOOD CULTURE FOR BACTERIA: CPT | Performed by: INTERNAL MEDICINE

## 2020-01-01 PROCEDURE — 25010000003 CEFAZOLIN PER 500 MG: Performed by: SURGERY

## 2020-01-01 PROCEDURE — U0003 INFECTIOUS AGENT DETECTION BY NUCLEIC ACID (DNA OR RNA); SEVERE ACUTE RESPIRATORY SYNDROME CORONAVIRUS 2 (SARS-COV-2) (CORONAVIRUS DISEASE [COVID-19]), AMPLIFIED PROBE TECHNIQUE, MAKING USE OF HIGH THROUGHPUT TECHNOLOGIES AS DESCRIBED BY CMS-2020-01-R: HCPCS | Performed by: SURGERY

## 2020-01-01 PROCEDURE — 02PA4MZ REMOVAL OF CARDIAC LEAD FROM HEART, PERCUTANEOUS ENDOSCOPIC APPROACH: ICD-10-PCS | Performed by: INTERNAL MEDICINE

## 2020-01-01 PROCEDURE — 78452 HT MUSCLE IMAGE SPECT MULT: CPT | Performed by: INTERNAL MEDICINE

## 2020-01-01 PROCEDURE — 25010000002 HEPARIN (PORCINE) PER 1000 UNITS: Performed by: NURSE ANESTHETIST, CERTIFIED REGISTERED

## 2020-01-01 PROCEDURE — 99214 OFFICE O/P EST MOD 30 MIN: CPT | Performed by: SURGERY

## 2020-01-01 PROCEDURE — 80053 COMPREHEN METABOLIC PANEL: CPT | Performed by: PHYSICIAN ASSISTANT

## 2020-01-01 PROCEDURE — 78452 HT MUSCLE IMAGE SPECT MULT: CPT

## 2020-01-01 PROCEDURE — 0 IOPAMIDOL PER 1 ML: Performed by: INTERNAL MEDICINE

## 2020-01-01 PROCEDURE — 25010000002 MIDAZOLAM PER 1 MG: Performed by: ANESTHESIOLOGY

## 2020-01-01 PROCEDURE — 93295 DEV INTERROG REMOTE 1/2/MLT: CPT | Performed by: PHYSICIAN ASSISTANT

## 2020-01-01 PROCEDURE — 83036 HEMOGLOBIN GLYCOSYLATED A1C: CPT | Performed by: INTERNAL MEDICINE

## 2020-01-01 DEVICE — IMPLANTABLE DEVICE: Type: IMPLANTABLE DEVICE | Status: FUNCTIONAL

## 2020-01-01 DEVICE — LIGACLIP MCA MULTIPLE CLIP APPLIERS, 20 MEDIUM CLIPS
Type: IMPLANTABLE DEVICE | Status: FUNCTIONAL
Brand: LIGACLIP

## 2020-01-01 DEVICE — CARDIAC RESYNCHRONIZATION THERAPY DEFIBRILLATOR
Type: IMPLANTABLE DEVICE | Status: FUNCTIONAL
Brand: MOMENTUM™ X4 CRT-D

## 2020-01-01 DEVICE — PACE/SENSE LEAD
Type: IMPLANTABLE DEVICE | Status: FUNCTIONAL
Brand: ACUITY™ X4 STRAIGHT

## 2020-01-01 DEVICE — LIGACLIP MCA MULTIPLE CLIP APPLIERS, 20 SMALL CLIPS
Type: IMPLANTABLE DEVICE | Status: FUNCTIONAL
Brand: LIGACLIP

## 2020-01-01 RX ORDER — LIDOCAINE HYDROCHLORIDE 10 MG/ML
0.5 INJECTION, SOLUTION EPIDURAL; INFILTRATION; INTRACAUDAL; PERINEURAL ONCE AS NEEDED
Status: DISCONTINUED | OUTPATIENT
Start: 2020-01-01 | End: 2020-01-01

## 2020-01-01 RX ORDER — SODIUM CHLORIDE 0.9 % (FLUSH) 0.9 %
10 SYRINGE (ML) INJECTION AS NEEDED
Status: DISCONTINUED | OUTPATIENT
Start: 2020-01-01 | End: 2020-01-01 | Stop reason: HOSPADM

## 2020-01-01 RX ORDER — BUMETANIDE 1 MG/1
1 TABLET ORAL 2 TIMES DAILY
Qty: 60 TABLET | Refills: 1 | Status: SHIPPED | OUTPATIENT
Start: 2020-01-01 | End: 2020-01-01 | Stop reason: SDUPTHER

## 2020-01-01 RX ORDER — POTASSIUM CHLORIDE 750 MG/1
20 CAPSULE, EXTENDED RELEASE ORAL ONCE
Status: COMPLETED | OUTPATIENT
Start: 2020-01-01 | End: 2020-01-01

## 2020-01-01 RX ORDER — OXYCODONE AND ACETAMINOPHEN 10; 325 MG/1; MG/1
1 TABLET ORAL ONCE AS NEEDED
Status: DISCONTINUED | OUTPATIENT
Start: 2020-01-01 | End: 2020-01-01 | Stop reason: HOSPADM

## 2020-01-01 RX ORDER — ALLOPURINOL 100 MG/1
100 TABLET ORAL 2 TIMES DAILY
Status: DISCONTINUED | OUTPATIENT
Start: 2020-01-01 | End: 2020-01-01 | Stop reason: HOSPADM

## 2020-01-01 RX ORDER — FLUMAZENIL 0.1 MG/ML
0.2 INJECTION INTRAVENOUS AS NEEDED
Status: DISCONTINUED | OUTPATIENT
Start: 2020-01-01 | End: 2020-01-01 | Stop reason: HOSPADM

## 2020-01-01 RX ORDER — BUMETANIDE 1 MG/1
1 TABLET ORAL
Status: DISCONTINUED | OUTPATIENT
Start: 2020-01-01 | End: 2020-01-01 | Stop reason: HOSPADM

## 2020-01-01 RX ORDER — NALOXONE HCL 0.4 MG/ML
0.04 VIAL (ML) INJECTION AS NEEDED
Status: DISCONTINUED | OUTPATIENT
Start: 2020-01-01 | End: 2020-01-01 | Stop reason: HOSPADM

## 2020-01-01 RX ORDER — SODIUM CHLORIDE, SODIUM LACTATE, POTASSIUM CHLORIDE, CALCIUM CHLORIDE 600; 310; 30; 20 MG/100ML; MG/100ML; MG/100ML; MG/100ML
100 INJECTION, SOLUTION INTRAVENOUS CONTINUOUS
Status: DISCONTINUED | OUTPATIENT
Start: 2020-01-01 | End: 2020-01-01 | Stop reason: HOSPADM

## 2020-01-01 RX ORDER — FERRIC CITRATE 210 MG/1
2 TABLET, COATED ORAL
COMMUNITY

## 2020-01-01 RX ORDER — HYDRALAZINE HYDROCHLORIDE 10 MG/1
10 TABLET, FILM COATED ORAL EVERY 8 HOURS SCHEDULED
Status: DISCONTINUED | OUTPATIENT
Start: 2020-01-01 | End: 2020-01-01 | Stop reason: HOSPADM

## 2020-01-01 RX ORDER — NEOMYCIN AND POLYMYXIN B SULFATES 40; 200000 MG/ML; [USP'U]/ML
SOLUTION IRRIGATION AS NEEDED
Status: DISCONTINUED | OUTPATIENT
Start: 2020-01-01 | End: 2020-01-01 | Stop reason: HOSPADM

## 2020-01-01 RX ORDER — BUPIVACAINE HYDROCHLORIDE 5 MG/ML
INJECTION, SOLUTION PERINEURAL AS NEEDED
Status: DISCONTINUED | OUTPATIENT
Start: 2020-01-01 | End: 2020-01-01 | Stop reason: HOSPADM

## 2020-01-01 RX ORDER — FAMOTIDINE 20 MG/1
40 TABLET, FILM COATED ORAL 2 TIMES DAILY
Status: DISCONTINUED | OUTPATIENT
Start: 2020-01-01 | End: 2020-01-01

## 2020-01-01 RX ORDER — ONDANSETRON 2 MG/ML
4 INJECTION INTRAMUSCULAR; INTRAVENOUS EVERY 6 HOURS PRN
Status: DISCONTINUED | OUTPATIENT
Start: 2020-01-01 | End: 2020-01-01 | Stop reason: SDUPTHER

## 2020-01-01 RX ORDER — ATORVASTATIN CALCIUM 40 MG/1
40 TABLET, FILM COATED ORAL DAILY
Status: DISCONTINUED | OUTPATIENT
Start: 2020-01-01 | End: 2020-01-01 | Stop reason: HOSPADM

## 2020-01-01 RX ORDER — PROPOFOL 10 MG/ML
VIAL (ML) INTRAVENOUS AS NEEDED
Status: DISCONTINUED | OUTPATIENT
Start: 2020-01-01 | End: 2020-01-01 | Stop reason: SURG

## 2020-01-01 RX ORDER — SODIUM CHLORIDE 0.9 % (FLUSH) 0.9 %
10 SYRINGE (ML) INJECTION EVERY 12 HOURS SCHEDULED
Status: DISCONTINUED | OUTPATIENT
Start: 2020-01-01 | End: 2020-01-01 | Stop reason: HOSPADM

## 2020-01-01 RX ORDER — PANTOPRAZOLE SODIUM 40 MG/1
40 TABLET, DELAYED RELEASE ORAL 2 TIMES DAILY
Status: DISCONTINUED | OUTPATIENT
Start: 2020-01-01 | End: 2020-01-01 | Stop reason: HOSPADM

## 2020-01-01 RX ORDER — LABETALOL HYDROCHLORIDE 5 MG/ML
5 INJECTION, SOLUTION INTRAVENOUS
Status: DISCONTINUED | OUTPATIENT
Start: 2020-01-01 | End: 2020-01-01 | Stop reason: HOSPADM

## 2020-01-01 RX ORDER — SODIUM CHLORIDE, SODIUM LACTATE, POTASSIUM CHLORIDE, CALCIUM CHLORIDE 600; 310; 30; 20 MG/100ML; MG/100ML; MG/100ML; MG/100ML
1000 INJECTION, SOLUTION INTRAVENOUS CONTINUOUS
Status: DISCONTINUED | OUTPATIENT
Start: 2020-01-01 | End: 2020-01-01 | Stop reason: HOSPADM

## 2020-01-01 RX ORDER — FAMOTIDINE 20 MG/1
40 TABLET, FILM COATED ORAL DAILY
Status: DISCONTINUED | OUTPATIENT
Start: 2020-01-01 | End: 2020-01-01 | Stop reason: HOSPADM

## 2020-01-01 RX ORDER — FUROSEMIDE 10 MG/ML
40 INJECTION INTRAMUSCULAR; INTRAVENOUS ONCE
Status: COMPLETED | OUTPATIENT
Start: 2020-01-01 | End: 2020-01-01

## 2020-01-01 RX ORDER — FENTANYL CITRATE 50 UG/ML
INJECTION, SOLUTION INTRAMUSCULAR; INTRAVENOUS AS NEEDED
Status: DISCONTINUED | OUTPATIENT
Start: 2020-01-01 | End: 2020-01-01 | Stop reason: HOSPADM

## 2020-01-01 RX ORDER — ISOSORBIDE MONONITRATE 30 MG/1
30 TABLET, EXTENDED RELEASE ORAL
Status: DISCONTINUED | OUTPATIENT
Start: 2020-01-01 | End: 2020-01-01

## 2020-01-01 RX ORDER — ONDANSETRON 2 MG/ML
4 INJECTION INTRAMUSCULAR; INTRAVENOUS ONCE AS NEEDED
Status: DISCONTINUED | OUTPATIENT
Start: 2020-01-01 | End: 2020-01-01 | Stop reason: HOSPADM

## 2020-01-01 RX ORDER — CARVEDILOL 6.25 MG/1
12.5 TABLET ORAL 2 TIMES DAILY WITH MEALS
Status: DISCONTINUED | OUTPATIENT
Start: 2020-01-01 | End: 2020-01-01 | Stop reason: HOSPADM

## 2020-01-01 RX ORDER — HYDROCODONE BITARTRATE AND ACETAMINOPHEN 7.5; 325 MG/1; MG/1
1 TABLET ORAL EVERY 8 HOURS PRN
Status: DISCONTINUED | OUTPATIENT
Start: 2020-01-01 | End: 2020-01-01 | Stop reason: HOSPADM

## 2020-01-01 RX ORDER — SODIUM CHLORIDE 9 MG/ML
500 INJECTION, SOLUTION INTRAVENOUS CONTINUOUS
Status: DISCONTINUED | OUTPATIENT
Start: 2020-01-01 | End: 2020-01-01 | Stop reason: HOSPADM

## 2020-01-01 RX ORDER — BUMETANIDE 0.25 MG/ML
1 INJECTION INTRAMUSCULAR; INTRAVENOUS EVERY 12 HOURS
Status: DISCONTINUED | OUTPATIENT
Start: 2020-01-01 | End: 2020-01-01

## 2020-01-01 RX ORDER — CARVEDILOL 12.5 MG/1
12.5 TABLET ORAL 2 TIMES DAILY WITH MEALS
Qty: 180 TABLET | Refills: 3 | Status: SHIPPED | OUTPATIENT
Start: 2020-01-01 | End: 2020-01-01

## 2020-01-01 RX ORDER — ONDANSETRON 2 MG/ML
4 INJECTION INTRAMUSCULAR; INTRAVENOUS EVERY 6 HOURS PRN
Status: DISCONTINUED | OUTPATIENT
Start: 2020-01-01 | End: 2020-01-01 | Stop reason: HOSPADM

## 2020-01-01 RX ORDER — SODIUM CHLORIDE 0.9 % (FLUSH) 0.9 %
3 SYRINGE (ML) INJECTION EVERY 12 HOURS SCHEDULED
Status: DISCONTINUED | OUTPATIENT
Start: 2020-01-01 | End: 2020-01-01 | Stop reason: HOSPADM

## 2020-01-01 RX ORDER — VANCOMYCIN HYDROCHLORIDE 1 G/200ML
1000 INJECTION, SOLUTION INTRAVENOUS ONCE
Status: COMPLETED | OUTPATIENT
Start: 2020-01-01 | End: 2020-01-01

## 2020-01-01 RX ORDER — FENTANYL CITRATE 50 UG/ML
INJECTION, SOLUTION INTRAMUSCULAR; INTRAVENOUS AS NEEDED
Status: DISCONTINUED | OUTPATIENT
Start: 2020-01-01 | End: 2020-01-01 | Stop reason: SURG

## 2020-01-01 RX ORDER — BUPIVACAINE HCL/0.9 % NACL/PF 0.1 %
2 PLASTIC BAG, INJECTION (ML) EPIDURAL ONCE
Status: CANCELLED | OUTPATIENT
Start: 2020-01-01 | End: 2020-01-01

## 2020-01-01 RX ORDER — BUMETANIDE 1 MG/1
1 TABLET ORAL 2 TIMES DAILY
Status: DISCONTINUED | OUTPATIENT
Start: 2020-01-01 | End: 2020-01-01

## 2020-01-01 RX ORDER — MV-MINS/FOLIC/LYCOPENE/GINKGO 400-300MCG
1 TABLET ORAL DAILY
COMMUNITY

## 2020-01-01 RX ORDER — LIDOCAINE HYDROCHLORIDE 10 MG/ML
0.5 INJECTION, SOLUTION EPIDURAL; INFILTRATION; INTRACAUDAL; PERINEURAL ONCE AS NEEDED
Status: DISCONTINUED | OUTPATIENT
Start: 2020-01-01 | End: 2020-01-01 | Stop reason: HOSPADM

## 2020-01-01 RX ORDER — CALCITRIOL 0.25 UG/1
0.25 CAPSULE, LIQUID FILLED ORAL DAILY
Status: DISCONTINUED | OUTPATIENT
Start: 2020-01-01 | End: 2020-01-01 | Stop reason: HOSPADM

## 2020-01-01 RX ORDER — SODIUM CHLORIDE 9 MG/ML
50 INJECTION, SOLUTION INTRAVENOUS ONCE
Status: COMPLETED | OUTPATIENT
Start: 2020-01-01 | End: 2020-01-01

## 2020-01-01 RX ORDER — ASPIRIN 81 MG/1
81 TABLET ORAL DAILY
Status: DISCONTINUED | OUTPATIENT
Start: 2020-01-01 | End: 2020-01-01 | Stop reason: HOSPADM

## 2020-01-01 RX ORDER — BUPIVACAINE HCL/0.9 % NACL/PF 0.1 %
2 PLASTIC BAG, INJECTION (ML) EPIDURAL ONCE
Status: COMPLETED | OUTPATIENT
Start: 2020-01-01 | End: 2020-01-01

## 2020-01-01 RX ORDER — ALUMINA, MAGNESIA, AND SIMETHICONE 2400; 2400; 240 MG/30ML; MG/30ML; MG/30ML
15 SUSPENSION ORAL EVERY 6 HOURS PRN
Status: DISCONTINUED | OUTPATIENT
Start: 2020-01-01 | End: 2020-01-01 | Stop reason: HOSPADM

## 2020-01-01 RX ORDER — BUPIVACAINE HYDROCHLORIDE AND EPINEPHRINE 5; 5 MG/ML; UG/ML
INJECTION, SOLUTION EPIDURAL; INTRACAUDAL; PERINEURAL AS NEEDED
Status: DISCONTINUED | OUTPATIENT
Start: 2020-01-01 | End: 2020-01-01 | Stop reason: HOSPADM

## 2020-01-01 RX ORDER — FENTANYL CITRATE 50 UG/ML
25 INJECTION, SOLUTION INTRAMUSCULAR; INTRAVENOUS
Status: COMPLETED | OUTPATIENT
Start: 2020-01-01 | End: 2020-01-01

## 2020-01-01 RX ORDER — HEPARIN SODIUM 1000 [USP'U]/ML
INJECTION, SOLUTION INTRAVENOUS; SUBCUTANEOUS AS NEEDED
Status: DISCONTINUED | OUTPATIENT
Start: 2020-01-01 | End: 2020-01-01 | Stop reason: SURG

## 2020-01-01 RX ORDER — BUMETANIDE 1 MG/1
1 TABLET ORAL 2 TIMES DAILY
Qty: 180 TABLET | Refills: 3 | Status: SHIPPED | OUTPATIENT
Start: 2020-01-01

## 2020-01-01 RX ORDER — MIDAZOLAM HYDROCHLORIDE 1 MG/ML
1 INJECTION INTRAMUSCULAR; INTRAVENOUS
Status: DISCONTINUED | OUTPATIENT
Start: 2020-01-01 | End: 2020-01-01

## 2020-01-01 RX ORDER — HYDROMORPHONE HYDROCHLORIDE 1 MG/ML
0.25 INJECTION, SOLUTION INTRAMUSCULAR; INTRAVENOUS; SUBCUTANEOUS
Status: DISCONTINUED | OUTPATIENT
Start: 2020-01-01 | End: 2020-01-01 | Stop reason: HOSPADM

## 2020-01-01 RX ORDER — SODIUM CHLORIDE 0.9 % (FLUSH) 0.9 %
3 SYRINGE (ML) INJECTION EVERY 12 HOURS SCHEDULED
Status: DISCONTINUED | OUTPATIENT
Start: 2020-01-01 | End: 2020-01-01

## 2020-01-01 RX ORDER — ATORVASTATIN CALCIUM 40 MG/1
40 TABLET, FILM COATED ORAL DAILY
Status: DISCONTINUED | OUTPATIENT
Start: 2020-01-01 | End: 2020-01-01 | Stop reason: SDUPTHER

## 2020-01-01 RX ORDER — CEFAZOLIN SODIUM 1 G/3ML
INJECTION, POWDER, FOR SOLUTION INTRAMUSCULAR; INTRAVENOUS AS NEEDED
Status: DISCONTINUED | OUTPATIENT
Start: 2020-01-01 | End: 2020-01-01 | Stop reason: SURG

## 2020-01-01 RX ORDER — MANNITOL 250 MG/ML
25 INJECTION, SOLUTION INTRAVENOUS AS NEEDED
Status: DISCONTINUED | OUTPATIENT
Start: 2020-01-01 | End: 2020-01-01

## 2020-01-01 RX ORDER — CARVEDILOL 25 MG/1
25 TABLET ORAL 2 TIMES DAILY WITH MEALS
Status: ON HOLD | COMMUNITY
End: 2020-01-01 | Stop reason: SDUPTHER

## 2020-01-01 RX ORDER — FENTANYL CITRATE 50 UG/ML
25 INJECTION, SOLUTION INTRAMUSCULAR; INTRAVENOUS
Status: DISCONTINUED | OUTPATIENT
Start: 2020-01-01 | End: 2020-01-01 | Stop reason: HOSPADM

## 2020-01-01 RX ORDER — CARVEDILOL 25 MG/1
25 TABLET ORAL 2 TIMES DAILY WITH MEALS
Status: DISCONTINUED | OUTPATIENT
Start: 2020-01-01 | End: 2020-01-01

## 2020-01-01 RX ORDER — SODIUM CHLORIDE 9 MG/ML
INJECTION, SOLUTION INTRAVENOUS CONTINUOUS PRN
Status: DISCONTINUED | OUTPATIENT
Start: 2020-01-01 | End: 2020-01-01 | Stop reason: SURG

## 2020-01-01 RX ORDER — ACETAMINOPHEN 325 MG/1
650 TABLET ORAL EVERY 4 HOURS PRN
Status: DISCONTINUED | OUTPATIENT
Start: 2020-01-01 | End: 2020-01-01 | Stop reason: SDUPTHER

## 2020-01-01 RX ORDER — ONDANSETRON 2 MG/ML
4 INJECTION INTRAMUSCULAR; INTRAVENOUS AS NEEDED
Status: DISCONTINUED | OUTPATIENT
Start: 2020-01-01 | End: 2020-01-01 | Stop reason: HOSPADM

## 2020-01-01 RX ORDER — METOLAZONE 2.5 MG/1
2.5 TABLET ORAL DAILY
Status: DISCONTINUED | OUTPATIENT
Start: 2020-01-01 | End: 2020-01-01

## 2020-01-01 RX ORDER — HYDROCODONE BITARTRATE AND ACETAMINOPHEN 7.5; 325 MG/1; MG/1
1 TABLET ORAL ONCE AS NEEDED
Status: DISCONTINUED | OUTPATIENT
Start: 2020-01-01 | End: 2020-01-01 | Stop reason: HOSPADM

## 2020-01-01 RX ORDER — CARVEDILOL 25 MG/1
25 TABLET ORAL 2 TIMES DAILY WITH MEALS
Status: DISCONTINUED | OUTPATIENT
Start: 2020-01-01 | End: 2020-01-01 | Stop reason: HOSPADM

## 2020-01-01 RX ORDER — MULTIVIT,CALC,MINS/IRON/FOLIC 9MG-400MCG
1 TABLET ORAL DAILY
Status: DISCONTINUED | OUTPATIENT
Start: 2020-01-01 | End: 2020-01-01 | Stop reason: HOSPADM

## 2020-01-01 RX ORDER — FUROSEMIDE 40 MG/1
40 TABLET ORAL 2 TIMES DAILY
COMMUNITY
End: 2020-01-01 | Stop reason: HOSPADM

## 2020-01-01 RX ORDER — OXYCODONE AND ACETAMINOPHEN 7.5; 325 MG/1; MG/1
2 TABLET ORAL EVERY 4 HOURS PRN
Status: DISCONTINUED | OUTPATIENT
Start: 2020-01-01 | End: 2020-01-01 | Stop reason: HOSPADM

## 2020-01-01 RX ORDER — FUROSEMIDE 10 MG/ML
40 INJECTION INTRAMUSCULAR; INTRAVENOUS 2 TIMES DAILY
Status: DISCONTINUED | OUTPATIENT
Start: 2020-01-01 | End: 2020-01-01 | Stop reason: HOSPADM

## 2020-01-01 RX ORDER — SODIUM CHLORIDE 0.9 % (FLUSH) 0.9 %
3-10 SYRINGE (ML) INJECTION AS NEEDED
Status: DISCONTINUED | OUTPATIENT
Start: 2020-01-01 | End: 2020-01-01 | Stop reason: HOSPADM

## 2020-01-01 RX ORDER — NALOXONE HCL 0.4 MG/ML
0.4 VIAL (ML) INJECTION AS NEEDED
Status: DISCONTINUED | OUTPATIENT
Start: 2020-01-01 | End: 2020-01-01 | Stop reason: HOSPADM

## 2020-01-01 RX ORDER — ATORVASTATIN CALCIUM 40 MG/1
40 TABLET, FILM COATED ORAL NIGHTLY
COMMUNITY

## 2020-01-01 RX ORDER — ACETAMINOPHEN 325 MG/1
650 TABLET ORAL EVERY 4 HOURS PRN
Status: DISCONTINUED | OUTPATIENT
Start: 2020-01-01 | End: 2020-01-01 | Stop reason: HOSPADM

## 2020-01-01 RX ORDER — ATORVASTATIN CALCIUM 40 MG/1
40 TABLET, FILM COATED ORAL NIGHTLY
Status: DISCONTINUED | OUTPATIENT
Start: 2020-01-01 | End: 2020-01-01 | Stop reason: HOSPADM

## 2020-01-01 RX ORDER — ACETAMINOPHEN 160 MG/5ML
650 SOLUTION ORAL EVERY 4 HOURS PRN
Status: DISCONTINUED | OUTPATIENT
Start: 2020-01-01 | End: 2020-01-01 | Stop reason: HOSPADM

## 2020-01-01 RX ORDER — METOLAZONE 2.5 MG/1
2.5 TABLET ORAL TAKE AS DIRECTED
COMMUNITY
Start: 2020-01-01

## 2020-01-01 RX ORDER — CALCITRIOL 0.25 UG/1
0.25 CAPSULE, LIQUID FILLED ORAL TAKE AS DIRECTED
COMMUNITY

## 2020-01-01 RX ORDER — HEPARIN SODIUM 1000 [USP'U]/ML
INJECTION, SOLUTION INTRAVENOUS; SUBCUTANEOUS AS NEEDED
Status: DISCONTINUED | OUTPATIENT
Start: 2020-01-01 | End: 2020-01-01 | Stop reason: HOSPADM

## 2020-01-01 RX ORDER — CARVEDILOL 12.5 MG/1
12.5 TABLET ORAL 2 TIMES DAILY WITH MEALS
Qty: 60 TABLET | Refills: 1 | Status: SHIPPED | OUTPATIENT
Start: 2020-01-01 | End: 2020-01-01 | Stop reason: SDUPTHER

## 2020-01-01 RX ORDER — MIDAZOLAM HYDROCHLORIDE 1 MG/ML
2 INJECTION INTRAMUSCULAR; INTRAVENOUS
Status: DISCONTINUED | OUTPATIENT
Start: 2020-01-01 | End: 2020-01-01

## 2020-01-01 RX ORDER — SODIUM CHLORIDE 0.9 % (FLUSH) 0.9 %
3-10 SYRINGE (ML) INJECTION AS NEEDED
Status: DISCONTINUED | OUTPATIENT
Start: 2020-01-01 | End: 2020-01-01

## 2020-01-01 RX ORDER — AMIODARONE HYDROCHLORIDE 200 MG/1
200 TABLET ORAL 2 TIMES DAILY
Status: DISCONTINUED | OUTPATIENT
Start: 2020-01-01 | End: 2020-01-01 | Stop reason: HOSPADM

## 2020-01-01 RX ORDER — ISOSORBIDE MONONITRATE 30 MG/1
30 TABLET, EXTENDED RELEASE ORAL EVERY 24 HOURS
Status: DISCONTINUED | OUTPATIENT
Start: 2020-01-01 | End: 2020-01-01 | Stop reason: HOSPADM

## 2020-01-01 RX ORDER — BISACODYL 5 MG/1
5 TABLET, DELAYED RELEASE ORAL DAILY PRN
Status: DISCONTINUED | OUTPATIENT
Start: 2020-01-01 | End: 2020-01-01 | Stop reason: HOSPADM

## 2020-01-01 RX ORDER — CARVEDILOL 25 MG/1
25 TABLET ORAL EVERY 12 HOURS SCHEDULED
Status: DISCONTINUED | OUTPATIENT
Start: 2020-01-01 | End: 2020-01-01 | Stop reason: HOSPADM

## 2020-01-01 RX ORDER — ONDANSETRON 2 MG/ML
4 INJECTION INTRAMUSCULAR; INTRAVENOUS ONCE
Status: COMPLETED | OUTPATIENT
Start: 2020-01-01 | End: 2020-01-01

## 2020-01-01 RX ORDER — AMIODARONE HYDROCHLORIDE 200 MG/1
800 TABLET ORAL ONCE
Status: COMPLETED | OUTPATIENT
Start: 2020-01-01 | End: 2020-01-01

## 2020-01-01 RX ORDER — HYDROCODONE BITARTRATE AND ACETAMINOPHEN 7.5; 325 MG/1; MG/1
1-2 TABLET ORAL EVERY 6 HOURS PRN
Qty: 30 TABLET | Refills: 0 | Status: SHIPPED | OUTPATIENT
Start: 2020-01-01 | End: 2020-01-01

## 2020-01-01 RX ORDER — OXYCODONE AND ACETAMINOPHEN 7.5; 325 MG/1; MG/1
1 TABLET ORAL EVERY 4 HOURS PRN
Status: DISCONTINUED | OUTPATIENT
Start: 2020-01-01 | End: 2020-01-01 | Stop reason: HOSPADM

## 2020-01-01 RX ORDER — ONDANSETRON 4 MG/1
4 TABLET, FILM COATED ORAL EVERY 6 HOURS PRN
Status: DISCONTINUED | OUTPATIENT
Start: 2020-01-01 | End: 2020-01-01 | Stop reason: HOSPADM

## 2020-01-01 RX ORDER — OXYCODONE HYDROCHLORIDE AND ACETAMINOPHEN 5; 325 MG/1; MG/1
1 TABLET ORAL ONCE AS NEEDED
Status: DISCONTINUED | OUTPATIENT
Start: 2020-01-01 | End: 2020-01-01 | Stop reason: HOSPADM

## 2020-01-01 RX ORDER — AMIODARONE HYDROCHLORIDE 200 MG/1
200 TABLET ORAL 2 TIMES DAILY
Qty: 60 TABLET | Refills: 1 | Status: SHIPPED | OUTPATIENT
Start: 2020-01-01 | End: 2020-01-01 | Stop reason: SDUPTHER

## 2020-01-01 RX ORDER — HYDROCODONE BITARTRATE AND ACETAMINOPHEN 7.5; 325 MG/1; MG/1
2 TABLET ORAL EVERY 4 HOURS PRN
Status: DISCONTINUED | OUTPATIENT
Start: 2020-01-01 | End: 2020-01-01 | Stop reason: ALTCHOICE

## 2020-01-01 RX ORDER — SODIUM CHLORIDE 0.9 % (FLUSH) 0.9 %
3 SYRINGE (ML) INJECTION AS NEEDED
Status: DISCONTINUED | OUTPATIENT
Start: 2020-01-01 | End: 2020-01-01 | Stop reason: HOSPADM

## 2020-01-01 RX ORDER — AMIODARONE HYDROCHLORIDE 200 MG/1
200 TABLET ORAL 2 TIMES DAILY
COMMUNITY

## 2020-01-01 RX ORDER — BUMETANIDE 0.25 MG/ML
2 INJECTION INTRAMUSCULAR; INTRAVENOUS EVERY 12 HOURS
Status: DISCONTINUED | OUTPATIENT
Start: 2020-01-01 | End: 2020-01-01

## 2020-01-01 RX ORDER — ATORVASTATIN CALCIUM 40 MG/1
TABLET, FILM COATED ORAL
Qty: 90 TABLET | Refills: 4 | Status: ON HOLD | OUTPATIENT
Start: 2020-01-01 | End: 2020-01-01

## 2020-01-01 RX ORDER — FAMOTIDINE 40 MG/1
40 TABLET, FILM COATED ORAL 2 TIMES DAILY
COMMUNITY
End: 2020-01-01 | Stop reason: HOSPADM

## 2020-01-01 RX ORDER — SODIUM CHLORIDE 9 MG/ML
50 INJECTION, SOLUTION INTRAVENOUS CONTINUOUS
Status: DISCONTINUED | OUTPATIENT
Start: 2020-01-01 | End: 2020-01-01 | Stop reason: HOSPADM

## 2020-01-01 RX ORDER — CALCITRIOL 0.25 UG/1
0.25 CAPSULE, LIQUID FILLED ORAL DAILY
Status: ON HOLD | COMMUNITY
End: 2020-01-01

## 2020-01-01 RX ORDER — BUMETANIDE 1 MG/1
1 TABLET ORAL DAILY
Status: DISCONTINUED | OUTPATIENT
Start: 2020-01-01 | End: 2020-01-01 | Stop reason: SDUPTHER

## 2020-01-01 RX ORDER — FUROSEMIDE 10 MG/ML
40 INJECTION INTRAMUSCULAR; INTRAVENOUS ONCE
Status: DISCONTINUED | OUTPATIENT
Start: 2020-01-01 | End: 2020-01-01

## 2020-01-01 RX ORDER — CALCITRIOL 0.25 UG/1
0.25 CAPSULE, LIQUID FILLED ORAL
Status: DISCONTINUED | OUTPATIENT
Start: 2020-01-01 | End: 2020-01-01 | Stop reason: HOSPADM

## 2020-01-01 RX ORDER — MIDAZOLAM HYDROCHLORIDE 1 MG/ML
INJECTION INTRAMUSCULAR; INTRAVENOUS AS NEEDED
Status: DISCONTINUED | OUTPATIENT
Start: 2020-01-01 | End: 2020-01-01 | Stop reason: HOSPADM

## 2020-01-01 RX ORDER — PHENYLEPHRINE HCL IN 0.9% NACL 0.8MG/10ML
SYRINGE (ML) INTRAVENOUS AS NEEDED
Status: DISCONTINUED | OUTPATIENT
Start: 2020-01-01 | End: 2020-01-01 | Stop reason: SURG

## 2020-01-01 RX ORDER — EPHEDRINE SULFATE 50 MG/ML
INJECTION, SOLUTION INTRAVENOUS AS NEEDED
Status: DISCONTINUED | OUTPATIENT
Start: 2020-01-01 | End: 2020-01-01 | Stop reason: SURG

## 2020-01-01 RX ORDER — AMIODARONE HYDROCHLORIDE 200 MG/1
200 TABLET ORAL 2 TIMES DAILY
Qty: 180 TABLET | Refills: 1 | Status: ON HOLD | OUTPATIENT
Start: 2020-01-01 | End: 2020-01-01

## 2020-01-01 RX ORDER — AMIODARONE HYDROCHLORIDE 200 MG/1
200 TABLET ORAL 3 TIMES DAILY
Status: DISCONTINUED | OUTPATIENT
Start: 2020-01-01 | End: 2020-01-01 | Stop reason: HOSPADM

## 2020-01-01 RX ORDER — LEVOTHYROXINE SODIUM 0.05 MG/1
50 TABLET ORAL DAILY
Qty: 30 TABLET | Refills: 1 | Status: SHIPPED | OUTPATIENT
Start: 2020-01-01 | End: 2020-01-01

## 2020-01-01 RX ORDER — HYDROCODONE BITARTRATE AND ACETAMINOPHEN 7.5; 325 MG/1; MG/1
1 TABLET ORAL EVERY 6 HOURS PRN
Status: DISCONTINUED | OUTPATIENT
Start: 2020-01-01 | End: 2020-01-01 | Stop reason: HOSPADM

## 2020-01-01 RX ORDER — METOLAZONE 2.5 MG/1
2.5 TABLET ORAL 3 TIMES WEEKLY
Status: DISCONTINUED | OUTPATIENT
Start: 2020-01-01 | End: 2020-01-01 | Stop reason: HOSPADM

## 2020-01-01 RX ORDER — METOLAZONE 2.5 MG/1
2.5 TABLET ORAL EVERY OTHER DAY
Status: ON HOLD | COMMUNITY
End: 2020-01-01

## 2020-01-01 RX ORDER — LEVOTHYROXINE SODIUM 0.05 MG/1
50 TABLET ORAL DAILY
Qty: 30 TABLET | Refills: 1 | Status: SHIPPED | OUTPATIENT
Start: 2020-01-01

## 2020-01-01 RX ORDER — OXYCODONE AND ACETAMINOPHEN 7.5; 325 MG/1; MG/1
1 TABLET ORAL EVERY 4 HOURS PRN
Status: CANCELLED | OUTPATIENT
Start: 2020-01-01 | End: 2020-01-01

## 2020-01-01 RX ORDER — IPRATROPIUM BROMIDE AND ALBUTEROL SULFATE 2.5; .5 MG/3ML; MG/3ML
3 SOLUTION RESPIRATORY (INHALATION) ONCE AS NEEDED
Status: DISCONTINUED | OUTPATIENT
Start: 2020-01-01 | End: 2020-01-01 | Stop reason: HOSPADM

## 2020-01-01 RX ORDER — ACETAMINOPHEN 650 MG/1
650 SUPPOSITORY RECTAL EVERY 4 HOURS PRN
Status: DISCONTINUED | OUTPATIENT
Start: 2020-01-01 | End: 2020-01-01 | Stop reason: HOSPADM

## 2020-01-01 RX ORDER — LEVOTHYROXINE SODIUM 0.05 MG/1
50 TABLET ORAL
Status: DISCONTINUED | OUTPATIENT
Start: 2020-01-01 | End: 2020-01-01 | Stop reason: HOSPADM

## 2020-01-01 RX ORDER — SODIUM CHLORIDE, SODIUM LACTATE, POTASSIUM CHLORIDE, CALCIUM CHLORIDE 600; 310; 30; 20 MG/100ML; MG/100ML; MG/100ML; MG/100ML
100 INJECTION, SOLUTION INTRAVENOUS CONTINUOUS
Status: DISCONTINUED | OUTPATIENT
Start: 2020-01-01 | End: 2020-01-01

## 2020-01-01 RX ADMIN — FUROSEMIDE 40 MG: 10 INJECTION, SOLUTION INTRAMUSCULAR; INTRAVENOUS at 17:51

## 2020-01-01 RX ADMIN — PHENYLEPHRINE HYDROCHLORIDE 1 MCG/KG/MIN: 10 INJECTION INTRAVENOUS at 13:50

## 2020-01-01 RX ADMIN — ATORVASTATIN CALCIUM 40 MG: 40 TABLET, FILM COATED ORAL at 21:14

## 2020-01-01 RX ADMIN — AMIODARONE HYDROCHLORIDE 200 MG: 200 TABLET ORAL at 08:53

## 2020-01-01 RX ADMIN — PROPOFOL 30 MG: 10 INJECTION, EMULSION INTRAVENOUS at 17:01

## 2020-01-01 RX ADMIN — SACUBITRIL AND VALSARTAN 1 TABLET: 24; 26 TABLET, FILM COATED ORAL at 10:04

## 2020-01-01 RX ADMIN — HYDRALAZINE HYDROCHLORIDE 10 MG: 10 TABLET ORAL at 22:10

## 2020-01-01 RX ADMIN — AMIODARONE HYDROCHLORIDE 200 MG: 200 TABLET ORAL at 09:22

## 2020-01-01 RX ADMIN — CARVEDILOL 25 MG: 25 TABLET, FILM COATED ORAL at 08:17

## 2020-01-01 RX ADMIN — PROPOFOL 50 MG: 10 INJECTION, EMULSION INTRAVENOUS at 16:55

## 2020-01-01 RX ADMIN — PROPOFOL 50 MCG/KG/MIN: 10 INJECTION, EMULSION INTRAVENOUS at 07:54

## 2020-01-01 RX ADMIN — CARVEDILOL 25 MG: 25 TABLET, FILM COATED ORAL at 21:18

## 2020-01-01 RX ADMIN — TECHNETIUM TC 99M SESTAMIBI 1 DOSE: 1 INJECTION INTRAVENOUS at 12:10

## 2020-01-01 RX ADMIN — IRON SUCROSE 200 MG: 20 INJECTION, SOLUTION INTRAVENOUS at 16:40

## 2020-01-01 RX ADMIN — ALLOPURINOL 100 MG: 100 TABLET ORAL at 09:01

## 2020-01-01 RX ADMIN — MORPHINE SULFATE 4 MG: 4 INJECTION, SOLUTION INTRAMUSCULAR; INTRAVENOUS at 14:20

## 2020-01-01 RX ADMIN — PANTOPRAZOLE SODIUM 40 MG: 40 TABLET, DELAYED RELEASE ORAL at 20:48

## 2020-01-01 RX ADMIN — AMIODARONE HYDROCHLORIDE 200 MG: 200 TABLET ORAL at 08:17

## 2020-01-01 RX ADMIN — IRON SUCROSE 200 MG: 20 INJECTION, SOLUTION INTRAVENOUS at 17:18

## 2020-01-01 RX ADMIN — CARVEDILOL 25 MG: 25 TABLET, FILM COATED ORAL at 20:18

## 2020-01-01 RX ADMIN — CARVEDILOL 25 MG: 25 TABLET, FILM COATED ORAL at 10:27

## 2020-01-01 RX ADMIN — ALLOPURINOL 100 MG: 100 TABLET ORAL at 10:27

## 2020-01-01 RX ADMIN — HYDROCODONE BITARTRATE AND ACETAMINOPHEN 1 TABLET: 7.5; 325 TABLET ORAL at 15:04

## 2020-01-01 RX ADMIN — ALLOPURINOL 100 MG: 100 TABLET ORAL at 20:33

## 2020-01-01 RX ADMIN — BUMETANIDE 1 MG: 0.25 INJECTION INTRAMUSCULAR; INTRAVENOUS at 06:32

## 2020-01-01 RX ADMIN — BUMETANIDE 1 MG: 0.25 INJECTION INTRAMUSCULAR; INTRAVENOUS at 05:54

## 2020-01-01 RX ADMIN — Medication 260 MCG: at 13:16

## 2020-01-01 RX ADMIN — VASOPRESSIN 1 UNITS: 20 INJECTION INTRAVENOUS at 12:53

## 2020-01-01 RX ADMIN — SODIUM CHLORIDE 1000 ML: 9 INJECTION, SOLUTION INTRAVENOUS at 07:47

## 2020-01-01 RX ADMIN — CARVEDILOL 25 MG: 25 TABLET, FILM COATED ORAL at 09:01

## 2020-01-01 RX ADMIN — IRON SUCROSE 200 MG: 20 INJECTION, SOLUTION INTRAVENOUS at 17:08

## 2020-01-01 RX ADMIN — HYDRALAZINE HYDROCHLORIDE 10 MG: 10 TABLET ORAL at 15:04

## 2020-01-01 RX ADMIN — METOLAZONE 2.5 MG: 2.5 TABLET ORAL at 08:17

## 2020-01-01 RX ADMIN — PANTOPRAZOLE SODIUM 40 MG: 40 TABLET, DELAYED RELEASE ORAL at 21:08

## 2020-01-01 RX ADMIN — BUMETANIDE 1 MG: 1 TABLET ORAL at 08:17

## 2020-01-01 RX ADMIN — PANTOPRAZOLE SODIUM 40 MG: 40 TABLET, DELAYED RELEASE ORAL at 09:14

## 2020-01-01 RX ADMIN — SODIUM CHLORIDE: 9 INJECTION, SOLUTION INTRAVENOUS at 16:49

## 2020-01-01 RX ADMIN — ALLOPURINOL 100 MG: 100 TABLET ORAL at 09:05

## 2020-01-01 RX ADMIN — AMIODARONE HYDROCHLORIDE 200 MG: 200 TABLET ORAL at 16:34

## 2020-01-01 RX ADMIN — FENTANYL CITRATE 25 MCG: 50 INJECTION INTRAMUSCULAR; INTRAVENOUS at 10:36

## 2020-01-01 RX ADMIN — HYDROCODONE BITARTRATE AND ACETAMINOPHEN 1 TABLET: 7.5; 325 TABLET ORAL at 23:22

## 2020-01-01 RX ADMIN — AMIODARONE HYDROCHLORIDE 200 MG: 200 TABLET ORAL at 20:18

## 2020-01-01 RX ADMIN — ACETAMINOPHEN 650 MG: 325 TABLET, FILM COATED ORAL at 20:28

## 2020-01-01 RX ADMIN — AMIODARONE HYDROCHLORIDE 200 MG: 200 TABLET ORAL at 21:03

## 2020-01-01 RX ADMIN — PANTOPRAZOLE SODIUM 40 MG: 40 TABLET, DELAYED RELEASE ORAL at 09:22

## 2020-01-01 RX ADMIN — CARVEDILOL 25 MG: 25 TABLET, FILM COATED ORAL at 10:00

## 2020-01-01 RX ADMIN — ALLOPURINOL 100 MG: 100 TABLET ORAL at 21:18

## 2020-01-01 RX ADMIN — BUMETANIDE 1 MG: 1 TABLET ORAL at 17:26

## 2020-01-01 RX ADMIN — BUMETANIDE 1 MG: 1 TABLET ORAL at 08:53

## 2020-01-01 RX ADMIN — SODIUM CHLORIDE, PRESERVATIVE FREE 10 ML: 5 INJECTION INTRAVENOUS at 21:08

## 2020-01-01 RX ADMIN — VASOPRESSIN 1 UNITS: 20 INJECTION INTRAVENOUS at 13:07

## 2020-01-01 RX ADMIN — ISOSORBIDE MONONITRATE 30 MG: 30 TABLET, EXTENDED RELEASE ORAL at 12:56

## 2020-01-01 RX ADMIN — AMIODARONE HYDROCHLORIDE 200 MG: 200 TABLET ORAL at 09:14

## 2020-01-01 RX ADMIN — SODIUM CHLORIDE, PRESERVATIVE FREE 10 ML: 5 INJECTION INTRAVENOUS at 10:41

## 2020-01-01 RX ADMIN — SODIUM CHLORIDE, PRESERVATIVE FREE 10 ML: 5 INJECTION INTRAVENOUS at 20:14

## 2020-01-01 RX ADMIN — TECHNETIUM TC 99M SESTAMIBI 1 DOSE: 1 INJECTION INTRAVENOUS at 12:50

## 2020-01-01 RX ADMIN — HYDRALAZINE HYDROCHLORIDE 10 MG: 10 TABLET ORAL at 06:19

## 2020-01-01 RX ADMIN — ATORVASTATIN CALCIUM 40 MG: 40 TABLET, FILM COATED ORAL at 10:04

## 2020-01-01 RX ADMIN — ASPIRIN 81 MG: 81 TABLET ORAL at 09:01

## 2020-01-01 RX ADMIN — VANCOMYCIN HYDROCHLORIDE 1500 MG: 10 INJECTION, POWDER, LYOPHILIZED, FOR SOLUTION INTRAVENOUS at 23:02

## 2020-01-01 RX ADMIN — ALLOPURINOL 100 MG: 100 TABLET ORAL at 21:09

## 2020-01-01 RX ADMIN — ONDANSETRON HYDROCHLORIDE 4 MG: 2 SOLUTION INTRAMUSCULAR; INTRAVENOUS at 14:20

## 2020-01-01 RX ADMIN — PROPOFOL 30 MG: 10 INJECTION, EMULSION INTRAVENOUS at 16:59

## 2020-01-01 RX ADMIN — ATORVASTATIN CALCIUM 40 MG: 40 TABLET, FILM COATED ORAL at 21:36

## 2020-01-01 RX ADMIN — Medication 1 TABLET: at 20:34

## 2020-01-01 RX ADMIN — PANTOPRAZOLE SODIUM 40 MG: 40 TABLET, DELAYED RELEASE ORAL at 20:34

## 2020-01-01 RX ADMIN — ASPIRIN 81 MG: 81 TABLET ORAL at 08:47

## 2020-01-01 RX ADMIN — HYDROCODONE BITARTRATE AND ACETAMINOPHEN 1 TABLET: 7.5; 325 TABLET ORAL at 01:35

## 2020-01-01 RX ADMIN — SODIUM CHLORIDE, PRESERVATIVE FREE 10 ML: 5 INJECTION INTRAVENOUS at 09:15

## 2020-01-01 RX ADMIN — PERFLUTREN 8.48 MG: 6.52 INJECTION, SUSPENSION INTRAVENOUS at 15:45

## 2020-01-01 RX ADMIN — ALLOPURINOL 100 MG: 100 TABLET ORAL at 10:05

## 2020-01-01 RX ADMIN — PANTOPRAZOLE SODIUM 40 MG: 40 TABLET, DELAYED RELEASE ORAL at 20:27

## 2020-01-01 RX ADMIN — CARVEDILOL 25 MG: 25 TABLET, FILM COATED ORAL at 10:40

## 2020-01-01 RX ADMIN — ASPIRIN 81 MG: 81 TABLET ORAL at 08:31

## 2020-01-01 RX ADMIN — CARVEDILOL 25 MG: 25 TABLET, FILM COATED ORAL at 08:40

## 2020-01-01 RX ADMIN — SODIUM CHLORIDE, PRESERVATIVE FREE 10 ML: 5 INJECTION INTRAVENOUS at 08:18

## 2020-01-01 RX ADMIN — ALLOPURINOL 100 MG: 100 TABLET ORAL at 20:48

## 2020-01-01 RX ADMIN — SODIUM CHLORIDE 50 ML/HR: 9 INJECTION, SOLUTION INTRAVENOUS at 16:41

## 2020-01-01 RX ADMIN — FENTANYL CITRATE 25 MCG: 50 INJECTION INTRAMUSCULAR; INTRAVENOUS at 11:12

## 2020-01-01 RX ADMIN — PANTOPRAZOLE SODIUM 40 MG: 40 TABLET, DELAYED RELEASE ORAL at 12:03

## 2020-01-01 RX ADMIN — SACUBITRIL AND VALSARTAN 1 TABLET: 24; 26 TABLET, FILM COATED ORAL at 10:27

## 2020-01-01 RX ADMIN — PANTOPRAZOLE SODIUM 40 MG: 40 TABLET, DELAYED RELEASE ORAL at 20:14

## 2020-01-01 RX ADMIN — SACUBITRIL AND VALSARTAN 1 TABLET: 24; 26 TABLET, FILM COATED ORAL at 08:47

## 2020-01-01 RX ADMIN — HYDROCODONE BITARTRATE AND ACETAMINOPHEN 1 TABLET: 7.5; 325 TABLET ORAL at 17:17

## 2020-01-01 RX ADMIN — ASPIRIN 81 MG: 81 TABLET ORAL at 09:14

## 2020-01-01 RX ADMIN — ALLOPURINOL 100 MG: 100 TABLET ORAL at 20:18

## 2020-01-01 RX ADMIN — ATORVASTATIN CALCIUM 40 MG: 40 TABLET, FILM COATED ORAL at 21:18

## 2020-01-01 RX ADMIN — SODIUM CHLORIDE, PRESERVATIVE FREE 10 ML: 5 INJECTION INTRAVENOUS at 12:04

## 2020-01-01 RX ADMIN — FENTANYL CITRATE 25 MCG: 50 INJECTION INTRAMUSCULAR; INTRAVENOUS at 10:31

## 2020-01-01 RX ADMIN — BUMETANIDE 0.5 MG/HR: 0.25 INJECTION INTRAMUSCULAR; INTRAVENOUS at 12:38

## 2020-01-01 RX ADMIN — CALCITRIOL 0.25 MCG: 0.25 CAPSULE ORAL at 10:40

## 2020-01-01 RX ADMIN — CARVEDILOL 25 MG: 25 TABLET, FILM COATED ORAL at 09:14

## 2020-01-01 RX ADMIN — ATORVASTATIN CALCIUM 40 MG: 40 TABLET, FILM COATED ORAL at 21:01

## 2020-01-01 RX ADMIN — ATORVASTATIN CALCIUM 40 MG: 40 TABLET, FILM COATED ORAL at 08:27

## 2020-01-01 RX ADMIN — FUROSEMIDE 40 MG: 10 INJECTION, SOLUTION INTRAMUSCULAR; INTRAVENOUS at 17:10

## 2020-01-01 RX ADMIN — SODIUM CHLORIDE, PRESERVATIVE FREE 10 ML: 5 INJECTION INTRAVENOUS at 20:35

## 2020-01-01 RX ADMIN — AMIODARONE HYDROCHLORIDE 200 MG: 200 TABLET ORAL at 20:33

## 2020-01-01 RX ADMIN — ALLOPURINOL 100 MG: 100 TABLET ORAL at 08:53

## 2020-01-01 RX ADMIN — IRON SUCROSE 200 MG: 20 INJECTION, SOLUTION INTRAVENOUS at 16:07

## 2020-01-01 RX ADMIN — FUROSEMIDE 40 MG: 10 INJECTION, SOLUTION INTRAMUSCULAR; INTRAVENOUS at 08:47

## 2020-01-01 RX ADMIN — SACUBITRIL AND VALSARTAN 1 TABLET: 24; 26 TABLET, FILM COATED ORAL at 21:26

## 2020-01-01 RX ADMIN — PANTOPRAZOLE SODIUM 40 MG: 40 TABLET, DELAYED RELEASE ORAL at 08:53

## 2020-01-01 RX ADMIN — ENOXAPARIN SODIUM 40 MG: 40 INJECTION SUBCUTANEOUS at 15:11

## 2020-01-01 RX ADMIN — BUMETANIDE 1 MG: 1 TABLET ORAL at 09:05

## 2020-01-01 RX ADMIN — CARVEDILOL 12.5 MG: 6.25 TABLET, FILM COATED ORAL at 08:17

## 2020-01-01 RX ADMIN — SODIUM CHLORIDE, PRESERVATIVE FREE 10 ML: 5 INJECTION INTRAVENOUS at 20:54

## 2020-01-01 RX ADMIN — Medication 260 MCG: at 13:35

## 2020-01-01 RX ADMIN — SODIUM CHLORIDE, PRESERVATIVE FREE 10 ML: 5 INJECTION INTRAVENOUS at 08:53

## 2020-01-01 RX ADMIN — PROPOFOL 50 MG: 10 INJECTION, EMULSION INTRAVENOUS at 07:57

## 2020-01-01 RX ADMIN — FUROSEMIDE 40 MG: 10 INJECTION, SOLUTION INTRAMUSCULAR; INTRAVENOUS at 08:27

## 2020-01-01 RX ADMIN — ASPIRIN 81 MG: 81 TABLET ORAL at 08:36

## 2020-01-01 RX ADMIN — VASOPRESSIN 1 UNITS: 20 INJECTION INTRAVENOUS at 13:33

## 2020-01-01 RX ADMIN — ASPIRIN 81 MG: 81 TABLET ORAL at 09:22

## 2020-01-01 RX ADMIN — ALLOPURINOL 100 MG: 100 TABLET ORAL at 20:27

## 2020-01-01 RX ADMIN — HYDRALAZINE HYDROCHLORIDE 10 MG: 10 TABLET ORAL at 05:19

## 2020-01-01 RX ADMIN — AMIODARONE HYDROCHLORIDE 800 MG: 200 TABLET ORAL at 13:35

## 2020-01-01 RX ADMIN — EPHEDRINE SULFATE 15 MG: 50 INJECTION INTRAVENOUS at 12:37

## 2020-01-01 RX ADMIN — BUMETANIDE 1 MG: 1 TABLET ORAL at 20:31

## 2020-01-01 RX ADMIN — PANTOPRAZOLE SODIUM 40 MG: 40 TABLET, DELAYED RELEASE ORAL at 08:33

## 2020-01-01 RX ADMIN — ACETAMINOPHEN 650 MG: 325 TABLET, FILM COATED ORAL at 21:14

## 2020-01-01 RX ADMIN — ASPIRIN 81 MG: 81 TABLET ORAL at 08:27

## 2020-01-01 RX ADMIN — METOLAZONE 2.5 MG: 2.5 TABLET ORAL at 08:32

## 2020-01-01 RX ADMIN — IOPAMIDOL 84 ML: 755 INJECTION, SOLUTION INTRAVENOUS at 12:07

## 2020-01-01 RX ADMIN — BUMETANIDE 1 MG: 0.25 INJECTION INTRAMUSCULAR; INTRAVENOUS at 17:08

## 2020-01-01 RX ADMIN — SODIUM CHLORIDE, PRESERVATIVE FREE 10 ML: 5 INJECTION INTRAVENOUS at 21:17

## 2020-01-01 RX ADMIN — CARVEDILOL 25 MG: 25 TABLET, FILM COATED ORAL at 21:01

## 2020-01-01 RX ADMIN — VANCOMYCIN HYDROCHLORIDE 500 MG: 10 INJECTION, POWDER, LYOPHILIZED, FOR SOLUTION INTRAVENOUS at 10:35

## 2020-01-01 RX ADMIN — CALCITRIOL 0.25 MCG: 0.25 CAPSULE ORAL at 09:22

## 2020-01-01 RX ADMIN — SODIUM CHLORIDE, PRESERVATIVE FREE 10 ML: 5 INJECTION INTRAVENOUS at 20:27

## 2020-01-01 RX ADMIN — PROPOFOL 30 MG: 10 INJECTION, EMULSION INTRAVENOUS at 17:05

## 2020-01-01 RX ADMIN — Medication 260 MCG: at 13:06

## 2020-01-01 RX ADMIN — FAMOTIDINE 40 MG: 20 TABLET, FILM COATED ORAL at 08:47

## 2020-01-01 RX ADMIN — CARVEDILOL 25 MG: 25 TABLET, FILM COATED ORAL at 10:04

## 2020-01-01 RX ADMIN — PANTOPRAZOLE SODIUM 40 MG: 40 TABLET, DELAYED RELEASE ORAL at 21:18

## 2020-01-01 RX ADMIN — Medication 2 G: at 07:57

## 2020-01-01 RX ADMIN — ALLOPURINOL 100 MG: 100 TABLET ORAL at 21:36

## 2020-01-01 RX ADMIN — FERRIC CITRATE 420 MG: 210 TABLET, COATED ORAL at 17:27

## 2020-01-01 RX ADMIN — Medication 160 MCG: at 12:41

## 2020-01-01 RX ADMIN — PERFLUTREN 8.48 MG: 6.52 INJECTION, SUSPENSION INTRAVENOUS at 16:48

## 2020-01-01 RX ADMIN — ALLOPURINOL 100 MG: 100 TABLET ORAL at 08:12

## 2020-01-01 RX ADMIN — SODIUM CHLORIDE, PRESERVATIVE FREE 10 ML: 5 INJECTION INTRAVENOUS at 20:19

## 2020-01-01 RX ADMIN — EPHEDRINE SULFATE 15 MG: 50 INJECTION INTRAVENOUS at 12:54

## 2020-01-01 RX ADMIN — BUMETANIDE 1 MG: 1 TABLET ORAL at 07:58

## 2020-01-01 RX ADMIN — ENOXAPARIN SODIUM 40 MG: 40 INJECTION SUBCUTANEOUS at 16:14

## 2020-01-01 RX ADMIN — HYDROCODONE BITARTRATE AND ACETAMINOPHEN 1 TABLET: 7.5; 325 TABLET ORAL at 05:54

## 2020-01-01 RX ADMIN — ATORVASTATIN CALCIUM 40 MG: 40 TABLET, FILM COATED ORAL at 21:02

## 2020-01-01 RX ADMIN — ALLOPURINOL 100 MG: 100 TABLET ORAL at 21:26

## 2020-01-01 RX ADMIN — Medication 1 TABLET: at 08:12

## 2020-01-01 RX ADMIN — EPHEDRINE SULFATE 15 MG: 50 INJECTION INTRAVENOUS at 13:16

## 2020-01-01 RX ADMIN — CARVEDILOL 25 MG: 25 TABLET, FILM COATED ORAL at 17:44

## 2020-01-01 RX ADMIN — ENOXAPARIN SODIUM 30 MG: 30 INJECTION SUBCUTANEOUS at 17:26

## 2020-01-01 RX ADMIN — Medication 1 TABLET: at 09:14

## 2020-01-01 RX ADMIN — BUMETANIDE 0.5 MG/HR: 0.25 INJECTION INTRAMUSCULAR; INTRAVENOUS at 08:40

## 2020-01-01 RX ADMIN — PANTOPRAZOLE SODIUM 40 MG: 40 TABLET, DELAYED RELEASE ORAL at 21:01

## 2020-01-01 RX ADMIN — CALCITRIOL 0.25 MCG: 0.25 CAPSULE ORAL at 09:05

## 2020-01-01 RX ADMIN — OXYCODONE HYDROCHLORIDE AND ACETAMINOPHEN 2 TABLET: 7.5; 325 TABLET ORAL at 04:17

## 2020-01-01 RX ADMIN — ATORVASTATIN CALCIUM 40 MG: 40 TABLET, FILM COATED ORAL at 20:53

## 2020-01-01 RX ADMIN — EPHEDRINE SULFATE 15 MG: 50 INJECTION INTRAVENOUS at 13:07

## 2020-01-01 RX ADMIN — SODIUM CHLORIDE, PRESERVATIVE FREE 10 ML: 5 INJECTION INTRAVENOUS at 10:24

## 2020-01-01 RX ADMIN — Medication 1 TABLET: at 08:17

## 2020-01-01 RX ADMIN — SODIUM CHLORIDE, PRESERVATIVE FREE 10 ML: 5 INJECTION INTRAVENOUS at 20:48

## 2020-01-01 RX ADMIN — METOLAZONE 2.5 MG: 2.5 TABLET ORAL at 09:01

## 2020-01-01 RX ADMIN — ASPIRIN 81 MG: 81 TABLET ORAL at 08:12

## 2020-01-01 RX ADMIN — SODIUM CHLORIDE, PRESERVATIVE FREE 10 ML: 5 INJECTION INTRAVENOUS at 11:58

## 2020-01-01 RX ADMIN — ALLOPURINOL 100 MG: 100 TABLET ORAL at 10:40

## 2020-01-01 RX ADMIN — BUMETANIDE 1 MG: 1 TABLET ORAL at 17:59

## 2020-01-01 RX ADMIN — ASPIRIN 81 MG: 81 TABLET ORAL at 09:05

## 2020-01-01 RX ADMIN — BUMETANIDE 1 MG: 1 TABLET ORAL at 18:21

## 2020-01-01 RX ADMIN — BUMETANIDE 1 MG: 1 TABLET ORAL at 21:02

## 2020-01-01 RX ADMIN — SODIUM CHLORIDE, PRESERVATIVE FREE 10 ML: 5 INJECTION INTRAVENOUS at 09:05

## 2020-01-01 RX ADMIN — ASPIRIN 81 MG: 81 TABLET ORAL at 08:53

## 2020-01-01 RX ADMIN — CALCITRIOL 0.25 MCG: 0.25 CAPSULE ORAL at 08:36

## 2020-01-01 RX ADMIN — BUMETANIDE 1 MG: 1 TABLET ORAL at 08:16

## 2020-01-01 RX ADMIN — ALLOPURINOL 100 MG: 100 TABLET ORAL at 08:16

## 2020-01-01 RX ADMIN — BUMETANIDE 1 MG: 0.25 INJECTION INTRAMUSCULAR; INTRAVENOUS at 17:18

## 2020-01-01 RX ADMIN — Medication 160 MCG: at 12:22

## 2020-01-01 RX ADMIN — CARVEDILOL 25 MG: 25 TABLET, FILM COATED ORAL at 17:10

## 2020-01-01 RX ADMIN — ALLOPURINOL 100 MG: 100 TABLET ORAL at 08:17

## 2020-01-01 RX ADMIN — IRON SUCROSE 200 MG: 20 INJECTION, SOLUTION INTRAVENOUS at 16:34

## 2020-01-01 RX ADMIN — PANTOPRAZOLE SODIUM 40 MG: 40 TABLET, DELAYED RELEASE ORAL at 08:27

## 2020-01-01 RX ADMIN — AMIODARONE HYDROCHLORIDE 200 MG: 200 TABLET ORAL at 08:16

## 2020-01-01 RX ADMIN — CEFAZOLIN 2 G: 330 INJECTION, POWDER, FOR SOLUTION INTRAMUSCULAR; INTRAVENOUS at 16:56

## 2020-01-01 RX ADMIN — MIDAZOLAM 2 MG: 1 INJECTION INTRAMUSCULAR; INTRAVENOUS at 16:42

## 2020-01-01 RX ADMIN — AMIODARONE HYDROCHLORIDE 200 MG: 200 TABLET ORAL at 21:01

## 2020-01-01 RX ADMIN — AMIODARONE HYDROCHLORIDE 200 MG: 200 TABLET ORAL at 17:07

## 2020-01-01 RX ADMIN — SODIUM CHLORIDE, PRESERVATIVE FREE 10 ML: 5 INJECTION INTRAVENOUS at 09:02

## 2020-01-01 RX ADMIN — PANTOPRAZOLE SODIUM 40 MG: 40 TABLET, DELAYED RELEASE ORAL at 08:16

## 2020-01-01 RX ADMIN — FERRIC CITRATE 420 MG: 210 TABLET, COATED ORAL at 14:25

## 2020-01-01 RX ADMIN — PANTOPRAZOLE SODIUM 40 MG: 40 TABLET, DELAYED RELEASE ORAL at 08:47

## 2020-01-01 RX ADMIN — CALCITRIOL 0.25 MCG: 0.25 CAPSULE ORAL at 09:01

## 2020-01-01 RX ADMIN — VANCOMYCIN HYDROCHLORIDE 1000 MG: 1 INJECTION, SOLUTION INTRAVENOUS at 10:33

## 2020-01-01 RX ADMIN — HYDROCODONE BITARTRATE AND ACETAMINOPHEN 1 TABLET: 7.5; 325 TABLET ORAL at 00:32

## 2020-01-01 RX ADMIN — EPOETIN ALFA-EPBX 10000 UNITS: 10000 INJECTION, SOLUTION INTRAVENOUS; SUBCUTANEOUS at 20:14

## 2020-01-01 RX ADMIN — CARVEDILOL 25 MG: 25 TABLET, FILM COATED ORAL at 20:14

## 2020-01-01 RX ADMIN — Medication 260 MCG: at 12:53

## 2020-01-01 RX ADMIN — SODIUM CHLORIDE, PRESERVATIVE FREE 10 ML: 5 INJECTION INTRAVENOUS at 21:15

## 2020-01-01 RX ADMIN — HYDRALAZINE HYDROCHLORIDE 10 MG: 10 TABLET ORAL at 14:58

## 2020-01-01 RX ADMIN — ATORVASTATIN CALCIUM 40 MG: 40 TABLET, FILM COATED ORAL at 20:33

## 2020-01-01 RX ADMIN — BUMETANIDE 1 MG: 1 TABLET ORAL at 08:12

## 2020-01-01 RX ADMIN — ALLOPURINOL 100 MG: 100 TABLET ORAL at 21:03

## 2020-01-01 RX ADMIN — ALLOPURINOL 100 MG: 100 TABLET ORAL at 08:47

## 2020-01-01 RX ADMIN — ASPIRIN 81 MG: 81 TABLET ORAL at 10:05

## 2020-01-01 RX ADMIN — SODIUM CHLORIDE, PRESERVATIVE FREE 10 ML: 5 INJECTION INTRAVENOUS at 08:40

## 2020-01-01 RX ADMIN — PROPOFOL 30 MG: 10 INJECTION, EMULSION INTRAVENOUS at 17:08

## 2020-01-01 RX ADMIN — SODIUM CHLORIDE 50 ML/HR: 9 INJECTION, SOLUTION INTRAVENOUS at 06:54

## 2020-01-01 RX ADMIN — ACETAMINOPHEN 650 MG: 325 TABLET, FILM COATED ORAL at 08:24

## 2020-01-01 RX ADMIN — HYDRALAZINE HYDROCHLORIDE 10 MG: 10 TABLET ORAL at 22:29

## 2020-01-01 RX ADMIN — SODIUM CHLORIDE, POTASSIUM CHLORIDE, SODIUM LACTATE AND CALCIUM CHLORIDE 1000 ML: 600; 310; 30; 20 INJECTION, SOLUTION INTRAVENOUS at 06:14

## 2020-01-01 RX ADMIN — SODIUM CHLORIDE, PRESERVATIVE FREE 10 ML: 5 INJECTION INTRAVENOUS at 21:03

## 2020-01-01 RX ADMIN — ASPIRIN 81 MG: 81 TABLET ORAL at 10:40

## 2020-01-01 RX ADMIN — SODIUM CHLORIDE, PRESERVATIVE FREE 10 ML: 5 INJECTION INTRAVENOUS at 21:01

## 2020-01-01 RX ADMIN — CALCITRIOL 0.25 MCG: 0.25 CAPSULE ORAL at 08:31

## 2020-01-01 RX ADMIN — ATORVASTATIN CALCIUM 40 MG: 40 TABLET, FILM COATED ORAL at 17:10

## 2020-01-01 RX ADMIN — PANTOPRAZOLE SODIUM 40 MG: 40 TABLET, DELAYED RELEASE ORAL at 10:40

## 2020-01-01 RX ADMIN — AMIODARONE HYDROCHLORIDE 200 MG: 200 TABLET ORAL at 21:18

## 2020-01-01 RX ADMIN — FAMOTIDINE 40 MG: 20 TABLET, FILM COATED ORAL at 08:27

## 2020-01-01 RX ADMIN — VASOPRESSIN 1 UNITS: 20 INJECTION INTRAVENOUS at 13:24

## 2020-01-01 RX ADMIN — HYDROCODONE BITARTRATE AND ACETAMINOPHEN 1 TABLET: 7.5; 325 TABLET ORAL at 08:11

## 2020-01-01 RX ADMIN — ISOSORBIDE MONONITRATE 30 MG: 30 TABLET, EXTENDED RELEASE ORAL at 11:58

## 2020-01-01 RX ADMIN — AMIODARONE HYDROCHLORIDE 200 MG: 200 TABLET ORAL at 21:14

## 2020-01-01 RX ADMIN — SODIUM CHLORIDE, PRESERVATIVE FREE 10 ML: 5 INJECTION INTRAVENOUS at 06:32

## 2020-01-01 RX ADMIN — CALCITRIOL 0.25 MCG: 0.25 CAPSULE ORAL at 08:16

## 2020-01-01 RX ADMIN — FUROSEMIDE 40 MG: 10 INJECTION, SOLUTION INTRAMUSCULAR; INTRAVENOUS at 20:48

## 2020-01-01 RX ADMIN — VANCOMYCIN HYDROCHLORIDE 1500 MG: 10 INJECTION, POWDER, LYOPHILIZED, FOR SOLUTION INTRAVENOUS at 23:50

## 2020-01-01 RX ADMIN — Medication 1 TABLET: at 13:29

## 2020-01-01 RX ADMIN — CALCITRIOL 0.25 MCG: 0.25 CAPSULE ORAL at 08:53

## 2020-01-01 RX ADMIN — SACUBITRIL AND VALSARTAN 1 TABLET: 24; 26 TABLET, FILM COATED ORAL at 21:09

## 2020-01-01 RX ADMIN — PANTOPRAZOLE SODIUM 40 MG: 40 TABLET, DELAYED RELEASE ORAL at 08:11

## 2020-01-01 RX ADMIN — REGADENOSON 0.4 MG: 0.08 INJECTION, SOLUTION INTRAVENOUS at 12:49

## 2020-01-01 RX ADMIN — PROPOFOL 30 MG: 10 INJECTION, EMULSION INTRAVENOUS at 17:03

## 2020-01-01 RX ADMIN — AMIODARONE HYDROCHLORIDE 200 MG: 200 TABLET ORAL at 20:14

## 2020-01-01 RX ADMIN — Medication 160 MCG: at 12:37

## 2020-01-01 RX ADMIN — FERRIC CITRATE 420 MG: 210 TABLET, COATED ORAL at 08:11

## 2020-01-01 RX ADMIN — VASOPRESSIN 1 UNITS: 20 INJECTION INTRAVENOUS at 12:47

## 2020-01-01 RX ADMIN — PANTOPRAZOLE SODIUM 40 MG: 40 TABLET, DELAYED RELEASE ORAL at 09:01

## 2020-01-01 RX ADMIN — EPHEDRINE SULFATE 15 MG: 50 INJECTION INTRAVENOUS at 13:38

## 2020-01-01 RX ADMIN — CALCITRIOL 0.25 MCG: 0.25 CAPSULE ORAL at 09:14

## 2020-01-01 RX ADMIN — SODIUM CHLORIDE, PRESERVATIVE FREE 10 ML: 5 INJECTION INTRAVENOUS at 21:26

## 2020-01-01 RX ADMIN — PROPOFOL 100 MCG/KG/MIN: 10 INJECTION, EMULSION INTRAVENOUS at 12:13

## 2020-01-01 RX ADMIN — ATORVASTATIN CALCIUM 40 MG: 40 TABLET, FILM COATED ORAL at 20:14

## 2020-01-01 RX ADMIN — BUMETANIDE 1 MG: 1 TABLET ORAL at 17:12

## 2020-01-01 RX ADMIN — ALLOPURINOL 100 MG: 100 TABLET ORAL at 08:36

## 2020-01-01 RX ADMIN — ATORVASTATIN CALCIUM 40 MG: 40 TABLET, FILM COATED ORAL at 20:18

## 2020-01-01 RX ADMIN — SODIUM CHLORIDE, PRESERVATIVE FREE 10 ML: 5 INJECTION INTRAVENOUS at 09:22

## 2020-01-01 RX ADMIN — HYDROCODONE BITARTRATE AND ACETAMINOPHEN 1 TABLET: 7.5; 325 TABLET ORAL at 21:31

## 2020-01-01 RX ADMIN — ALLOPURINOL 100 MG: 100 TABLET ORAL at 20:14

## 2020-01-01 RX ADMIN — CARVEDILOL 25 MG: 25 TABLET, FILM COATED ORAL at 09:05

## 2020-01-01 RX ADMIN — PANTOPRAZOLE SODIUM 40 MG: 40 TABLET, DELAYED RELEASE ORAL at 20:18

## 2020-01-01 RX ADMIN — FENTANYL CITRATE 100 MCG: 50 INJECTION, SOLUTION INTRAMUSCULAR; INTRAVENOUS at 12:13

## 2020-01-01 RX ADMIN — Medication 160 MCG: at 12:45

## 2020-01-01 RX ADMIN — EPHEDRINE SULFATE 15 MG: 50 INJECTION INTRAVENOUS at 12:45

## 2020-01-01 RX ADMIN — CARVEDILOL 25 MG: 25 TABLET, FILM COATED ORAL at 08:31

## 2020-01-01 RX ADMIN — FENTANYL CITRATE 50 MCG: 50 INJECTION, SOLUTION INTRAMUSCULAR; INTRAVENOUS at 07:51

## 2020-01-01 RX ADMIN — BUMETANIDE 1 MG: 1 TABLET ORAL at 09:14

## 2020-01-01 RX ADMIN — PANTOPRAZOLE SODIUM 40 MG: 40 TABLET, DELAYED RELEASE ORAL at 21:03

## 2020-01-01 RX ADMIN — HYDROCODONE BITARTRATE AND ACETAMINOPHEN 1 TABLET: 7.5; 325 TABLET ORAL at 12:03

## 2020-01-01 RX ADMIN — PANTOPRAZOLE SODIUM 40 MG: 40 TABLET, DELAYED RELEASE ORAL at 20:53

## 2020-01-01 RX ADMIN — ATORVASTATIN CALCIUM 40 MG: 40 TABLET, FILM COATED ORAL at 08:47

## 2020-01-01 RX ADMIN — ALLOPURINOL 100 MG: 100 TABLET ORAL at 09:14

## 2020-01-01 RX ADMIN — PROPOFOL 30 MG: 10 INJECTION, EMULSION INTRAVENOUS at 16:57

## 2020-01-01 RX ADMIN — CARVEDILOL 12.5 MG: 6.25 TABLET, FILM COATED ORAL at 20:34

## 2020-01-01 RX ADMIN — CARVEDILOL 25 MG: 25 TABLET, FILM COATED ORAL at 17:51

## 2020-01-01 RX ADMIN — SODIUM CHLORIDE, PRESERVATIVE FREE 10 ML: 5 INJECTION INTRAVENOUS at 08:28

## 2020-01-01 RX ADMIN — ENOXAPARIN SODIUM 40 MG: 40 INJECTION SUBCUTANEOUS at 17:10

## 2020-01-01 RX ADMIN — AMIODARONE HYDROCHLORIDE 200 MG: 200 TABLET ORAL at 08:12

## 2020-01-01 RX ADMIN — CARVEDILOL 25 MG: 25 TABLET, FILM COATED ORAL at 21:02

## 2020-01-01 RX ADMIN — CARVEDILOL 25 MG: 25 TABLET, FILM COATED ORAL at 09:22

## 2020-01-01 RX ADMIN — CARVEDILOL 25 MG: 25 TABLET, FILM COATED ORAL at 08:53

## 2020-01-01 RX ADMIN — ASPIRIN 81 MG: 81 TABLET ORAL at 08:17

## 2020-01-01 RX ADMIN — ENOXAPARIN SODIUM 30 MG: 30 INJECTION SUBCUTANEOUS at 10:40

## 2020-01-01 RX ADMIN — SACUBITRIL AND VALSARTAN 1 TABLET: 24; 26 TABLET, FILM COATED ORAL at 20:48

## 2020-01-01 RX ADMIN — PANTOPRAZOLE SODIUM 40 MG: 40 TABLET, DELAYED RELEASE ORAL at 21:14

## 2020-01-01 RX ADMIN — PANTOPRAZOLE SODIUM 40 MG: 40 TABLET, DELAYED RELEASE ORAL at 08:36

## 2020-01-01 RX ADMIN — Medication 1 TABLET: at 08:53

## 2020-01-01 RX ADMIN — AMIODARONE HYDROCHLORIDE 200 MG: 200 TABLET ORAL at 16:40

## 2020-01-01 RX ADMIN — PANTOPRAZOLE SODIUM 40 MG: 40 TABLET, DELAYED RELEASE ORAL at 09:05

## 2020-01-01 RX ADMIN — ALLOPURINOL 100 MG: 100 TABLET ORAL at 08:31

## 2020-01-01 RX ADMIN — OXYCODONE HYDROCHLORIDE AND ACETAMINOPHEN 2 TABLET: 7.5; 325 TABLET ORAL at 00:23

## 2020-01-01 RX ADMIN — CARVEDILOL 25 MG: 25 TABLET, FILM COATED ORAL at 17:17

## 2020-01-01 RX ADMIN — CARVEDILOL 25 MG: 25 TABLET, FILM COATED ORAL at 18:05

## 2020-01-01 RX ADMIN — Medication 2 G: at 12:19

## 2020-01-01 RX ADMIN — ALLOPURINOL 100 MG: 100 TABLET ORAL at 20:53

## 2020-01-01 RX ADMIN — CARVEDILOL 12.5 MG: 6.25 TABLET, FILM COATED ORAL at 17:26

## 2020-01-01 RX ADMIN — HEPARIN SODIUM 3000 UNITS: 1000 INJECTION, SOLUTION INTRAVENOUS; SUBCUTANEOUS at 12:57

## 2020-01-01 RX ADMIN — ASPIRIN 81 MG: 81 TABLET ORAL at 17:10

## 2020-01-01 RX ADMIN — LIDOCAINE HYDROCHLORIDE 60 MG: 20 INJECTION, SOLUTION INTRAVENOUS at 16:55

## 2020-01-01 RX ADMIN — HYDROCODONE BITARTRATE AND ACETAMINOPHEN 1 TABLET: 7.5; 325 TABLET ORAL at 23:14

## 2020-01-01 RX ADMIN — ALLOPURINOL 100 MG: 100 TABLET ORAL at 21:01

## 2020-01-01 RX ADMIN — EPOETIN ALFA-EPBX 10000 UNITS: 10000 INJECTION, SOLUTION INTRAVENOUS; SUBCUTANEOUS at 09:13

## 2020-01-01 RX ADMIN — POTASSIUM CHLORIDE 20 MEQ: 750 CAPSULE, EXTENDED RELEASE ORAL at 10:50

## 2020-01-01 RX ADMIN — ENOXAPARIN SODIUM 30 MG: 30 INJECTION SUBCUTANEOUS at 20:31

## 2020-01-01 RX ADMIN — PANTOPRAZOLE SODIUM 40 MG: 40 TABLET, DELAYED RELEASE ORAL at 21:36

## 2020-01-01 RX ADMIN — FENTANYL CITRATE 25 MCG: 50 INJECTION INTRAMUSCULAR; INTRAVENOUS at 11:19

## 2020-01-01 RX ADMIN — FAMOTIDINE 40 MG: 20 TABLET, FILM COATED ORAL at 21:09

## 2020-01-01 RX ADMIN — ALLOPURINOL 100 MG: 100 TABLET ORAL at 09:22

## 2020-01-01 RX ADMIN — ALLOPURINOL 100 MG: 100 TABLET ORAL at 21:14

## 2020-01-01 RX ADMIN — METOLAZONE 2.5 MG: 2.5 TABLET ORAL at 17:17

## 2020-01-01 RX ADMIN — AMIODARONE HYDROCHLORIDE 200 MG: 200 TABLET ORAL at 09:05

## 2020-01-01 RX ADMIN — PANTOPRAZOLE SODIUM 40 MG: 40 TABLET, DELAYED RELEASE ORAL at 21:26

## 2020-01-01 RX ADMIN — HYDROCODONE BITARTRATE AND ACETAMINOPHEN 1 TABLET: 7.5; 325 TABLET ORAL at 03:11

## 2020-01-03 NOTE — PROGRESS NOTES
Referring Provider: Alli Perry MD    Reason for Follow-up Visit: CHF    Subjective .   Chief Complaint:   Chief Complaint   Patient presents with   • Follow-up     hospital fu from a hospital in [facility].  pt went in because he could not breathe   • Coronary Artery Disease     pt states he was having chest pain and felt terrible so he went to the ER.  12/12/19.  he was told he had fluid around his heart.   • Congestive Heart Failure     pt states he was put on a water pill and has felt better.  he had a angiogram and CT scan.  he will need to sign a release for us to get this from the hospital.   • Hyperlipidemia     pt is fasting today to have lab done to check his lipids.  his pcp did not have any recent labs.  last ones done in 2015.       History of present illness:  Jadon Flynn is a 69 y.o. yo male with history of CAD and ischemic cardiomyopathy, recently admitted for CHF in a hospital in [place]. No records are available yet.        History  Past Medical History:   Diagnosis Date   • Asymptomatic bilateral carotid artery stenosis    • CAD (coronary artery disease) 12/07/2016   • CAD in native artery      2009 stents   • Carotid artery disease (CMS/[facility])    • CHF (congestive heart failure) (CMS/[facility]) 12/07/2016   • Chronic combined systolic and diastolic CHF (congestive heart failure) (CMS/[facility])     echo 5/2013 EF 35-40%   • Chronic systolic CHF (congestive heart failure), NYHA class 2 (CMS/[facility])    • CKD (chronic kidney disease) 12/07/2016   • CKD (chronic kidney disease), stage III (CMS/[facility])    • COPD, group B, by GOLD 2017 classification (CMS/[facility]) 11/20/2018   • Essential hypertension    • Gout    • HLD (hyperlipidemia) 12/07/2016   • HTN (hypertension) 12/07/2016   • Hyperkalemia    • Hyperlipidemia, unspecified    • Ischemic heart disease 12/07/2016   • Ischemic heart disease    • NSTEMI (non-ST elevated myocardial infarction) (CMS/[facility]) 12/28/2018   • Peripheral  vascular disease (CMS/[facility])    • Pinched nerve in neck    • PVD (peripheral vascular disease) (CMS/[facility]) 12/07/2016   • S/P implantation of automatic cardioverter/defibrillator (AICD) 12/07/2016   • S/P implantation of automatic cardioverter/defibrillator (AICD)    • Stroke (CMS/[facility])    ,   Past Surgical History:   Procedure Laterality Date   • CARDIAC CATHETERIZATION N/A 12/28/2018    Procedure: Coronary angiography;  Surgeon: John Mckeon MD;  Location:  [facility] CATH INVASIVE LOCATION;  Service: Cardiology   • CARDIAC DEFIBRILLATOR PLACEMENT     • CAROTID ENDARTERECTOMY     • CARPAL TUNNEL RELEASE Right    • CORONARY STENT PLACEMENT      x2   • FINGER SURGERY Right     tendon repair    • HYDROCELE EXCISION / REPAIR     • ILIAC ARTERY STENT     • INSERT / REPLACE / REMOVE PACEMAKER  2011   • ARLIN PROCEDURE     • TOTAL KNEE ARTHROPLASTY Left    • VARICOSE VEIN SURGERY Right 7/11/2018    Procedure: RIGHT SAPHENOUS VEIN RADIO FREQUENCY ABLATION;  Surgeon: Dave Chavarria DO;  Location: [facility] HYBRID OR 12;  Service: Vascular   ,   Family History   Problem Relation Age of Onset   • Cancer Father    • Heart disease Mother    • Diabetes Mother    • Sleep apnea Mother    • Hypertension Mother    • Coronary artery disease Other    • Heart disease Brother    • Kidney failure Brother    • Diabetes Brother    • Lung cancer Brother    ,   Social History     Tobacco Use   • Smoking status: Former Smoker     Packs/day: 1.00     Years: 35.00     Pack years: 35.00     Types: Cigarettes     Start date: 1967     Last attempt to quit: 2010     Years since quitting: 10.0   • Smokeless tobacco: Never Used   Substance Use Topics   • Alcohol use: Not Currently     Comment: quit 2008   • Drug use: Never   ,     Medications  Current Outpatient Medications   Medication Sig Dispense Refill   • allopurinol (ZYLOPRIM) 100 MG tablet Take 100 mg by mouth 2 (Two) Times a Day.     • aspirin 81 MG EC tablet Take 81 mg by mouth Daily.     •  "atorvastatin (LIPITOR) 40 MG tablet Take 1 tablet by mouth Daily. 90 tablet 3   • carvedilol (COREG) 25 MG tablet TAKE 1 TABLET TWICE A DAY WITH MEALS 180 tablet 3   • furosemide (LASIX) 40 MG tablet TAKE 1 TABLET DAILY 90 tablet 4   • HYDROcodone-acetaminophen (NORCO) 7.5-325 MG per tablet take 1 tablet by mouth three times a day if needed for pain -FILL DATE 11-  0   • pantoprazole (PROTONIX) 40 MG EC tablet Take 40 mg by mouth Daily.     • ticagrelor (BRILINTA) 90 MG tablet tablet Take 1 tablet by mouth 2 (Two) Times a Day. 180 tablet 3   • albuterol sulfate  (90 Base) MCG/ACT inhaler Inhale 2 puffs Every 4 (Four) Hours As Needed for Wheezing. 54 g 3     No current facility-administered medications for this visit.        Allergies:  Patient has no known allergies.    Review of Systems  Review of Systems   HENT: Negative for nosebleeds.    Cardiovascular: Positive for dyspnea on exertion. Negative for chest pain, claudication, irregular heartbeat, leg swelling, near-syncope, orthopnea, palpitations, paroxysmal nocturnal dyspnea and syncope.   Respiratory: Positive for shortness of breath. Negative for cough and hemoptysis.    Gastrointestinal: Negative for dysphagia, hematemesis and melena.   Genitourinary: Negative for hematuria.   All other systems reviewed and are negative.      Objective     Physical Exam:  BP 98/50   Pulse 74   Ht 191.8 cm (75.5\")   Wt 92.1 kg (203 lb)   SpO2 98%   BMI 25.04 kg/m²   Physical Exam   Constitutional: He is oriented to person, place, and time. He appears well-nourished. No distress.   HENT:   Head: Normocephalic.   Eyes: No scleral icterus.   Neck: Normal range of motion. Neck supple.   Cardiovascular: Normal rate, regular rhythm and normal heart sounds. Exam reveals no gallop and no friction rub.   No murmur heard.  Pulmonary/Chest: Effort normal and breath sounds normal. No respiratory distress. He has no wheezes. He has no rales.   Abdominal: Soft. Bowel " sounds are normal. He exhibits no distension. There is no tenderness.   Musculoskeletal: He exhibits no edema.   Neurological: He is alert and oriented to person, place, and time.   Skin: Skin is warm and dry. He is not diaphoretic. No erythema.   Psychiatric: He has a normal mood and affect. His behavior is normal.       Results Review:    ECG 12 Lead  Date/Time: 1/3/2020 10:40 AM  Performed by: John Mckeon MD  Authorized by: John Mckeon MD   Comparison: compared with previous ECG   Similar to previous ECG  Rhythm: sinus rhythm  Ectopy: unifocal PVCs  Rate: normal  Conduction: left bundle branch block  QRS axis: normal    Clinical impression: abnormal EKG            Office Visit on 10/01/2019   Component Date Value Ref Range Status   • FEV1 10/01/2019 67%  liters Final   • FVC 10/01/2019 71%  liters Final   • FEV1/FVC 10/01/2019 74.22%  % Final       Assessment/Plan   Jadon was seen today for follow-up, coronary artery disease, congestive heart failure and hyperlipidemia.    Diagnoses and all orders for this visit:    Coronary artery disease involving native coronary artery of native heart without angina pectoris, The patient denies chest pain, shortness of breath, dyspnea on exertion, orthopnea, PND, edema. There is no evidence of ongoing ischemia    S/P implantation of automatic cardioverter/defibrillator (AICD), normal function    Mixed hyperlipidemia, adequate response to statin    Chronic combined systolic and diastolic congestive heart failure (CMS/HCC), will obtain records and see what else needs to be done. Cont the same meds for now. Will probably need upgrade to a BiV device in the future        Patient's Body mass index is 25.04 kg/m². BMI is within normal parameters. No follow-up required..

## 2020-01-07 NOTE — TELEPHONE ENCOUNTER
Faxed release of info to get records from a hospital visit at [facility] in [place].  Gerald Back, CMA

## 2020-01-16 NOTE — TELEPHONE ENCOUNTER
This pt came by the office in [facility] today stating that he was d/c today from [facility] for abnormal LFT. Dr Lund and Marva Crowe seen him in consult. He was discharge with Entresto. He said Entresto needs PA. This a current pt of Dr. Mckeon. Medical records from [facility] are scanned under Media.

## 2020-01-17 NOTE — PROGRESS NOTES
Single Chamber AICD Evaluation Report  [facility]    January 16, 2020    Primary Cardiologist: Mike  : Guidant Model: Teligen  Implant date: 11/9/11    Reason for evaluation: routine  Indication for AICD: ischemic cardiomyopathy    Measurements  Ventricular sensing - R wave: 2.6 mV  Ventricular threshold: n/r  Ventricular lead impedance: 356 ohms  Shock Impedance: RV 42 ohms        Diagnostic Data  Paced: 1 %  Other: Few episodes of NSVT noted.  The longest is 17 sec.  No tx delivered.  Battery status: satisfactory     Final Parameters  Mode: VVI  Lower rate: 50 bpm   Ventricular - Amplitude: 5.0 V   Pulse width: 0.6 ms   Sensitivity: 0.6 mV   Changes made: n/a  Conclusions: normal AICD function    Follow up: 3 months

## 2020-01-18 PROBLEM — I50.23 ACUTE ON CHRONIC SYSTOLIC HEART FAILURE (HCC): Status: ACTIVE | Noted: 2020-01-01

## 2020-01-18 PROBLEM — I27.20 MODERATE TO SEVERE PULMONARY HYPERTENSION (HCC): Status: ACTIVE | Noted: 2020-01-01

## 2020-01-18 PROBLEM — I47.29 NON-SUSTAINED VENTRICULAR TACHYCARDIA (HCC): Status: ACTIVE | Noted: 2020-01-01

## 2020-01-18 NOTE — H&P
[facility] Medicine Services  HISTORY AND PHYSICAL    Date of Admission: 1/18/2020  Primary Care Physician: Alli Perry MD    Subjective     Chief Complaint: shortness of breath    History of Present Illness    Patient is a 69-year-old male with a past medical history of COPD, systolic heart failure, chronic kidney disease, and a recent hospitalization for GI bleed.  Patient reports that he got up the hospital on Thursday after a GI bleed.  Patient reportedly received IV fluids as well as 2 units of packed red blood cells.  Patient was subsequently discharged on a Thursday, and then on Friday he noted that his lower extremities were significantly swollen and he was having worsening shortness of breath.  Last night he was unable to lie flat.  He complained of orthopnea and paroxysmal nocturnal dyspnea.  He even tried to sleep on the recliner on the couch without any improvement.  His bed sets up and he was not able to set up his bed in order to sleep.  Patient indicated that he got a dose of Lasix at the emergency department, is already feeling somewhat better.  Patient denies any nausea vomiting or diarrhea.  Patient denies any fever or chills.  Patient denies any recent sick contacts.    Normally patient wears 2 L nasal cannula at bedtime.  He has had to increase it up to 3-1/2 without any significant improvement of his shortness of breath.     Review and summary of the outside hospital record shows, the patient arrived with a temperature of 97.4, BP of 113/78, heart rate of 91, respiratory rate of 18, and O2 sat of 97%.  Patient was given 60 mg of IV Lasix at approximately 10:30 AM.  Patient had a lactic acid of 0.8, ABG showed pH of 7.4, PCO2 of 32, PO2 of 74.  Patient's white count was 9.9, hemoglobin of 11.4, platelet count of 312.  CMP showed sodium of 137, potassium 4.5, creatinine of 2.51 (discharged with a creatinine 1.9 on Thursday).  Patient was found to have  normal LFTs and normal bilirubin.  Patient had a proBNP of 15,137, and a troponin that was within normal limits.  His EKG showed a 4 beat run of VT.  Patient has a defibrillator in place.  Chest x-ray was unremarkable, showed no indication of heart failure per the official read, although the ER physician indicated that it did show heart failure.    Also requested the records from his recent hospitalization.  Patient reported arrival in 1/12/2020 with abdominal pain and black stools.  Patient was on dual antiplatelet therapy at that time.  Abdominal pain was epigastric.  Patient about 6 months ago had a GI bleed, and required 6 units of PRBCs.  He had a repeat scope in October 2019 that was reportedly normal.  At that time he had arrived with a hemoglobin of 8.7.  Patient received 2 packs of red blood cells.  Patient also received several units of packed red blood cells.  Patient reportedly underwent upper endoscopy and was found to have a gastric ulcer and hiatal hernia.  They took the patient off of both aspirin and Brilinta at that time, with plans to put the patient back on aspirin soon.    Patient has had 3 large urination into the urinal since lasix.      Review of Systems   Constitutional: Positive for activity change and fatigue. Negative for appetite change, chills, diaphoresis and fever.   Respiratory: Positive for shortness of breath.    Cardiovascular: Positive for leg swelling. Negative for chest pain and palpitations.   Gastrointestinal: Negative for abdominal distention, abdominal pain, constipation, diarrhea, nausea and vomiting.   All other systems reviewed and are negative.       Otherwise complete ROS reviewed and negative except as mentioned in the HPI.    Past Medical History:   Past Medical History:   Diagnosis Date   • Asymptomatic bilateral carotid artery stenosis    • CAD (coronary artery disease) 12/07/2016   • CAD in native artery      2009 stents   • Carotid artery disease (CMS/[facility])    •  CHF (congestive heart failure) (CMS/HCC) 12/07/2016   • Chronic combined systolic and diastolic CHF (congestive heart failure) (CMS/HCC)     echo 5/2013 EF 35-40%   • Chronic systolic CHF (congestive heart failure), NYHA class 2 (CMS/HCC)    • CKD (chronic kidney disease) 12/07/2016   • CKD (chronic kidney disease), stage III (CMS/HCC)    • COPD, group B, by GOLD 2017 classification (CMS/HCC) 11/20/2018   • Essential hypertension    • Gout    • HLD (hyperlipidemia) 12/07/2016   • HTN (hypertension) 12/07/2016   • Hyperkalemia    • Hyperlipidemia, unspecified    • Ischemic heart disease 12/07/2016   • Ischemic heart disease    • NSTEMI (non-ST elevated myocardial infarction) (CMS/HCC) 12/28/2018   • Peripheral vascular disease (CMS/HCC)    • Pinched nerve in neck    • PVD (peripheral vascular disease) (CMS/HCC) 12/07/2016   • S/P implantation of automatic cardioverter/defibrillator (AICD) 12/07/2016   • S/P implantation of automatic cardioverter/defibrillator (AICD)    • Stroke (CMS/HCC)      Past Surgical History:  Past Surgical History:   Procedure Laterality Date   • CARDIAC CATHETERIZATION N/A 12/28/2018    Procedure: Coronary angiography;  Surgeon: John Mckeon MD;  Location: [facility] CATH INVASIVE LOCATION;  Service: Cardiology   • CARDIAC DEFIBRILLATOR PLACEMENT     • CAROTID ENDARTERECTOMY     • CARPAL TUNNEL RELEASE Right    • CORONARY STENT PLACEMENT      x2   • FINGER SURGERY Right     tendon repair    • HYDROCELE EXCISION / REPAIR     • ILIAC ARTERY STENT     • INSERT / REPLACE / REMOVE PACEMAKER  2011   • ARLIN PROCEDURE     • TOTAL KNEE ARTHROPLASTY Left    • VARICOSE VEIN SURGERY Right 7/11/2018    Procedure: RIGHT SAPHENOUS VEIN RADIO FREQUENCY ABLATION;  Surgeon: Dave Chavarria DO;  Location: [facility] OR 12;  Service: Vascular     Social History:  reports that he quit smoking about 10 years ago. His smoking use included cigarettes. He started smoking about 53 years ago. He has a 35.00  pack-year smoking history. He has never used smokeless tobacco. He reports that he drank alcohol. He reports that he does not use drugs.    Family History: family history includes Cancer in his father; Coronary artery disease in an other family member; Diabetes in his brother and mother; Heart disease in his brother and mother; Hypertension in his mother; Kidney failure in his brother; Lung cancer in his brother; Sleep apnea in his mother.      Allergies:  No Known Allergies  Medications:  Prior to Admission medications    Medication Sig Start Date End Date Taking? Authorizing Provider   famotidine (PEPCID) 40 MG tablet Take 40 mg by mouth 2 (Two) Times a Day.   Yes Rd Sanchez MD   Multiple Vitamins-Minerals (ONE-A-DAY MENS 50+ ADVANTAGE) tablet Take 1 tablet by mouth Daily.   Yes Rd Sanchez MD   sacubitril-valsartan (ENTRESTO) 24-26 MG tablet Take 1 tablet by mouth 2 (Two) Times a Day.   Yes Rd Sanchez MD   albuterol sulfate  (90 Base) MCG/ACT inhaler Inhale 2 puffs Every 4 (Four) Hours As Needed for Wheezing. 6/6/19   Katie Parra APRN   allopurinol (ZYLOPRIM) 100 MG tablet Take 100 mg by mouth 2 (Two) Times a Day.    ProviderRd MD   aspirin 81 MG EC tablet Take 81 mg by mouth Daily.    ProviderRd MD   atorvastatin (LIPITOR) 40 MG tablet Take 1 tablet by mouth Daily. 12/30/18   Yessi Daniels APRN   carvedilol (COREG) 25 MG tablet TAKE 1 TABLET TWICE A DAY WITH MEALS 6/12/19   Knees, Sisi EOLI   furosemide (LASIX) 40 MG tablet TAKE 1 TABLET DAILY  Patient taking differently: 40 mg 2 (Two) Times a Day. 8/2/19   Sisi Rutledge APRN   HYDROcodone-acetaminophen (NORCO) 7.5-325 MG per tablet take 1 tablet by mouth three times a day if needed for pain -FILL DATE 11- 11/23/16   Rd Sanchez MD   pantoprazole (PROTONIX) 40 MG EC tablet Take 40 mg by mouth 2 (Two) Times a Day.    Rd Sanchez MD   ticagrelor (BRILINTA) 90 MG  "tablet tablet Take 1 tablet by mouth 2 (Two) Times a Day. 12/30/18   Mynor Danielscaitlin MARTÍNEZ, APRN     Objective     Vital Signs: /56 (BP Location: Left arm, Patient Position: Sitting)   Pulse 99   Temp 97.7 °F (36.5 °C) (Oral)   Resp 20   Ht 193 cm (76\")   Wt 97.8 kg (215 lb 9.6 oz)   SpO2 99%   BMI 26.24 kg/m²   Physical Exam   Constitutional: He is oriented to person, place, and time. No distress.   HENT:   Head: Normocephalic and atraumatic.   Eyes: Conjunctivae are normal. No scleral icterus.   Neck: No JVD present.   Cardiovascular: Normal rate, regular rhythm and intact distal pulses.   Murmur heard.  Pulmonary/Chest: Effort normal. He has rales.   Abdominal: Soft. Bowel sounds are normal. He exhibits no distension and no mass. There is no tenderness. There is no guarding.   Musculoskeletal: He exhibits edema.   Neurological: He is alert and oriented to person, place, and time.   Skin: Skin is warm and dry. Capillary refill takes 2 to 3 seconds. No rash noted. He is not diaphoretic. No erythema. There is pallor.   Psychiatric: He has a normal mood and affect. His behavior is normal.   Nursing note and vitals reviewed.          Results Reviewed:  Lab Results (last 24 hours)     Procedure Component Value Units Date/Time    Protime-INR [[id number]]  (Abnormal) Collected:  01/18/20 1448    Specimen:  Blood Updated:  01/18/20 1505     Protime 15.4 Seconds      INR 1.18    CBC & Differential [[id number]] Collected:  01/18/20 1448    Specimen:  Blood Updated:  01/18/20 1458    Narrative:       The following orders were created for panel order CBC & Differential.  Procedure                               Abnormality         Status                     ---------                               -----------         ------                     CBC Auto Differential[[id number]]        Abnormal            Final result                 Please view results for these tests on the individual orders.    CBC Auto Differential " [[id number]]  (Abnormal) Collected:  01/18/20 1448    Specimen:  Blood Updated:  01/18/20 1458     WBC 10.22 10*3/mm3      RBC 3.58 10*6/mm3      Hemoglobin 11.1 g/dL      Hematocrit 33.7 %      MCV 94.1 fL      MCH 31.0 pg      MCHC 32.9 g/dL      RDW 20.6 %      RDW-SD 70.4 fl      MPV 14.0 fL      Platelets 304 10*3/mm3      Neutrophil % 71.1 %      Lymphocyte % 14.2 %      Monocyte % 9.4 %      Eosinophil % 4.0 %      Basophil % 0.7 %      Neutrophils, Absolute 7.27 10*3/mm3      Lymphocytes, Absolute 1.45 10*3/mm3      Monocytes, Absolute 0.96 10*3/mm3      Eosinophils, Absolute 0.41 10*3/mm3      Basophils, Absolute 0.07 10*3/mm3     Hemoglobin A1c [[id number]] Collected:  01/18/20 1448    Specimen:  Blood Updated:  01/18/20 1452    Troponin [[id number]] Collected:  01/18/20 1448    Specimen:  Blood Updated:  01/18/20 1452    Comprehensive Metabolic Panel [[id number]] Collected:  01/18/20 1448    Specimen:  Blood Updated:  01/18/20 1452    BNP [[id number]] Collected:  01/18/20 1448    Specimen:  Blood Updated:  01/18/20 1452    TSH [[id number]] Collected:  01/18/20 1448    Specimen:  Blood Updated:  01/18/20 1452        Imaging Results (Last 24 Hours)     ** No results found for the last 24 hours. **        I have personally reviewed and interpreted the radiology studies and ECG obtained at time of admission.     Assessment / Plan     Assessment:   Active Hospital Problems    Diagnosis   • **Acute on chronic systolic heart failure (CMS/HCC)   • Severe pulmonary hypertension (CMS/HCC)   • Non-sustained ventricular tachycardia (CMS/HCC)   • COPD, group B, by GOLD 2017 classification (CMS/HCC)   • S/P implantation of automatic cardioverter/defibrillator (AICD)   • PVD (peripheral vascular disease) (CMS/HCC)   • Ischemic cardiomyopathy   • Coronary artery disease involving native coronary artery of native heart without angina pectoris     3.0 x28 mm Xience MELISA to the proximal LAD in 12/2018     • CKD (chronic kidney  disease)   • HTN (hypertension)   • HLD (hyperlipidemia)         Plan:   1.  Admit to telemetry   2.  CXR 2-view  3.  Echo   4.  IV furosemide 40 mg   5.  Cardiology consultation   6.  CBC, CMP, BNP, TSH, A1c, INR, Troponin    7.  Strict I/O, daily weights  8.  Hold brilenta with recent GI bleed - Continue ASA  9.  Resume cardiac medications as appropriate   10.  Request ICD interrogation   11.  Lovenox DVT PPx  12.  Cardiac Diet   13.  ECG at OSH had NSVT - Evaluate ICD and will consider EP vs. Amiodarone   14.  O2 PRN       Code Status: Full     I discussed the patient's findings and my recommendations with the patient and bedside RN     Estimated length of stay 2-4     [place] MD   01/18/20   3:08 PM

## 2020-01-19 NOTE — PLAN OF CARE
Problem: Patient Care Overview  Goal: Plan of Care Review  1/19/2020 0410 by Tracy Mullins RNA  Outcome: Ongoing (interventions implemented as appropriate)  Flowsheets (Taken 1/19/2020 0410)  Progress: improving  Plan of Care Reviewed With: patient  Outcome Summary: Pt c/o leg pain, given prn pain medication with relief. VSS. Sinus 75-96 on tele. A&O. Up ad collins. Cardio consult today. Continue to monitor.     Problem: Patient Care Overview  Goal: Individualization and Mutuality  1/19/2020 0410 by Tracy Mullins, RNA  Outcome: Ongoing (interventions implemented as appropriate)  Flowsheets (Taken 1/19/2020 0410)  Patient Specific Preferences: Likes ice water at the bedside     Problem: Pain, Chronic (Adult)  Goal: Identify Related Risk Factors and Signs and Symptoms  1/19/2020 0410 by Tracy Mullins, RNA  Outcome: Ongoing (interventions implemented as appropriate)     Problem: Cardiac: Heart Failure (Adult)  Goal: Signs and Symptoms of Listed Potential Problems Will be Absent, Minimized or Managed (Cardiac: Heart Failure)  1/19/2020 0415 by Tracy Mullins, RNA  Outcome: Ongoing (interventions implemented as appropriate)  Flowsheets (Taken 1/19/2020 0415)  Problems Assessed (Heart Failure): all  Problems Present (Heart Failure): situational response

## 2020-01-19 NOTE — CONSULTS
LOS: 1 day   Patient Care Team:  Alli Perry MD as PCP - General  Alli Perry MD as PCP - Family Medicine  John Mckeon MD as Cardiologist (Cardiology)  Katie Parra APRN as Nurse Practitioner (Pulmonary Disease)  John Cantu MD as Cardiologist (Cardiac Electrophysiology)    Chief Complaint:   Acute on chronic systolic heart failure (CMS/HCC)    PVD (peripheral vascular disease) (CMS/HCC)    Coronary artery disease involving native coronary artery of native heart without angina pectoris    S/P implantation of automatic cardioverter/defibrillator (AICD)    HLD (hyperlipidemia)    Ischemic cardiomyopathy    HTN (hypertension)    CKD (chronic kidney disease)    COPD, group B, by GOLD 2017 classification (CMS/HCC)    Severe pulmonary hypertension (CMS/HCC)    Non-sustained ventricular tachycardia (CMS/HCC)         Subjective    Jadon Flynn is a 69 y.o. malewho is being seen in consultation.  Patient presents with shortness of breath  He has not been feeling well  Was admitted to hospital in [place] around [place].  He was discharged home now presented with increasing shortness of breath weakness fatigue orthopnea and bipedal edema  Since admission he has improved substantially  Results of echocardiogram as below  He sees Dr. Mckeon  He has chronically depressed left ventricular ejection fraction with interventricular conduction defect on EKG  Latest EKG shows sinus rhythm with IVCD and premature ventricular contractions  His leg swelling has improved markedly  No palpitation  No presyncope syncope  Denies any chest pain  Oral intake is fair  Satisfactory bowel and bladder activity  Ambulating more  Hemodynamically stable  Chronic kidney disease  Troponin elevated with a flat trajectory not consistent with acute coronary syndrome as below    Component      Latest Ref Rng & Units 1/18/2020 1/18/2020 1/19/2020           2:48 PM  8:17 PM    Troponin T      0.000 - 0.030 ng/mL 0.028  0.030 0.038 ()     Telemetry: no malignant arrhythmia. No significant pauses.    Review of Systems     Constitutional: No chills   Has fatigue   No fever.     HENT: Negative.    Eyes: Negative.      Respiratory: Negative for cough,   No chest wall soreness,   Shortness of breath,   no wheezing, no stridor.      Cardiovascular: As above    Gastrointestinal: Negative for abdominal distention,  No abdominal pain,   No blood in stool,   No constipation,   No diarrhea,   No nausea   No vomiting.     Endocrine: Negative.    Genitourinary: Negative for difficulty urinating, dysuria, flank pain and hematuria.     Musculoskeletal: Negative.    Skin: Negative for rash and wound.   Allergic/Immunologic: Negative.      Neurological: Negative for dizziness, syncope, weakness,   No light-headedness  No  headaches.     Hematological: Does not bruise/bleed easily.     Psychiatric/Behavioral: Negative for agitation or behavioral problems,   No confusion,   the patient is  nervous/anxious.       History:   Past Medical History:   Diagnosis Date   • Asymptomatic bilateral carotid artery stenosis    • CAD (coronary artery disease) 12/07/2016   • CAD in native artery      2009 stents   • Carotid artery disease (CMS/[facility])    • CHF (congestive heart failure) (CMS/[facility]) 12/07/2016   • Chronic combined systolic and diastolic CHF (congestive heart failure) (CMS/[facility])     echo 5/2013 EF 35-40%   • Chronic systolic CHF (congestive heart failure), NYHA class 2 (CMS/[facility])    • CKD (chronic kidney disease) 12/07/2016   • CKD (chronic kidney disease), stage III (CMS/[facility])    • COPD, group B, by GOLD 2017 classification (CMS/[facility]) 11/20/2018   • Essential hypertension    • Gout    • HLD (hyperlipidemia) 12/07/2016   • HTN (hypertension) 12/07/2016   • Hyperkalemia    • Hyperlipidemia, unspecified    • Ischemic heart disease 12/07/2016   • Ischemic heart disease    • NSTEMI (non-ST elevated myocardial infarction) (CMS/[facility]) 12/28/2018   • Peripheral  vascular disease (CMS/[facility])    • Pinched nerve in neck    • PVD (peripheral vascular disease) (CMS/HCC) 12/07/2016   • S/P implantation of automatic cardioverter/defibrillator (AICD) 12/07/2016   • S/P implantation of automatic cardioverter/defibrillator (AICD)    • Stroke (CMS/[facility])      Past Surgical History:   Procedure Laterality Date   • CARDIAC CATHETERIZATION N/A 12/28/2018    Procedure: Coronary angiography;  Surgeon: John Mckeon MD;  Location: [facility] CATH INVASIVE LOCATION;  Service: Cardiology   • CARDIAC DEFIBRILLATOR PLACEMENT     • CAROTID ENDARTERECTOMY     • CARPAL TUNNEL RELEASE Right    • CORONARY STENT PLACEMENT      x2   • FINGER SURGERY Right     tendon repair    • HYDROCELE EXCISION / REPAIR     • ILIAC ARTERY STENT     • INSERT / REPLACE / REMOVE PACEMAKER  2011   • ARLIN PROCEDURE     • TOTAL KNEE ARTHROPLASTY Left    • VARICOSE VEIN SURGERY Right 7/11/2018    Procedure: RIGHT SAPHENOUS VEIN RADIO FREQUENCY ABLATION;  Surgeon: Dave Chavarria DO;  Location: [facility] HYBRID OR 12;  Service: Vascular     Social History     Socioeconomic History   • Marital status:      Spouse name: Not on file   • Number of children: Not on file   • Years of education: Not on file   • Highest education level: Not on file   Tobacco Use   • Smoking status: Former Smoker     Packs/day: 1.00     Years: 35.00     Pack years: 35.00     Types: Cigarettes     Start date: 1967     Last attempt to quit: 2010     Years since quitting: 10.0   • Smokeless tobacco: Never Used   Substance and Sexual Activity   • Alcohol use: Not Currently     Comment: quit 2008   • Drug use: Never   • Sexual activity: Defer     Family History   Problem Relation Age of Onset   • Cancer Father    • Heart disease Mother    • Diabetes Mother    • Sleep apnea Mother    • Hypertension Mother    • Coronary artery disease Other    • Heart disease Brother    • Kidney failure Brother    • Diabetes Brother    • Lung cancer Brother         Labs:  WBC WBC   Date Value Ref Range Status   01/18/2020 10.22 3.40 - 10.80 10*3/mm3 Final      HGB Hemoglobin   Date Value Ref Range Status   01/18/2020 11.1 (L) 13.0 - 17.7 g/dL Final      HCT Hematocrit   Date Value Ref Range Status   01/18/2020 33.7 (L) 37.5 - 51.0 % Final      Platelets Platelets   Date Value Ref Range Status   01/18/2020 304 140 - 450 10*3/mm3 Final      MCV MCV   Date Value Ref Range Status   01/18/2020 94.1 79.0 - 97.0 fL Final        Results from last 7 days   Lab Units 01/18/20  1448   SODIUM mmol/L 139   POTASSIUM mmol/L 4.5   CHLORIDE mmol/L 103   CO2 mmol/L 24.0   BUN mg/dL 59*   CREATININE mg/dL 2.31*   CALCIUM mg/dL 8.9   BILIRUBIN mg/dL 1.5*   ALK PHOS U/L 125*   ALT (SGPT) U/L 70*   AST (SGOT) U/L 53*   GLUCOSE mg/dL 106*     Lab Results   Component Value Date    TROPONINI 0.475 (H) 12/29/2018    TROPONINT 0.038 (C) 01/19/2020     PT/INR:    Protime   Date Value Ref Range Status   01/18/2020 15.4 (H) 11.9 - 14.6 Seconds Final   /  INR   Date Value Ref Range Status   01/18/2020 1.18 (H) 0.91 - 1.09 Final       Imaging Results (Last 72 Hours)     Procedure Component Value Units Date/Time    XR Chest PA & Lateral [[id number]] Collected:  01/18/20 1650     Updated:  01/18/20 1654    Narrative:       XR CHEST PA AND LATERAL- 1/18/2020 4:29 PM CST     HISTORY: shortness of breath       COMPARISON: Chest exam dated 07/05/2018.     FINDINGS:   Upright frontal and lateral radiographs of the chest were obtained.     No lung consolidation. No pleural effusion or pneumothorax.  Hyperexpanded lung volumes. Left chest wall AICD. Underlying  cardiomegaly which is stable. Mild central pulmonary congestion without  interstitial or johanna edema. The osseous structures and surrounding soft  tissues demonstrate no acute abnormality.       Impression:       1. Underlying cardiomegaly which is stable. There is a mild central  pulmonary vascular congestion which is also stable. No  "visualized  infiltrate or new pulmonary edema.     This report was finalized on 01/18/2020 16:51 by Dr Owen Causey, .          Objective     No Known Allergies    Medication Review: Performed  Current Facility-Administered Medications   Medication Dose Route Frequency Provider Last Rate Last Dose   • allopurinol (ZYLOPRIM) tablet 100 mg  100 mg Oral BID North Gregg MD   100 mg at 01/18/20 2109   • aspirin EC tablet 81 mg  81 mg Oral Daily North Gregg MD   81 mg at 01/18/20 1710   • atorvastatin (LIPITOR) tablet 40 mg  40 mg Oral Daily North Gregg MD   40 mg at 01/18/20 1710   • carvedilol (COREG) tablet 25 mg  25 mg Oral BID With Meals North Gregg MD   25 mg at 01/18/20 1710   • enoxaparin (LOVENOX) syringe 40 mg  40 mg Subcutaneous Q24H North Gregg MD   40 mg at 01/18/20 1710   • [START ON 1/20/2020] famotidine (PEPCID) tablet 40 mg  40 mg Oral Daily North Gregg MD       • HYDROcodone-acetaminophen (NORCO) 7.5-325 MG per tablet 1 tablet  1 tablet Oral Q6H PRN North Gregg MD   1 tablet at 01/18/20 2322   • pantoprazole (PROTONIX) EC tablet 40 mg  40 mg Oral BID North Gregg MD   40 mg at 01/18/20 2108   • sacubitril-valsartan (ENTRESTO) 24-26 MG tablet 1 tablet  1 tablet Oral Q12H North Gregg MD   1 tablet at 01/18/20 2109   • sodium chloride 0.9 % flush 10 mL  10 mL Intravenous Q12H North Gregg MD   10 mL at 01/18/20 2108   • sodium chloride 0.9 % flush 10 mL  10 mL Intravenous PRN North Gregg MD           Vital Sign Min/Max for last 24 hours  Temp  Min: 97.5 °F (36.4 °C)  Max: 98.3 °F (36.8 °C)   BP  Min: 90/58  Max: 115/56   Pulse  Min: 73  Max: 99   Resp  Min: 18  Max: 20   SpO2  Min: 96 %  Max: 99 %   No data recorded   Weight  Min: 97.8 kg (215 lb 9.6 oz)  Max: 106 kg (233 lb 11 oz)     Flowsheet Rows      First Filed Value   Admission Height  193 cm (76\") Documented at 01/18/2020 1328   Admission Weight  106 kg (233 lb 11 " oz) Documented at 01/18/2020 1110          Results for orders placed during the hospital encounter of 01/18/20   Adult Transthoracic Echo Complete With Contrast if Necessary Per Protocol    Narrative · The left ventricular cavity is mildly dilated.  · Estimated EF = 20%.  · Left ventricular diastolic dysfunction.  · Left ventricular systolic function is severely decreased.  · Left atrial cavity size is moderately dilated.  · Mild mitral valve regurgitation is present  · Moderate tricuspid valve regurgitation is present.  · Severe pulmonary hypertension is present.          Physical Exam:    General Appearance: Awake, alert, in no acute distress  Eyes: Pupils equal and reactive    Ears: Appear intact with no abnormalities noted  Nose: Nares normal, no drainage  Neck: supple, trachea midline, no carotid bruit and no JVD  Back: no kyphosis present,    Lungs: respirations regular, respirations even and respirations unlabored    Heart: normal S1, S2,  2/6 pansystolic murmur in the left sternal border,    no rub and no click    Abdomen: normal bowel sounds, no tenderness   Skin: no bleeding, bruising or rash  Extremities: no cyanosis  Psychiatric/Behavioral: Negative for agitation, behavioral problems, confusion, the patient does  appear to be nervous/anxious.          Results Review:   I reviewed the patient's new clinical results.  I reviewed the patient's new imaging results and agree with the interpretation.  I reviewed the patient's other test results and agree with the interpretation  I personally viewed and interpreted the patient's EKG/Telemetry data  Discussed with patient    Reviewed available prior notes, consults, prior visits, laboratory findings, radiology and cardiology relevant reports.   Updated chart as applicable.   I have reviewed the patient's medical history in detail and updated the computerized patient record as relevant.      Updated patient regarding any new or relevant abnormalities on review of  records or any new findings on physical exam.   Mentioned to patient about purpose of visit and desirable health short and long term goals and objectives.     Assessment/Plan       Acute on chronic systolic heart failure (CMS/HCC)    PVD (peripheral vascular disease) (CMS/[facility])    Coronary artery disease involving native coronary artery of native heart without angina pectoris    S/P implantation of automatic cardioverter/defibrillator (AICD)    HLD (hyperlipidemia)    Ischemic cardiomyopathy    HTN (hypertension)    CKD (chronic kidney disease)    COPD, group B, by GOLD 2017 classification (CMS/[facility])    Severe pulmonary hypertension (CMS/HCC)    Non-sustained ventricular tachycardia (CMS/[facility])        Plan    Dr. Mckeon his primary cardiologist will see tomorrow  Continue aspirin, statin, beta-blocker and Entresto  Add long-acting nitrates and hydralazine  Monitor kidney functions closely  Diuretic therapy as tolerated agree that needs  Upgrade to Bi V ICD given severe left ventricular systolic dysfunction and intraventricular conduction defect.  Moderate to high complexity decision making  Telemetry  Daily weights  Appropriate diet, fluid, sodium, caffeine, stimulants intake   Questions were encouraged, asked and answered to the patient's  understanding and satisfaction.  Compliance to diet and medications       Washington Subramanian MD  01/19/20  9:56 AM    EMR Dragon/Transcription was used to dictate part of this note

## 2020-01-19 NOTE — PROGRESS NOTES
[facility] Medicine Services  INPATIENT PROGRESS NOTE    Length of Stay: 1  Date of Admission: 1/18/2020  Primary Care Physician: Alli Perry MD    Subjective   Chief Complaint: Follow-up shortness of breath  HPI   Patient resting in bed.  He states he is feeling a little better today, but continues to have shortness of breath with exertion.  He walked down the hallway this morning to get himself a cup of coffee and states he was very winded when he got back to his room.  He denies chest pain.  He has an occasional, non-productive cough.  He is eating and drinking well.  He states he normally does not have any swelling in his lower extremities.    Review of Systems   All pertinent negatives and positives are as above. All other systems have been reviewed and are negative unless otherwise stated.     Objective    Temp:  [97.3 °F (36.3 °C)-98.3 °F (36.8 °C)] 97.3 °F (36.3 °C)  Heart Rate:  [73-86] 73  Resp:  [18-20] 18  BP: ()/(52-58) 98/58  Physical Exam   Constitutional: He is oriented to person, place, and time. He appears well-developed and well-nourished. No distress.   HENT:   Head: Normocephalic and atraumatic.   Neck: Normal range of motion. Neck supple. No JVD present. No tracheal deviation present.   Cardiovascular: Normal rate, regular rhythm and intact distal pulses. Exam reveals no gallop and no friction rub.   Sinus 75-96   Pulmonary/Chest: Effort normal. He has no wheezes. He has rales (Fine bibasilar rales).   Abdominal: Soft. Bowel sounds are normal. He exhibits no distension. There is no tenderness. There is no guarding.   Musculoskeletal: He exhibits edema (BLE mild 1+ edema). He exhibits no tenderness.   Neurological: He is alert and oriented to person, place, and time. No cranial nerve deficit.   Skin: Skin is warm and dry. No rash noted. No erythema.   Psychiatric: He has a normal mood and affect. His behavior is normal. Judgment and thought content  normal.   Vitals reviewed.    Results Review:  I have reviewed the labs, radiology results, and diagnostic studies.    Laboratory Data:   Results from last 7 days   Lab Units 01/18/20  1448   WBC 10*3/mm3 10.22   HEMOGLOBIN g/dL 11.1*   HEMATOCRIT % 33.7*   PLATELETS 10*3/mm3 304     Results from last 7 days   Lab Units 01/19/20  1416 01/18/20  1448   SODIUM mmol/L 134* 139   POTASSIUM mmol/L 4.5 4.5   CHLORIDE mmol/L 101 103   CO2 mmol/L 25.0 24.0   BUN mg/dL 60* 59*   CREATININE mg/dL 2.37* 2.31*   CALCIUM mg/dL 8.3* 8.9   BILIRUBIN mg/dL 0.9 1.5*   ALK PHOS U/L 114 125*   ALT (SGPT) U/L 54* 70*   AST (SGOT) U/L 40 53*   GLUCOSE mg/dL 113* 106*     Radiology Data:   Imaging Results (Last 24 Hours)     Procedure Component Value Units Date/Time    XR Chest PA & Lateral [[id number]] Collected:  01/18/20 1650     Updated:  01/18/20 1654    Narrative:       XR CHEST PA AND LATERAL- 1/18/2020 4:29 PM CST     HISTORY: shortness of breath       COMPARISON: Chest exam dated 07/05/2018.     FINDINGS:   Upright frontal and lateral radiographs of the chest were obtained.     No lung consolidation. No pleural effusion or pneumothorax.  Hyperexpanded lung volumes. Left chest wall AICD. Underlying  cardiomegaly which is stable. Mild central pulmonary congestion without  interstitial or johanna edema. The osseous structures and surrounding soft  tissues demonstrate no acute abnormality.       Impression:       1. Underlying cardiomegaly which is stable. There is a mild central  pulmonary vascular congestion which is also stable. No visualized  infiltrate or new pulmonary edema.     This report was finalized on 01/18/2020 16:51 by Dr Owen Causey, .        Results for orders placed during the hospital encounter of 01/18/20   Adult Transthoracic Echo Complete With Contrast if Necessary Per Protocol    Narrative · The left ventricular cavity is mildly dilated.  · Estimated EF = 20%.  · Left ventricular diastolic dysfunction.  · Left  ventricular systolic function is severely decreased.  · Left atrial cavity size is moderately dilated.  · Mild mitral valve regurgitation is present  · Moderate tricuspid valve regurgitation is present.  · Severe pulmonary hypertension is present.        I have reviewed the patient current medications.     Assessment/Plan     Active Hospital Problems    Diagnosis   • **Acute on chronic systolic heart failure (CMS/HCC)   • Severe pulmonary hypertension (CMS/HCC)   • Non-sustained ventricular tachycardia (CMS/HCC)   • COPD, group B, by GOLD 2017 classification (CMS/[facility])   • S/P implantation of automatic cardioverter/defibrillator (AICD)   • PVD (peripheral vascular disease) (CMS/HCC)   • Ischemic cardiomyopathy   • Coronary artery disease involving native coronary artery of native heart without angina pectoris     3.0 x28 mm Xience MELISA to the proximal LAD in 12/2018     • CKD (chronic kidney disease)   • HTN (hypertension)   • HLD (hyperlipidemia)     Plan:  1.  Patient responded well to Lasix yesterday.  Renal function and electrolytes stable.  Will continue Lasix 40 mg IV BID.  Continue daily weights, strict intake and output, and cardiac diet.  2.  Appreciate cardiology assistance.  Transthoracic echocardiogram reviewed as above.  EF now 20%, previously noted to be 31-35% in December 2018.  Now demonstrating severe pulmonary hypertension as well.  Patient needs to upgrade to Biventricular AICD, will need to discuss with Dr. Mckeon tomorrow.  3.  Continue Aspirin, Lipitor, Coreg, and Entresto.  Hydralazine and Imdur added as per Dr. Subramanian today.  Currently holding Brilinta with recent GI bleed.  Lovenox at prophylactic dosing, monitor closely for any signs of bleeding.  4.  Activity as tolerated  5.  Labs in AM    Discharge Planning: I expect the patient to be discharged to home in 1-2 days.    Sandy Coreas, APRN   01/19/20   3:39 PM    I personally evaluated and examined the patient in conjunction with Anusha  OLI Coreas and agree with the assessment, treatment plan, and disposition of the patient as recorded by her. My history, exam, and further recommendations are:     Patient seen and examined at bedside.  May need upgrade of device per cardiology.  Dane.    North Gregg MD  01/19/20  8:02 PM

## 2020-01-19 NOTE — PROGRESS NOTES
"Pharmacy Dosing Service  Automatic Renal Adjustment  Pepcid    Assessment/Action/Plan:  Based on prescribing information provided by the drug , Pepcid 40 mg PO every 12 hours, has been changed to Pepcid 40 mg PO every 24 hours. Pharmacy will continue to monitor daily and make further adjustment(s) accordingly.     Subjective:  Jadon Flynn is a 69 y.o. male     Additional Factors Considered:  Patient disposition per documentation  Disease state or condition being treated    Objective:  Ht: 193 cm (75.98\"); Wt: 97.8 kg (215 lb 9.8 oz)  Estimated Creatinine Clearance: 41.7 mL/min (A) (by C-G formula based on SCr of 2.31 mg/dL (H)).   Lab Results   Component Value Date    CREATININE 2.31 (H) 01/18/2020    CREATININE 2.12 (H) 12/30/2018    CREATININE 2.11 (H) 12/29/2018       Deirdre Guevara, PharmD  01/19/20 7:52 AM    "

## 2020-01-20 NOTE — PLAN OF CARE
Problem: Patient Care Overview  Goal: Plan of Care Review  Outcome: Ongoing (interventions implemented as appropriate)  Flowsheets (Taken 1/20/2020 6674)  Progress: improving  Plan of Care Reviewed With: patient  Outcome Summary: No c/o pain this shift. Ambulating in hallway frequently, no c/o dizziness or SOA. VSS. Good urine output with IV lasix. Ace wrap to BLE. Possible d/c home tomorrow. Continue to monitor.

## 2020-01-20 NOTE — PROGRESS NOTES
Discharge Planning Assessment   [place]     Patient Name: Jadon Flynn  MRN: [id number]  Today's Date: 1/20/2020    Admit Date: 1/18/2020    Discharge Needs Assessment     Row Name 01/20/20 1006       Living Environment    Lives With  spouse    Name(s) of Who Lives With Patient  Teresa    Current Living Arrangements  home/apartment/condo    Primary Care Provided by  self    Provides Primary Care For  no one    Family Caregiver if Needed  spouse    Quality of Family Relationships  helpful;involved;supportive    Able to Return to Prior Arrangements  yes       Resource/Environmental Concerns    Resource/Environmental Concerns  none       Transition Planning    Patient/Family Anticipates Transition to  home with family    Patient/Family Anticipated Services at Transition  none    Transportation Anticipated  family or friend will provide       Discharge Needs Assessment    Readmission Within the Last 30 Days  no previous admission in last 30 days    Concerns to be Addressed  no discharge needs identified    Equipment Currently Used at Home  oxygen Pt has 02 from [employer] and wears PRN.  Pt also has a scale for home usage.    Anticipated Changes Related to Illness  none    Equipment Needed After Discharge  none    Current Discharge Risk  chronically ill    Discharge Coordination/Progress  Pt has PCP and RX coverage.  Pt can afford medications.  Pt denies any dc needs and does not want hh or [facility].        Discharge Plan    No documentation.       Destination      Coordination has not been started for this encounter.      Durable Medical Equipment      Coordination has not been started for this encounter.      Dialysis/Infusion      Coordination has not been started for this encounter.      Home Medical Care      Coordination has not been started for this encounter.      Therapy      Coordination has not been started for this encounter.      Community Resources      Coordination has not been started for this  encounter.          Demographic Summary    No documentation.       Functional Status    No documentation.       Psychosocial    No documentation.       Abuse/Neglect    No documentation.       Legal    No documentation.       Substance Abuse    No documentation.       Patient Forms    No documentation.           MEAGAN EspinosaW

## 2020-01-20 NOTE — PAYOR COMM NOTE
"1/20/20 [facility] [phone]  [phone]    PATIENT ADMITTED ON 1/18/20 ER ADMISSION. INPATIENT STATUS.      [id number]        Lucero Flynn (69 y.o. Male)     Date of Birth Social Security Number Address [place] Phone MRN    1950  [facility] [place] [address] [phone] [id number]    Advent Marital Status          Cheondoism        Admission Date Admission Type Admitting Provider Attending Provider Department, Room/Bed    1/18/20 Urgent Black Chua DO Robinson, Maurice S, DO [facility] 4B, 410/1    Discharge Date Discharge Disposition Discharge Destination                       Attending Provider:  Black Chua DO    Allergies:  No Known Allergies    Isolation:  None   Infection:  None   Code Status:  CPR    Ht:  193 cm (75.98\")   Wt:  100 kg (220 lb 9.6 oz)    Admission Cmt:  None   Principal Problem:  Acute on chronic systolic heart failure (CMS/HCC) [I50.23]                 Active Insurance as of 1/18/2020     Primary Coverage     Payor Plan Insurance Group Employer/Plan Group    AETNA MEDICARE REPLACEMENT AETNA MEDICARE REPLACEMENT [id number]     Payor Plan Address Payor Plan Phone Number Payor Plan Fax Number Effective Dates    [address] [phone]  1/1/2020 - None Entered    [facility] [id number]       Subscriber Name Subscriber Birth Date Member ID       LUCERO FLYNN [id number]                 Emergency Contacts      (Rel.) Home Phone Work Phone Mobile Phone    Kori Flynn (Daughter) [phone] -- --    LIONSYD ESCOBEDO (Spouse) [phone] -- --        North Gregg MD   Physician   Medicine   H&P   Signed   Date of Service:  01/18/20 1508   Creation Time:  01/18/20 1508            Signed        Expand All Collapse All      Show:Clear all  [x]Manual[x]Template[]Copied    Added by:  [x]North Gregg MD    []Hover for details       [facility] " [facility] Medicine Services  HISTORY AND PHYSICAL     Date of Admission: 1/18/2020  Primary Care Physician: Alli Perry MD     Subjective      Chief Complaint: shortness of breath     History of Present Illness     Patient is a 69-year-old male with a past medical history of COPD, systolic heart failure, chronic kidney disease, and a recent hospitalization for GI bleed.  Patient reports that he got up the hospital on Thursday after a GI bleed.  Patient reportedly received IV fluids as well as 2 units of packed red blood cells.  Patient was subsequently discharged on a Thursday, and then on Friday he noted that his lower extremities were significantly swollen and he was having worsening shortness of breath.  Last night he was unable to lie flat.  He complained of orthopnea and paroxysmal nocturnal dyspnea.  He even tried to sleep on the recliner on the couch without any improvement.  His bed sets up and he was not able to set up his bed in order to sleep.  Patient indicated that he got a dose of Lasix at the emergency department, is already feeling somewhat better.  Patient denies any nausea vomiting or diarrhea.  Patient denies any fever or chills.  Patient denies any recent sick contacts.   Normally patient wears 2 L nasal cannula at bedtime.  He has had to increase it up to 3-1/2 without any significant improvement of his shortness of breath.      Review and summary of the outside hospital record shows, the patient arrived with a temperature of 97.4, BP of 113/78, heart rate of 91, respiratory rate of 18, and O2 sat of 97%.  Patient was given 60 mg of IV Lasix at approximately 10:30 AM.  Patient had a lactic acid of 0.8, ABG showed pH of 7.4, PCO2 of 32, PO2 of 74.  Patient's white count was 9.9, hemoglobin of 11.4, platelet count of 312.  CMP showed sodium of 137, potassium 4.5, creatinine of 2.51 (discharged with a creatinine 1.9 on Thursday).  Patient was found to have normal LFTs and normal  bilirubin.  Patient had a proBNP of 15,137, and a troponin that was within normal limits.  His EKG showed a 4 beat run of VT.  Patient has a defibrillator in place.  Chest x-ray was unremarkable, showed no indication of heart failure per the official read, although the ER physician indicated that it did show heart failure.     Also requested the records from his recent hospitalization.  Patient reported arrival in 1/12/2020 with abdominal pain and black stools.  Patient was on dual antiplatelet therapy at that time.  Abdominal pain was epigastric.  Patient about 6 months ago had a GI bleed, and required 6 units of PRBCs.  He had a repeat scope in October 2019 that was reportedly normal.  At that time he had arrived with a hemoglobin of 8.7.  Patient received 2 packs of red blood cells.  Patient also received several units of packed red blood cells.  Patient reportedly underwent upper endoscopy and was found to have a gastric ulcer and hiatal hernia.  They took the patient off of both aspirin and Brilinta at that time, with plans to put the patient back on aspirin soon            Patient has had 3 large urination into the urinal since lasix.       Review of Systems   Constitutional: Positive for activity change and fatigue. Negative for appetite change, chills, diaphoresis and fever.   Respiratory: Positive for shortness of breath.    Cardiovascular: Positive for leg swelling. Negative for chest pain and palpitations.   Gastrointestinal: Negative for abdominal distention, abdominal pain, constipation, diarrhea, nausea and vomiting.   All other systems reviewed and are negative.        Otherwise complete ROS reviewed and negative except as mentioned in the HPI.Medical History:   Medical History        Past Medical History:   Diagnosis Date   • Asymptomatic bilateral carotid artery stenosis     • CAD (coronary artery disease) 12/07/2016   • CAD in native artery        2009 stents   • Carotid artery disease (CMS/[facility])     "  • CHF (congestive heart failure) (CMS/HCC) 12/07/2016   • Chronic combined systolic and diastolic CHF (congestive heart failure) (CMS/HCC)       echo 5/2013 EF 35-40%   • Chronic systolic CHF (congestive heart failure), NYHA class 2 (CMS/HCC)     • CKD (chronic kidney disease) 12/07/2016   • CKD (chronic kidney disease), stage III (CMS/HCC)     • COPD, group B, by GOLD 2017 classification (CMS/HCC) 11/20/2018   • Essential hypertension     • Gout     • HLD (hyperlipidemia) 12/07/2016   • HTN (hypertension) 12/07/2016   • Hyperkalemia     • Hyperlipidemia, unspecified     • Ischemic heart disease 12/07/2016   • Ischemic heart disease     • NSTEMI (non-ST elevated myocardial infarction) (CMS/HCC) 12/28/2018   • Peripheral vascular disease (CMS/HCC)     • Pinched nerve in neck     • PVD (peripheral vascular disease) (CMS/HCC) 12/07/2016   • S/P implantation of automatic cardioverter/defibrillator (AICD) 12/07/2016   • S/P implantation of automatic cardioverter/defibrillator (AICD)     • Stroke (CMS/[facility])           Vital Signs: /56 (BP Location: Left arm, Patient Position: Sitting)   Pulse 99   Temp 97.7 °F (36.5 °C) (Oral)   Resp 20   Ht 193 cm (76\")   Wt 97.8 kg (215 lb 9.6 oz)   SpO2 99%   BMI 26.24 kg/m²   Physical Exam   Constitutional: He is oriented to person, place, and time. No distress.   HENT:   Head: Normocephalic and atraumatic.   Eyes: Conjunctivae are normal. No scleral icterus.   Neck: No JVD present.   Cardiovascular: Normal rate, regular rhythm and intact distal pulses.   Murmur heard.  Pulmonary/Chest: Effort normal. He has rales.   Abdominal: Soft. Bowel sounds are normal. He exhibits no distension and no mass. There is no tenderness. There is no guarding.   Musculoskeletal: He exhibits edema.   Neurological: He is alert and oriented to person, place, and time.   Skin: Skin is warm and dry. Capillary refill takes 2 to 3 seconds. No rash noted. He is not diaphoretic. No erythema. There " is pallor.   Psychiatric: He has a normal mood and affect. His behavior is normal.   Nursing note and vitals reviewed.       Procedure Component Value Units Date/Time     Protime-INR [[id number]]  (Abnormal) Collected:  01/18/20 1448     Specimen:  Blood Updated:  01/18/20 1505       Protime 15.4 Seconds         INR 1.18     CBC & Differential [[id number]] Collected:  01/18/20 1448     Specimen:  Blood Updated:  01/18/20 1458     Narrative:        The following orders were created for panel order CBC & Differential.  Procedure                               Abnormality         Status                     ---------                               -----------         ------                     CBC Auto Differential[[id number]]        Abnormal            Final result                  Please view results for these tests on the individual orders.     CBC Auto Differential [[id number]]  (Abnormal) Collected:  01/18/20 1448     Specimen:  Blood Updated:  01/18/20 1458       WBC 10.22 10*3/mm3         RBC 3.58 10*6/mm3         Hemoglobin 11.1 g/dL         Hematocrit 33.7 %         MCV 94.1 fL         MCH 31.0 pg         MCHC 32.9 g/dL         RDW 20.6 %         RDW-SD 70.4 fl         MPV 14.0 fL         Platelets 304 10*3/mm3         Neutrophil % 71.1 %         Lymphocyte % 14.2 %         Monocyte % 9.4 %         Eosinophil % 4.0 %         Basophil % 0.7 %         Neutrophils, Absolute 7.27 10*3/mm3         Lymphocytes, Absolute 1.45 10*3/mm3         Monocytes, Absolute 0.96 10*3/mm3         Eosinophils, Absolute 0.41 10*3/mm3         Basophils, Absolute 0.07 10*3/mm3       Hemoglobin A1c [[id number]]      Assessment:        Active Hospital Problems     Diagnosis   • **Acute on chronic systolic heart failure (CMS/HCC)   • Severe pulmonary hypertension (CMS/HCC)   • Non-sustained ventricular tachycardia (CMS/HCC)   • COPD, group B, by GOLD 2017 classification (CMS/HCC)   • S/P implantation of automatic cardioverter/defibrillator  (AICD)   • PVD (peripheral vascular disease) (CMS/HCC)   • Ischemic cardiomyopathy   • Coronary artery disease involving native coronary artery of native heart without angina pectoris       3.0 x28 mm Xience MELISA to the proximal LAD in 12/2018      • CKD (chronic kidney disease)   • HTN (hypertension)   • HLD (hyperlipidemia)            Plan:   1.  Admit to telemetry   2.  CXR 2-view  3.  Echo   4.  IV furosemide 40 mg   5.  Cardiology consultation   6.  CBC, CMP, BNP, TSH, A1c, INR, Troponin    7.  Strict I/O, daily weights  8.  Hold brilenta with recent GI bleed - Continue ASA  9.  Resume cardiac medications as appropriate   10.  Request ICD interrogation   11.  Lovenox DVT PPx  12.  Cardiac Diet   13.  ECG at OSH had NSVT - Evaluate ICD and will consider EP vs. Amiodarone   14.  O2 PRN         Code Status: Full     I discussed the patient's findings and my recommendations with the patient and bedside RN      Estimated length of stay 2-4      North Gregg MD   01/18/20   3:08 PM        Washington Subramanian MD   Physician   Cardiology   Consults   Signed   Date of Service:  01/19/20 0956   Creation Time:  01/19/20 0956         Consult Orders   Inpatient Cardiology Consult [[id number]] ordered by North Gregg MD at 01/18/20 1659          Signed        Expand All Collapse All      Show:Clear all  [x]Manual[x]Template[x]Copied    Added by:  [x]Washington Subramanian MD    [][facility] for details      LOS: 1 day   Patient Care Team:  Alli Prery MD as PCP - General  Alli Perry MD as PCP - Family Medicine  John Mckeon MD as Cardiologist (Cardiology)  Katie Parra APRN as Nurse Practitioner (Pulmonary Disease)  John Cantu MD as Cardiologist (Cardiac Electrophysiology)     Chief Complaint:   Acute on chronic systolic heart failure (CMS/HCC)    PVD (peripheral vascular disease) (CMS/HCC)    Coronary artery disease involving native coronary artery of native heart without angina pectoris    S/P  implantation of automatic cardioverter/defibrillator (AICD)    HLD (hyperlipidemia)    Ischemic cardiomyopathy    HTN (hypertension)    CKD (chronic kidney disease)    COPD, group B, by GOLD 2017 classification (CMS/HCC)    Severe pulmonary hypertension (CMS/HCC)    Non-sustained ventricular tachycardia (CMS/HCC)Subjective     Jadon Flynn is a 69 y.o. malewho is being seen in consultation.  Patient presents with shortness of breath  He has not been feeling well  Was admitted to hospital in [place] around [place].  He was discharged home now presented with increasing shortness of breath weakness fatigue orthopnea and bipedal edema  Since admission he has improved substantially  Results of echocardiogram as below  He sees Dr. Mckeon  He has chronically depressed left ventricular ejection fraction with interventricular conduction defect on EKG  Latest EKG shows sinus rhythm with IVCD and premature ventricular contractions  His leg swelling has improved markedly  No palpitation  No presyncope syncope  Denies any chest pain  Oral intake is fair  Satisfactory bowel and bladder activity  Ambulating more  Hemodynamically stable  Chronic kidney disease  Troponin elevated with a flat trajectory not consistent with acute coronary syndrome as below     Component      Latest Ref Rng & Units 1/18/2020 1/18/2020 1/19/2020           2:48 PM  8:17 PM     Troponin T      0.000 - 0.030 ng/mL 0.028 0.030 0.038 (HH)      Telemetry: no malignant arrhythmia. No significant pauses.     Review of Systems   Results   Component Value Date     TROPONINI 0.475 (H) 12/29/2018     TROPONINT 0.038 (C) 01/19/2020      PT/INR:          Protime   Date Value Ref Range Status   01/18/2020 15.4 (H) 11.9 - 14.6 Seconds Final   /        INR   Date Value Ref Range Status   01/18/2020 1.18 (H) 0.91 - 1.09 Final                  Imaging Results (Last 72 Hours)      Procedure Component Value Units Date/Time     XR Chest PA & Lateral [[id number]]  Collected:  01/18/20 1650       Updated:  01/18/20 1654     Narrative:        XR CHEST PA AND LATERAL- 1/18/2020 4:29 PM CST     HISTORY: shortness of breath       COMPARISON: Chest exam dated 07/05/2018.     FINDINGS:   Upright frontal and lateral radiographs of the chest were obtained.     No lung consolidation. No pleural effusion or pneumothorax.  Hyperexpanded lung volumes. Left chest wall AICD. Underlying  cardiomegaly which is stable. Mild central pulmonary congestion without  interstitial or johanna edema. The osseous structures and surrounding soft  tissues demonstrate no acute abnormality.        Impression:        1. Underlying cardiomegaly which is stable. There is a mild central  pulmonary vascular congestion which is also stable. No visualized  infiltrate or new pulmonary edema.     This report was finalized on 01/18/2020 16:51 by Dr Owen Causey, .                Objective         No Known Allergies     Medication Review: Performed  Current Medications             Current Facility-Administered Medications   Medication Dose Route Frequency Provider Last Rate Last Dose   • allopurinol (ZYLOPRIM) tablet 100 mg  100 mg Oral BID North Gregg MD   100 mg at 01/18/20 2109   • aspirin EC tablet 81 mg  81 mg Oral Daily North Gregg MD   81 mg at 01/18/20 1710   • atorvastatin (LIPITOR) tablet 40 mg  40 mg Oral Daily North Gregg MD   40 mg at 01/18/20 1710   • carvedilol (COREG) tablet 25 mg  25 mg Oral BID With Meals North Gregg MD   25 mg at 01/18/20 1710   • enoxaparin (LOVENOX) syringe 40 mg  40 mg Subcutaneous Q24H North Gregg MD   40 mg at 01/18/20 1710   • [START ON 1/20/2020] famotidine (PEPCID) tablet 40 mg  40 mg Oral Daily North Gregg MD       • HYDROcodone-acetaminophen (NORCO) 7.5-325 MG per tablet 1 tablet  1 tablet Oral Q6H PRN North Gregg MD   1 tablet at 01/18/20 2322   • pantoprazole (PROTONIX) EC tablet 40 mg  40 mg Oral BID North Gregg  "MD Ethan   40 mg at 01/18/20 2108   • sacubitril-valsartan (ENTRESTO) 24-26 MG tablet 1 tablet  1 tablet Oral Q12H North Gregg MD   1 tablet at 01/18/20 2109   • sodium chloride 0.9 % flush 10 mL  10 mL Intravenous Q12H North Gregg MD   10 mL at 01/18/20 2108   • sodium chloride 0.9 % flush 10 mL  10 mL Intravenous PRN North Gregg MD                Vital Sign Min/Max for last 24 hours  Temp  Min: 97.5 °F (36.4 °C)  Max: 98.3 °F (36.8 °C)   BP  Min: 90/58  Max: 115/56   Pulse  Min: 73  Max: 99   Resp  Min: 18  Max: 20   SpO2  Min: 96 %  Max: 99 %   No data recorded   Weight  Min: 97.8 kg (215 lb 9.6 oz)  Max: 106 kg (233 lb 11 oz)          Flowsheet Rows      First Filed Value   Admission Height  193 cm (76\") Documented at 01/18/2020 1328   Admission Weight  106 kg (233 lb 11 oz) Documented at 01/18/2020 1110                  Results for orders placed during the hospital encounter of 01/18/20   Adult Transthoracic Echo Complete With Contrast if Necessary Per Protocol     Narrative · The left ventricular cavity is mildly dilated.  · Estimated EF = 20%.  · Left ventricular diastolic dysfunction.  · Left ventricular systolic function is severely decreased.  · Left atrial cavity size is moderately dilated.  · Mild mitral valve regurgitation is present  · Moderate tricuspid valve regurgitation is present.  · Severe pulmonary hypertension is present.            Physical Exam:     General Appearance: Awake, alert, in no acute distress  Eyes: Pupils equal and reactive    Ears: Appear intact with no abnormalities noted  Nose: Nares normal, no drainage  Neck: supple, trachea midline, no carotid bruit and no JVD  Back: no kyphosis present,    Lungs: respirations regular, respirations even and respirations unlabored     Heart: normal S1, S2,  2/6 pansystolic murmur in the left sternal border,    no rub and no click     Abdomen: normal bowel sounds, no tenderness   Skin: no bleeding, bruising or " rash  Extremities: no cyanosis  Psychiatric/Behavioral: Negative for agitation, behavioral problems, confusion, the patient does  appear to be nervous/anxious.                      Results Review:   I reviewed the patient's new clinical results.  I reviewed the patient's new imaging results and agree with the interpretation.  I reviewed the patient's other test results and agree with the interpretation  I personally viewed and interpreted the patient's EKG/Telemetry data  Discussed with patient     Reviewed available prior notes, consults, prior visits, laboratory findings, radiology and cardiology relevant reports.   Updated chart as applicable.   I have reviewed the patient's medical history in detail and updated the computerized patient record as relevant.       Updated patient regarding any new or relevant abnormalities on review of records or any new findings on physical exam.   Mentioned to patient about purpose of visit and desirable health short and long term goals and objectives.         Assessment/Plan           Acute on chronic systolic heart failure (CMS/HCC)    PVD (peripheral vascular disease) (CMS/[facility])    Coronary artery disease involving native coronary artery of native heart without angina pectoris    S/P implantation of automatic cardioverter/defibrillator (AICD)    HLD (hyperlipidemia)    Ischemic cardiomyopathy    HTN (hypertension)    CKD (chronic kidney disease)    COPD, group B, by GOLD 2017 classification (CMS/[facility])    Severe pulmonary hypertension (CMS/HCC)    Non-sustained ventricular     Non-sustained ventricular tachycardia (CMS/[facility])           Plan     Dr. Mckeon his primary cardiologist will see tomorrow  Continue aspirin, statin, beta-blocker and Entresto  Add long-acting nitrates and hydralazine  Monitor kidney functions closely  Diuretic therapy as tolerated agree that needs  Upgrade to Bi V ICD given severe left ventricular systolic dysfunction and intraventricular conduction  defect.  Moderate to high complexity decision making  Telemetry  Daily weights  Appropriate diet, fluid, sodium, caffeine, stimulants intake   Questions were encouraged, asked and answered to the patient's  understanding and satisfaction.  Compliance to diet and medications         Washington Subramanian MD  01/19/20  9:56 AM     EMR Dragon/Transcription was used to dictate part of this note      North Gregg MD   Physician   Medicine   Progress Notes   Signed   Date of Service:  01/19/20 1539   Creation Time:  01/19/20 1539            Signed             Show:Clear all  [x]Manual[x]Template[]Copied    Added by:  [x]North Gregg MD[x]Sandy Coreas APRN    []Ayad for details       [facility] Medicine Services  INPATIENT PROGRESS NOTE     Length of Stay: 1  Date of Admission: 1/18/2020  Primary Care Physician: Alli Perry MD     Subjective   Chief Complaint: Follow-up shortness of breath  HPI   Patient resting in bed.  He states he is feeling a little better today, but continues to have shortness of breath with exertion.  He walked down the hallway this morning to get himself a cup of coffee and states he was very winded when he got back to his room.  He denies chest pain.  He has an occasional, non-productive cough.  He is eating and drinking well.  He states he normally does not have any swelling in his lower extremities.     Review of Systems   All pertinent negatives and positives are as above. All other systems have been reviewed and are negative unless otherwise stated.      Objective    Temp:  [97.3 °F (36.3 °C)-98.3 °F (36.8 °C)] 97.3 °F (36.3 °C)  Heart Rate:  [73-86] 73  Resp:  [18-20] 18  BP: ()/(52-58) 98/58  Physical Exam   Constitutional: He is oriented to person, place, and time. He appears well-developed and well-nourished. No distress.   HENT:   Head: Normocephalic and atraumatic.   Neck: Normal range of motion. Neck supple. No JVD present. No  tracheal deviation present.   Cardiovascular: Normal rate, regular rhythm and intact distal pulses. Exam reveals no gallop and no friction rub.   Sinus 75-96   Pulmonary/Chest: Effort normal. He has no wheezes. He has rales (Fine bibasilar rales).   Abdominal: Soft. Bowel sounds are normal. He exhibits no distension. There is no tenderness. There is no guarding.   Musculoskeletal: He exhibits edema (BLE mild 1+ edema). He exhibits no tenderness.   Neurological: He is alert and oriented to person, place, and time. No cranial nerve deficit.   Skin: Skin is warm and dry. No rash noted. No erythema.   Psychiatric: He has a normal mood and affect. His behavior is normal. Judgment and thought content normal.   Vitals reviewed.     Results Review:  I have reviewed the labs, radiology results, and diagnostic studies.     Laboratory Data:        Results from last 7 days   Lab                   Results from last 7 days   Lab Units 01/19/20  1416 01/18/20  1448   SODIUM mmol/L 134* 139   POTASSIUM mmol/L 4.5 4.5   CHLORIDE mmol/L 101 103   CO2 mmol/L 25.0 24.0   BUN mg/dL 60* 59*   CREATININE mg/dL 2.37* 2.31*   CALCIUM mg/dL 8.3* 8.9   BILIRUBIN mg/dL 0.9 1.5*   ALK PHOS U/L 114 125*   ALT (SGPT) U/L 54* 70*   AST (SGOT) U/L 40 53*   GLUCOSE mg/dL 113* 106*      Active Hospital Problems     Diagnosis   • **Acute on chronic systolic heart failure (CMS/HCC)   • Severe pulmonary hypertension (CMS/HCC)   • Non-sustained ventricular tachycardia (CMS/HCC)   • COPD, group B, by GOLD 2017 classification (CMS/[facility])   • S/P implantation of automatic cardioverter/defibrillator (AICD)   • PVD (peripheral vascular disease) (CMS/HCC)   • Ischemic cardiomyopathy   • Coronary artery disease involving native coronary artery of native heart without angina pectoris       3.0 x28 mm Xience MELISA to the proximal LAD in 12/2018      • CKD (chronic kidney disease)   • HTN (hypertension)   • HLD (hyperlipidemia)      Plan:  1.  Patient responded well  to Lasix yesterday.  Renal function and electrolytes stable.  Will continue Lasix 40 mg IV BID.  Continue daily weights, strict intake and output, and cardiac diet.  2.  Appreciate cardiology assistance.  Transthoracic echocardiogram reviewed as above.  EF now 20%, previously noted to be 31-35% in December 2018.  Now demonstrating severe pulmonary hypertension as well.  Patient needs to upgrade to Biventricular AICD, will need to discuss with Dr. Mckeon tomorrow.  3.  Continue Aspirin, Lipitor, Coreg, and Entresto.  Hydralazine and Imdur added as per Dr. Subramanian today.  Currently holding Brilinta with recent GI bleed.  Lovenox at prophylactic dosing, monitor closely for any signs of bleeding.  4.  Activity as tolerated  5.  Labs in AM     Discharge Planning: I expect the patient to be discharged to home in 1-2 days.     LOI Small   01/19/20   3:39 PM     I personally evaluated and examined the patient in conjunction with OLI Barrera and agree with the assessment, treatment plan, and disposition of the patient as recorded by her. My history, exam, and further recommendations are:      Patient seen and examined at bedside.  May need upgrade of device per cardiology.  Dane.     North Gregg MD  01/19/20  8:02 PM                                 Comprehensive Metabolic Panel [LAB17] (Order [id number])   Order   Date: 1/20/2020 Department: [address] Released By/Authorizing: Sandy Coreas APRN (auto-released)   Reprint Order Requisition     Comprehensive Metabolic Panel (Order #[id number]) on 1/20/20       Contains abnormal data Comprehensive Metabolic Panel   Order: [id number]   Status:  Final result   Visible to patient:  No (Not Released) Next appt:  02/05/2020 at 09:00 AM in Pulmonology ([facility])   Component  Ref Range & Units 04:13  (1/20/20) 1d ago  (1/19/20) 2d ago  (1/18/20) 1yr ago  (12/30/18) 1yr ago  (12/29/18) 1yr ago  (10/10/18) 1yr ago  (7/5/18)    Glucose  65 - 99 mg/dL 98  113High   106High   94 R 155High  R 115High  R 133High  R   BUN  8 - 23 mg/dL 63High   60High   59High   41High  R 42High  R 43High  R 63High  R   Creatinine  0.76 - 1.27 mg/dL 2.29High   2.37High   2.31High   2.12High  R 2.11High  R 2.30High  R 2.01High  R   Sodium  136 - 145 mmol/L 136  134Low   139  139 R 137 R 142 R 141 R   Potassium  3.5 - 5.2 mmol/L 4.3  4.5  4.5  4.6 R 4.8 R 4.8 R 4.4 R   Chloride  98 - 107 mmol/L 102  101  103  105 R 101 R 103 R 102 R   CO2  22.0 - 29.0 mmol/L 25.0  25.0  24.0  23.0Low  R 23.0Low  R 27.0 R 25.0 R   Calcium  8.6 - 10.5 mg/dL 8.5Low   8.3Low   8.9  8.9 R 8.7 R 9.5 R 9.5 R   Total Protein  6.0 - 8.5 g/dL 5.7Low   5.8Low   6.5    7.5 R    Albumin  3.50 - 5.20 g/dL 3.50  3.70  4.10    4.70 R    ALT (SGPT)  1 - 41 U/L 50High   54High   70High     34 R    AST (SGOT)  1 - 40 U/L 52High   40  53High     58High  R    Alkaline Phosphatase  39 - 117 U/L 112  114  125High     53 R    Total Bilirubin  0.2 - 1.2 mg/dL 1.0  0.9  1.5High     0.7 R    eGFR Non African Amer  >60 mL/min/1.73 28Low   27Low   28Low   31Low   31Low   29Low   33Low     Globulin  gm/dL 2.2  2.1  2.4    2.8     A/G Ratio  g/dL 1.6  1.8  1.7    1.7 R    BUN/Creatinine Ratio  7.0 - 25.0 27.5High   25.3High   25.5High   19.3  19.9  18.7  31.3High     Anion Gap  5.0 - 15.0 mmol/L 9.0  8.0  12.0  11.0 R 13.0 R 12.0 R 14.0High  R   Resulting Agency [facility]  [facility]  [facility]  [facility]  [facility]  [facility]      Narrative   Performed by:  [facility]   GFR Normal >60  Chronic Kidney Disease <60  Kidney Failure <15      Specimen Collected: 01/20/20 04:13 Last Resulted: 01/20/20 05:07 Lab Flowsheet Order Details View Encounter Lab and Collection Details Routing Result History      R=Reference range differs from displayed range        Related Result Highlights         CBC (No Diff)  Final result 1/20/2020                  Routing History     Priority Sent On From To Message Type     1/18/2020  4:48 PM [place] Subramanian MD Fleming, John Eric, MD Results   Result Read / Acknowledged      Acknowledge result   No acknowledgement history exists for this order.   Related Result Highlights         CBC (No Diff)  Final result 1/20/2020            Other Results from 1/18/2020      Comprehensive Metabolic Panel  Final result 1/19/2020    Lavender Top  Final result 1/19/2020    Troponin  Final result 1/19/2020    Troponin  Final result 1/18/2020    Troponin  Final result 1/18/2020    Comprehensive Metabolic Panel  Final result 1/18/2020    BNP  Final result 1/18/2020    Protime-INR  Final result 1/18/2020    TSH  Final result 1/18/2020    Hemoglobin A1c  Final result 1/18/2020    CBC Auto Differential  Final result 1/18/2020   (important suggestion)  Warning: Additional results from 1/18/2020 are available but are not displayed in this report.           )   Order: [id number]   Status:  Final result   Visible to patient:  No (Not Released)   Next appt:  02/05/2020 at 09:00 AM in Pulmonology ([facility])   Component  Ref Range & Units 04:13   WBC  3.40 - 10.80 10*3/mm3 8.52    RBC  4.14 - 5.80 10*6/mm3 2.97Low     Hemoglobin  13.0 - 17.7 g/dL 9.3Low     Hematocrit  37.5 - 51.0 % 27.6Low     MCV  79.0 - 97.0 fL 92.9    MCH  26.6 - 33.0 pg 31.3    MCHC  31.5 - 35.7 g/dL 33.7    RDW  12.3 - 15.4 % 20.3High     RDW-SD  37.0 - 54.0 fl 68.5High     MPV  6.0 - 12.0 fL 14.1High     Platelets  140 - 450 10*3/mm3 246    Resulting Agency  [facility]         Specimen Collected: 01/20/20 04:13 Last Resulted: 01/20/20 04:51 Lab Flowsheet Order Details View Encounter Lab and Collection Details Routing Result History         Related Result Highlights         Comprehensive Metabolic Panel  Final result 1/20/2020                  Routing History     Priority Sent On From To Message Type    1/18/2020  4:48 PM [place] Subramanian MD Fleming, John Eric, MD Results   Result R                                                       Current Facility-Administered Medications   Medication Dose Route Frequency Provider Last Rate Last Dose   • allopurinol (ZYLOPRIM) tablet 100 mg  100 mg Oral BID North Gregg MD   100 mg at 01/19/20 2126   • aspirin EC tablet 81 mg  81 mg Oral Daily North Gregg MD   81 mg at 01/20/20 0827   • atorvastatin (LIPITOR) tablet 40 mg  40 mg Oral Daily North Gregg MD   40 mg at 01/20/20 0827   • carvedilol (COREG) tablet 25 mg  25 mg Oral BID With Meals North Gregg MD   25 mg at 01/19/20 1751   • enoxaparin (LOVENOX) syringe 40 mg  40 mg Subcutaneous Q24H North Gregg MD   40 mg at 01/19/20 1511   • famotidine (PEPCID) tablet 40 mg  40 mg Oral Daily North Gregg MD   40 mg at 01/20/20 0827   • furosemide (LASIX) injection 40 mg  40 mg Intravenous BID Sandy Coreas APRN   40 mg at 01/20/20 0827   • hydrALAZINE (APRESOLINE) tablet 10 mg  10 mg Oral Q8H [place] Subramanian MD   10 mg at 01/20/20 0519   • HYDROcodone-acetaminophen (NORCO) 7.5-325 MG per tablet 1 tablet  1 tablet Oral Q6H PRN North Gregg MD   1 tablet at 01/20/20 0135   • isosorbide mononitrate (IMDUR) 24 hr tablet 30 mg  30 mg Oral Q24H Black Chua DO       • pantoprazole (PROTONIX) EC tablet 40 mg  40 mg Oral BID North Gregg MD   40 mg at 01/20/20 0827   • sacubitril-valsartan (ENTRESTO) 24-26 MG tablet 1 tablet  1 tablet Oral Q12H North Gregg MD   1 tablet at 01/19/20 2126   • sodium chloride 0.9 % flush 10 mL  10 mL Intravenous Q12H North Gregg MD   10 mL at 01/20/20 0828   • sodium chloride 0.9 % flush 10 mL  10 mL Intravenous PRN North Gregg MD

## 2020-01-20 NOTE — PROGRESS NOTES
[facility] Medicine Services  INPATIENT PROGRESS NOTE    Length of Stay: 2  Date of Admission: 1/18/2020  Primary Care Physician: Alli Perry MD    Subjective   Chief Complaint: Follow-up  HPI   Sitting up on side of bed with brother at bedside.  He reports feeling much improved today.  He was able to lie flat throughout the night without getting short of breath.  He has been ambulating in the halls today.  He denies chest pain or palpitations.  He has had some dizziness at times.  He denies abdominal pain, nausea, or vomiting.  He is eating and drinking well.    Review of Systems   All pertinent negatives and positives are as above. All other systems have been reviewed and are negative unless otherwise stated.     Objective    Temp:  [97 °F (36.1 °C)-98.3 °F (36.8 °C)] 98.3 °F (36.8 °C)  Heart Rate:  [] 81  Resp:  [18-20] 18  BP: ()/(52-63) 94/63  Physical Exam  Constitutional: He is oriented to person, place, and time. He appears well-developed and well-nourished. No distress.   HENT:   Head: Normocephalic and atraumatic.   Neck: Normal range of motion. Neck supple. No JVD present. No tracheal deviation present.   Cardiovascular: Normal rate, regular rhythm and intact distal pulses. Exam reveals no gallop and no friction rub.   Sinus 74-78 with 14 beats of V. tach and frequent PVCs overnight   Pulmonary/Chest: Effort normal. He has no wheezes. He has no rales.   Abdominal: Soft. Bowel sounds are normal. He exhibits no distension. There is no tenderness. There is no guarding.   Musculoskeletal: He exhibits edema (BLE mild 1+ edema- now wrapped with ACE wrap). He exhibits no tenderness.   Neurological: He is alert and oriented to person, place, and time. No cranial nerve deficit.   Skin: Skin is warm and dry. No rash noted. No erythema.   Psychiatric: He has a normal mood and affect. His behavior is normal. Judgment and thought content normal.   Vitals  reviewed.    Results Review:  I have reviewed the labs, radiology results, and diagnostic studies.    Laboratory Data:   Results from last 7 days   Lab Units 01/20/20  0413 01/18/20  1448   WBC 10*3/mm3 8.52 10.22   HEMOGLOBIN g/dL 9.3* 11.1*   HEMATOCRIT % 27.6* 33.7*   PLATELETS 10*3/mm3 246 304     Results from last 7 days   Lab Units 01/20/20  0413 01/19/20  1416 01/18/20  1448   SODIUM mmol/L 136 134* 139   POTASSIUM mmol/L 4.3 4.5 4.5   CHLORIDE mmol/L 102 101 103   CO2 mmol/L 25.0 25.0 24.0   BUN mg/dL 63* 60* 59*   CREATININE mg/dL 2.29* 2.37* 2.31*   CALCIUM mg/dL 8.5* 8.3* 8.9   BILIRUBIN mg/dL 1.0 0.9 1.5*   ALK PHOS U/L 112 114 125*   ALT (SGPT) U/L 50* 54* 70*   AST (SGOT) U/L 52* 40 53*   GLUCOSE mg/dL 98 113* 106*     I have reviewed the patient current medications.     Assessment/Plan     Active Hospital Problems    Diagnosis   • **Acute on chronic systolic heart failure (CMS/HCC)   • Severe pulmonary hypertension (CMS/HCC)   • Non-sustained ventricular tachycardia (CMS/HCC)   • COPD, group B, by GOLD 2017 classification (CMS/HCC)   • S/P implantation of automatic cardioverter/defibrillator (AICD)   • PVD (peripheral vascular disease) (CMS/HCC)   • Ischemic cardiomyopathy   • Coronary artery disease involving native coronary artery of native heart without angina pectoris     3.0 x28 mm Xience MELISA to the proximal LAD in 12/2018     • CKD (chronic kidney disease)   • HTN (hypertension)   • HLD (hyperlipidemia)     Plan:  1.  Appreciate cardiology assistance.  Will continue IV Lasix today, and likely plan to transition to oral therapy tomorrow.  Continue strict intake and output, daily weights, cardiac diet.  Renal function felt to be at or near baseline.  Electrolytes stable.  Ace wraps added for compression, encouraged elevation as well.  2.  Recent heart cath from 12/16/2019 showed nonobstructive CAD of major vessels, an acute marginal branch of RCA with smaller caliber with ostial disease.  Medical  management was recommended.  Continue aspirin, Lipitor, Coreg, and Entresto.  Hydralazine and Imdur added over the weekend.  Patient has had soft blood pressure readings, but overall stable.  3.  Hemoglobin 11.1 on 1/18 and 9.3 today.  No signs or symptoms of active bleeding.  Brilinta recently stopped due to GI bleed.  4.  Patient needs to upgrade to biventricular AICD.  He does have an appointment with Dr. Cantu in [place] in March.  5.  Labs in a.m.    Discharge Planning: I expect the patient to be discharged to home likely tomorrow.    Sandy Coreas, APRN   01/20/20   1:59 PM

## 2020-01-20 NOTE — PROGRESS NOTES
Jadon Flynn (Key: [id number])   Entresto 24-26MG tablets   Form  [employer] Electronic PA Form   Created   2 minutes ago   Sent to Plan   less than a minute ago   Plan Response   less than a minute ago   Submit Clinical Questions   Determination   N/A       Message from Plan  PA was already submitted for this patient and drug which was denied.;CaseId:[id number];Status:Denied;Appeal Information: Attention:[employer]ICAL APPEALS [employer] [address] [address],[place], [place],[address] Phone:[phone] Fax:[phone] WebAddress:[email or web address];

## 2020-01-20 NOTE — PROGRESS NOTES
[facility] -  Progress Note     LOS: 2 days   Patient Care Team:  Alli Perry MD as PCP - General  Alli Perry MD as PCP - Family Medicine  John Mckeon MD as Cardiologist (Cardiology)  Katie Parra APRN as Nurse Practitioner (Pulmonary Disease)  John Cantu MD as Cardiologist (Cardiac Electrophysiology)    Chief Complaint: Leg Swelling     Subjective     Interval History:   Patient with extensive heart history. Patient with known coronary artery disease with drug eluting stent to proximal LAD in December 2018 and history of stent to to LAD in 2011. History of Plavix resistance and GI bleeding with ulcer on Brilinta. Now on Aspirin only. Last cath done in December at outlying facility in December of 2019 (last month) with patent stents with with acute marginal branch of small caliber RCA with ostial disease- medical management was recommended at that time. Known ischemic cardiomyopathy since 2011- EF 24% at that time. Last echo in December 2018 with EF 31-35%. Echo noted at time of cath with EF 20%. Echo repeated here 1/18/20 with EF 20% and severe pulmonary hypertension, diastolic dysfunction, LA moderately dilated, mild MR, moderate TR. Patient with chronic kidney disease- appears near baseline. 2 recent admissions to [facility] for GI Bleeding- has received a significant amount of blood products over that time. Patient had Brilinta stopped. During most recent admission to [facility] BNP was [address]31 on 1/13- treated with IV Diuretics. Had Lisinopril stopped and then started on Entresto. BNP on this ER visit to [facility] was 14,929. Patient's chief complaint was leg swelling- some shortness of breath on exertion and orthopnea. Patient has now been treated with IV diuretics with good response and feels much better. Can now lie flat. Breathing much improved. Up and ambulatory in the [facility]. States his PCP had increased Lasix to 80  mg prior to most recent admission to [facility]. Leg swelling better but persists. Renal function stable - slight improvement since admission today.     Review of Systems:   Review of Systems   Constitution: Negative for chills, decreased appetite, fever, malaise/fatigue, weight gain and weight loss.   HENT: Negative for nosebleeds.    Eyes: Negative for visual disturbance.   Cardiovascular: Positive for leg swelling and orthopnea (much improved). Negative for chest pain, dyspnea on exertion, near-syncope, palpitations, paroxysmal nocturnal dyspnea and syncope.   Respiratory: Positive for shortness of breath. Negative for cough, hemoptysis and snoring.    Endocrine: Negative for cold intolerance and heat intolerance.   Hematologic/Lymphatic: Negative for bleeding problem. Does not bruise/bleed easily.   Skin: Negative for rash.   Musculoskeletal: Negative for back pain and falls.   Gastrointestinal: Negative for abdominal pain, constipation, diarrhea, heartburn, melena, nausea and vomiting.   Genitourinary: Negative for hematuria.   Neurological: Negative for dizziness, headaches and light-headedness.   Psychiatric/Behavioral: Negative for altered mental status.   Allergic/Immunologic: Negative for persistent infections.        Objective     Vital Sign Min/Max for last 24 hours  Temp  Min: 97 °F (36.1 °C)  Max: 98.2 °F (36.8 °C)   BP  Min: 90/52  Max: 107/57   Pulse  Min: 73  Max: 101   Resp  Min: 18  Max: 20   SpO2  Min: 95 %  Max: 98 %   No data recorded   Weight  Min: 100 kg (220 lb 9.6 oz)  Max: 100 kg (220 lb 9.6 oz)         01/19/20  1946   Weight: 100 kg (220 lb 9.6 oz)         Intake/Output Summary (Last 24 hours) at 1/20/2020 1056  Last data filed at 1/20/2020 0955  Gross per 24 hour   Intake 1340 ml   Output 2200 ml   Net -860 ml         Physical Exam:  Physical Exam   Constitutional: He is oriented to person, place, and time. He appears well-developed and well-nourished.   HENT:   Head: Normocephalic and  atraumatic.   Eyes: Pupils are equal, round, and reactive to light.   Neck: Normal range of motion. Neck supple. No JVD present. Carotid bruit is not present.   Cardiovascular: Normal rate, regular rhythm, normal heart sounds and intact distal pulses.   Pulmonary/Chest: Effort normal. He has decreased breath sounds.   Abdominal: Soft. Bowel sounds are normal.   Musculoskeletal: Normal range of motion. He exhibits edema (1-2+ pitting Left >Right ).   Neurological: He is alert and oriented to person, place, and time. He has normal reflexes.   Skin: Skin is warm and dry.   Psychiatric: He has a normal mood and affect. His behavior is normal. Judgment and thought content normal.        Results Review:   Lab Results (last 72 hours)     Procedure Component Value Units Date/Time    Comprehensive Metabolic Panel [[id number]]  (Abnormal) Collected:  01/20/20 0413    Specimen:  Blood Updated:  01/20/20 0507     Glucose 98 mg/dL      BUN 63 mg/dL      Creatinine 2.29 mg/dL      Sodium 136 mmol/L      Potassium 4.3 mmol/L      Chloride 102 mmol/L      CO2 25.0 mmol/L      Calcium 8.5 mg/dL      Total Protein 5.7 g/dL      Albumin 3.50 g/dL      ALT (SGPT) 50 U/L      AST (SGOT) 52 U/L      Alkaline Phosphatase 112 U/L      Total Bilirubin 1.0 mg/dL      eGFR Non African Amer 28 mL/min/1.73      Globulin 2.2 gm/dL      A/G Ratio 1.6 g/dL      BUN/Creatinine Ratio 27.5     Anion Gap 9.0 mmol/L     Narrative:       GFR Normal >60  Chronic Kidney Disease <60  Kidney Failure <15      CBC (No Diff) [[id number]]  (Abnormal) Collected:  01/20/20 0413    Specimen:  Blood Updated:  01/20/20 0451     WBC 8.52 10*3/mm3      RBC 2.97 10*6/mm3      Hemoglobin 9.3 g/dL      Hematocrit 27.6 %      MCV 92.9 fL      MCH 31.3 pg      MCHC 33.7 g/dL      RDW 20.3 %      RDW-SD 68.5 fl      MPV 14.1 fL      Platelets 246 10*3/mm3     Comprehensive Metabolic Panel [[id number]]  (Abnormal) Collected:  01/19/20 1416    Specimen:  Blood Updated:  01/19/20  1507     Glucose 113 mg/dL      BUN 60 mg/dL      Creatinine 2.37 mg/dL      Sodium 134 mmol/L      Potassium 4.5 mmol/L      Chloride 101 mmol/L      CO2 25.0 mmol/L      Calcium 8.3 mg/dL      Total Protein 5.8 g/dL      Albumin 3.70 g/dL      ALT (SGPT) 54 U/L      AST (SGOT) 40 U/L      Alkaline Phosphatase 114 U/L      Total Bilirubin 0.9 mg/dL      eGFR Non African Amer 27 mL/min/1.73      Globulin 2.1 gm/dL      A/G Ratio 1.8 g/dL      BUN/Creatinine Ratio 25.3     Anion Gap 8.0 mmol/L     Narrative:       GFR Normal >60  Chronic Kidney Disease <60  Kidney Failure <15      Extra Tubes [[id number]] Collected:  01/19/20 0340    Specimen:  Blood, Venous Line Updated:  01/19/20 0700    Narrative:       The following orders were created for panel order Extra Tubes.  Procedure                               Abnormality         Status                     ---------                               -----------         ------                     Lavender Top[[id number]]                                     Final result                 Please view results for these tests on the individual orders.    Lavender Top [[id number]] Collected:  01/19/20 0340    Specimen:  Blood Updated:  01/19/20 0700     Extra Tube hold for add-on     Comment: Auto resulted       Troponin [[id number]]  (Abnormal) Collected:  01/19/20 0334    Specimen:  Blood Updated:  01/19/20 0426     Troponin T 0.038 ng/mL     Narrative:       Troponin T Reference Range:  <= 0.03 ng/mL-   Negative for AMI  >0.03 ng/mL-     Abnormal for myocardial necrosis.  Clinicians would have to utilize clinical acumen, EKG, Troponin and serial changes to determine if it is an Acute Myocardial Infarction or myocardial injury due to an underlying chronic condition.       Results may be falsely decreased if patient taking Biotin.      Troponin [[id number]]  (Normal) Collected:  01/18/20 2017    Specimen:  Blood Updated:  01/18/20 2103     Troponin T 0.030 ng/mL     Narrative:        Troponin T Reference Range:  <= 0.03 ng/mL-   Negative for AMI  >0.03 ng/mL-     Abnormal for myocardial necrosis.  Clinicians would have to utilize clinical acumen, EKG, Troponin and serial changes to determine if it is an Acute Myocardial Infarction or myocardial injury due to an underlying chronic condition.       Results may be falsely decreased if patient taking Biotin.      TSH [[id number]]  (Normal) Collected:  01/18/20 1448    Specimen:  Blood Updated:  01/18/20 1521     TSH 2.990 uIU/mL     Troponin [[id number]]  (Normal) Collected:  01/18/20 1448    Specimen:  Blood Updated:  01/18/20 1520     Troponin T 0.028 ng/mL     Narrative:       Troponin T Reference Range:  <= 0.03 ng/mL-   Negative for AMI  >0.03 ng/mL-     Abnormal for myocardial necrosis.  Clinicians would have to utilize clinical acumen, EKG, Troponin and serial changes to determine if it is an Acute Myocardial Infarction or myocardial injury due to an underlying chronic condition.       Results may be falsely decreased if patient taking Biotin.      Comprehensive Metabolic Panel [[id number]]  (Abnormal) Collected:  01/18/20 1448    Specimen:  Blood Updated:  01/18/20 1518     Glucose 106 mg/dL      BUN 59 mg/dL      Creatinine 2.31 mg/dL      Sodium 139 mmol/L      Potassium 4.5 mmol/L      Chloride 103 mmol/L      CO2 24.0 mmol/L      Calcium 8.9 mg/dL      Total Protein 6.5 g/dL      Albumin 4.10 g/dL      ALT (SGPT) 70 U/L      AST (SGOT) 53 U/L      Alkaline Phosphatase 125 U/L      Total Bilirubin 1.5 mg/dL      eGFR Non African Amer 28 mL/min/1.73      Globulin 2.4 gm/dL      A/G Ratio 1.7 g/dL      BUN/Creatinine Ratio 25.5     Anion Gap 12.0 mmol/L     Narrative:       GFR Normal >60  Chronic Kidney Disease <60  Kidney Failure <15      BNP [[id number]]  (Abnormal) Collected:  01/18/20 1448    Specimen:  Blood Updated:  01/18/20 1518     proBNP 14,929.0 pg/mL     Narrative:       Among patients with dyspnea, NT-proBNP is highly sensitive  for the detection of acute congestive heart failure. In addition NT-proBNP of <300 pg/ml effectively rules out acute congestive heart failure with 99% negative predictive value.    Results may be falsely decreased if patient taking Biotin.      Hemoglobin A1c [[id number]]  (Abnormal) Collected:  01/18/20 1448    Specimen:  Blood Updated:  01/18/20 1510     Hemoglobin A1C 6.40 %     Narrative:       Hemoglobin A1C Ranges:    Increased Risk for Diabetes  5.7% to 6.4%  Diabetes                     >= 6.5%  Diabetic Goal                < 7.0%    Protime-INR [[id number]]  (Abnormal) Collected:  01/18/20 1448    Specimen:  Blood Updated:  01/18/20 1505     Protime 15.4 Seconds      INR 1.18    CBC & Differential [[id number]] Collected:  01/18/20 1448    Specimen:  Blood Updated:  01/18/20 1458    Narrative:       The following orders were created for panel order CBC & Differential.  Procedure                               Abnormality         Status                     ---------                               -----------         ------                     CBC Auto Differential[[id number]]        Abnormal            Final result                 Please view results for these tests on the individual orders.    CBC Auto Differential [[id number]]  (Abnormal) Collected:  01/18/20 1448    Specimen:  Blood Updated:  01/18/20 1458     WBC 10.22 10*3/mm3      RBC 3.58 10*6/mm3      Hemoglobin 11.1 g/dL      Hematocrit 33.7 %      MCV 94.1 fL      MCH 31.0 pg      MCHC 32.9 g/dL      RDW 20.6 %      RDW-SD 70.4 fl      MPV 14.0 fL      Platelets 304 10*3/mm3      Neutrophil % 71.1 %      Lymphocyte % 14.2 %      Monocyte % 9.4 %      Eosinophil % 4.0 %      Basophil % 0.7 %      Neutrophils, Absolute 7.27 10*3/mm3      Lymphocytes, Absolute 1.45 10*3/mm3      Monocytes, Absolute 0.96 10*3/mm3      Eosinophils, Absolute 0.41 10*3/mm3      Basophils, Absolute 0.07 10*3/mm3               Echo EF Estimated  Lab Results   Component Value Date       ECHOEFEST 20 01/18/2020         Cath Ejection Fraction Quantitative  No results found for: CATHEF        Medication Review: yes  Current Facility-Administered Medications   Medication Dose Route Frequency Provider Last Rate Last Dose   • allopurinol (ZYLOPRIM) tablet 100 mg  100 mg Oral BID North Gregg MD   100 mg at 01/20/20 1027   • aspirin EC tablet 81 mg  81 mg Oral Daily North Gregg MD   81 mg at 01/20/20 0827   • atorvastatin (LIPITOR) tablet 40 mg  40 mg Oral Daily North Gregg MD   40 mg at 01/20/20 0827   • carvedilol (COREG) tablet 25 mg  25 mg Oral BID With Meals North Gregg MD   25 mg at 01/20/20 1027   • enoxaparin (LOVENOX) syringe 40 mg  40 mg Subcutaneous Q24H North Gregg MD   40 mg at 01/19/20 1511   • famotidine (PEPCID) tablet 40 mg  40 mg Oral Daily North Gregg MD   40 mg at 01/20/20 0827   • furosemide (LASIX) injection 40 mg  40 mg Intravenous BID Sandy Coreas APRN   40 mg at 01/20/20 0827   • hydrALAZINE (APRESOLINE) tablet 10 mg  10 mg Oral Q8H Washington Subramanian MD   10 mg at 01/20/20 0519   • HYDROcodone-acetaminophen (NORCO) 7.5-325 MG per tablet 1 tablet  1 tablet Oral Q6H PRN North Gregg MD   1 tablet at 01/20/20 0135   • isosorbide mononitrate (IMDUR) 24 hr tablet 30 mg  30 mg Oral Q24H Black Chua DO       • pantoprazole (PROTONIX) EC tablet 40 mg  40 mg Oral BID North Gregg MD   40 mg at 01/20/20 0827   • sacubitril-valsartan (ENTRESTO) 24-26 MG tablet 1 tablet  1 tablet Oral Q12H North Gregg MD   1 tablet at 01/20/20 1027   • sodium chloride 0.9 % flush 10 mL  10 mL Intravenous Q12H North Gregg MD   10 mL at 01/20/20 0828   • sodium chloride 0.9 % flush 10 mL  10 mL Intravenous PRN North Gregg MD             Assessment/Plan       Acute on chronic systolic heart failure (CMS/HCC)    PVD (peripheral vascular disease) (CMS/HCC)    Coronary artery disease involving native coronary artery  of native heart without angina pectoris    S/P implantation of automatic cardioverter/defibrillator (AICD)    HLD (hyperlipidemia)    Ischemic cardiomyopathy    HTN (hypertension)    CKD (chronic kidney disease)    COPD, group B, by GOLD 2017 classification (CMS/[facility])    Severe pulmonary hypertension (CMS/HCC)    Non-sustained ventricular tachycardia (CMS/[facility])    Plan:  Continue Coreg, Entresto. Likely can switch to PO Lasix soon. Compress legs. Daily weights and strict I&O. Suspect volume overload is due to numerous blood products patient received over last month. Started on Isordil and Hydralazine on admission- will defer to Dr. Mckeon to continue these or not.   Severe pulmonary hypertension. Recent worsening.  CAD- no angina a this time- recent cath at [facility] last month. On Aspirin. Brilinta stopped due to GI bleed. History of plavix resistance  CKD- appear near baseline  In terms of upgrade to device- he follows with Dr. Cantu- he believes he has an appointment in March- will need to varify this    OLI Sharp  01/20/20  10:56 AM

## 2020-01-21 NOTE — DISCHARGE SUMMARY
[facility]  DISCHARGE SUMMARY       Date of Admission: 1/18/2020  Date of Discharge:  1/21/2020  Primary Care Physician: Alli Perry MD    Presenting Problem/History of Present Illness:  CHF (congestive heart failure) (CMS/[facility]) [I50.9]     Final Discharge Diagnoses:  Active Hospital Problems    Diagnosis   • **Acute on chronic systolic heart failure (CMS/[facility])   • Severe pulmonary hypertension (CMS/[facility])   • Non-sustained ventricular tachycardia (CMS/[facility])   • COPD, group B, by GOLD 2017 classification (CMS/[facility])   • S/P implantation of automatic cardioverter/defibrillator (AICD)   • PVD (peripheral vascular disease) (CMS/[facility])   • Ischemic cardiomyopathy   • Coronary artery disease involving native coronary artery of native heart without angina pectoris     3.0 x28 mm Xience MELISA to the proximal LAD in 12/2018     • CKD (chronic kidney disease)   • HTN (hypertension)   • HLD (hyperlipidemia)       Consults: Dr. Subramanian with cardiology.    Procedures Performed:   Imaging Results (Last 72 Hours)     Procedure Component Value Units Date/Time    XR Chest PA & Lateral [[id number]] Collected:  01/18/20 1650     Updated:  01/18/20 1654    Narrative:       XR CHEST PA AND LATERAL- 1/18/2020 4:29 PM CST     HISTORY: shortness of breath       COMPARISON: Chest exam dated 07/05/2018.     FINDINGS:   Upright frontal and lateral radiographs of the chest were obtained.     No lung consolidation. No pleural effusion or pneumothorax.  Hyperexpanded lung volumes. Left chest wall AICD. Underlying  cardiomegaly which is stable. Mild central pulmonary congestion without  interstitial or johanna edema. The osseous structures and surrounding soft  tissues demonstrate no acute abnormality.       Impression:       1. Underlying cardiomegaly which is stable. There is a mild central  pulmonary vascular congestion which is also stable. No visualized  infiltrate or new pulmonary edema.     This  report was finalized on 01/18/2020 16:51 by Dr Owen Causey, .        Pertinent Test Results:   Lab Results (last 72 hours)     Procedure Component Value Units Date/Time    Comprehensive Metabolic Panel [[id number]]  (Abnormal) Collected:  01/20/20 0413    Specimen:  Blood Updated:  01/20/20 0507     Glucose 98 mg/dL      BUN 63 mg/dL      Creatinine 2.29 mg/dL      Sodium 136 mmol/L      Potassium 4.3 mmol/L      Chloride 102 mmol/L      CO2 25.0 mmol/L      Calcium 8.5 mg/dL      Total Protein 5.7 g/dL      Albumin 3.50 g/dL      ALT (SGPT) 50 U/L      AST (SGOT) 52 U/L      Alkaline Phosphatase 112 U/L      Total Bilirubin 1.0 mg/dL      eGFR Non African Amer 28 mL/min/1.73      Globulin 2.2 gm/dL      A/G Ratio 1.6 g/dL      BUN/Creatinine Ratio 27.5     Anion Gap 9.0 mmol/L     Narrative:       GFR Normal >60  Chronic Kidney Disease <60  Kidney Failure <15      CBC (No Diff) [[id number]]  (Abnormal) Collected:  01/20/20 0413    Specimen:  Blood Updated:  01/20/20 0451     WBC 8.52 10*3/mm3      RBC 2.97 10*6/mm3      Hemoglobin 9.3 g/dL      Hematocrit 27.6 %      MCV 92.9 fL      MCH 31.3 pg      MCHC 33.7 g/dL      RDW 20.3 %      RDW-SD 68.5 fl      MPV 14.1 fL      Platelets 246 10*3/mm3     Comprehensive Metabolic Panel [[id number]]  (Abnormal) Collected:  01/19/20 1416    Specimen:  Blood Updated:  01/19/20 1507     Glucose 113 mg/dL      BUN 60 mg/dL      Creatinine 2.37 mg/dL      Sodium 134 mmol/L      Potassium 4.5 mmol/L      Chloride 101 mmol/L      CO2 25.0 mmol/L      Calcium 8.3 mg/dL      Total Protein 5.8 g/dL      Albumin 3.70 g/dL      ALT (SGPT) 54 U/L      AST (SGOT) 40 U/L      Alkaline Phosphatase 114 U/L      Total Bilirubin 0.9 mg/dL      eGFR Non African Amer 27 mL/min/1.73      Globulin 2.1 gm/dL      A/G Ratio 1.8 g/dL      BUN/Creatinine Ratio 25.3     Anion Gap 8.0 mmol/L     Narrative:       GFR Normal >60  Chronic Kidney Disease <60  Kidney Failure <15      Extra Tubes [[id number]]  Collected:  01/19/20 0340    Specimen:  Blood, Venous Line Updated:  01/19/20 0700    Narrative:       The following orders were created for panel order Extra Tubes.  Procedure                               Abnormality         Status                     ---------                               -----------         ------                     Lavender Top[[id number]]                                     Final result                 Please view results for these tests on the individual orders.    Lavender Top [[id number]] Collected:  01/19/20 0340    Specimen:  Blood Updated:  01/19/20 0700     Extra Tube hold for add-on     Comment: Auto resulted       Troponin [[id number]]  (Abnormal) Collected:  01/19/20 0334    Specimen:  Blood Updated:  01/19/20 0426     Troponin T 0.038 ng/mL     Narrative:       Troponin T Reference Range:  <= 0.03 ng/mL-   Negative for AMI  >0.03 ng/mL-     Abnormal for myocardial necrosis.  Clinicians would have to utilize clinical acumen, EKG, Troponin and serial changes to determine if it is an Acute Myocardial Infarction or myocardial injury due to an underlying chronic condition.       Results may be falsely decreased if patient taking Biotin.      Troponin [[id number]]  (Normal) Collected:  01/18/20 2017    Specimen:  Blood Updated:  01/18/20 2103     Troponin T 0.030 ng/mL     Narrative:       Troponin T Reference Range:  <= 0.03 ng/mL-   Negative for AMI  >0.03 ng/mL-     Abnormal for myocardial necrosis.  Clinicians would have to utilize clinical acumen, EKG, Troponin and serial changes to determine if it is an Acute Myocardial Infarction or myocardial injury due to an underlying chronic condition.       Results may be falsely decreased if patient taking Biotin.      TSH [[id number]]  (Normal) Collected:  01/18/20 1448    Specimen:  Blood Updated:  01/18/20 1521     TSH 2.990 uIU/mL     Troponin [[id number]]  (Normal) Collected:  01/18/20 1448    Specimen:  Blood Updated:  01/18/20 1520      Troponin T 0.028 ng/mL     Narrative:       Troponin T Reference Range:  <= 0.03 ng/mL-   Negative for AMI  >0.03 ng/mL-     Abnormal for myocardial necrosis.  Clinicians would have to utilize clinical acumen, EKG, Troponin and serial changes to determine if it is an Acute Myocardial Infarction or myocardial injury due to an underlying chronic condition.       Results may be falsely decreased if patient taking Biotin.      Comprehensive Metabolic Panel [[id number]]  (Abnormal) Collected:  01/18/20 1448    Specimen:  Blood Updated:  01/18/20 1518     Glucose 106 mg/dL      BUN 59 mg/dL      Creatinine 2.31 mg/dL      Sodium 139 mmol/L      Potassium 4.5 mmol/L      Chloride 103 mmol/L      CO2 24.0 mmol/L      Calcium 8.9 mg/dL      Total Protein 6.5 g/dL      Albumin 4.10 g/dL      ALT (SGPT) 70 U/L      AST (SGOT) 53 U/L      Alkaline Phosphatase 125 U/L      Total Bilirubin 1.5 mg/dL      eGFR Non African Amer 28 mL/min/1.73      Globulin 2.4 gm/dL      A/G Ratio 1.7 g/dL      BUN/Creatinine Ratio 25.5     Anion Gap 12.0 mmol/L     Narrative:       GFR Normal >60  Chronic Kidney Disease <60  Kidney Failure <15      BNP [[id number]]  (Abnormal) Collected:  01/18/20 1448    Specimen:  Blood Updated:  01/18/20 1518     proBNP 14,929.0 pg/mL     Narrative:       Among patients with dyspnea, NT-proBNP is highly sensitive for the detection of acute congestive heart failure. In addition NT-proBNP of <300 pg/ml effectively rules out acute congestive heart failure with 99% negative predictive value.    Results may be falsely decreased if patient taking Biotin.      Hemoglobin A1c [[id number]]  (Abnormal) Collected:  01/18/20 1448    Specimen:  Blood Updated:  01/18/20 1510     Hemoglobin A1C 6.40 %     Narrative:       Hemoglobin A1C Ranges:    Increased Risk for Diabetes  5.7% to 6.4%  Diabetes                     >= 6.5%  Diabetic Goal                < 7.0%    Protime-INR [[id number]]  (Abnormal) Collected:  01/18/20 1448     Specimen:  Blood Updated:  01/18/20 1505     Protime 15.4 Seconds      INR 1.18    CBC & Differential [[id number]] Collected:  01/18/20 1448    Specimen:  Blood Updated:  01/18/20 1458    Narrative:       The following orders were created for panel order CBC & Differential.  Procedure                               Abnormality         Status                     ---------                               -----------         ------                     CBC Auto Differential[[id number]]        Abnormal            Final result                 Please view results for these tests on the individual orders.    CBC Auto Differential [[id number]]  (Abnormal) Collected:  01/18/20 1448    Specimen:  Blood Updated:  01/18/20 1458     WBC 10.22 10*3/mm3      RBC 3.58 10*6/mm3      Hemoglobin 11.1 g/dL      Hematocrit 33.7 %      MCV 94.1 fL      MCH 31.0 pg      MCHC 32.9 g/dL      RDW 20.6 %      RDW-SD 70.4 fl      MPV 14.0 fL      Platelets 304 10*3/mm3      Neutrophil % 71.1 %      Lymphocyte % 14.2 %      Monocyte % 9.4 %      Eosinophil % 4.0 %      Basophil % 0.7 %      Neutrophils, Absolute 7.27 10*3/mm3      Lymphocytes, Absolute 1.45 10*3/mm3      Monocytes, Absolute 0.96 10*3/mm3      Eosinophils, Absolute 0.41 10*3/mm3      Basophils, Absolute 0.07 10*3/mm3         Chief Complaint on Day of Discharge: Overall, patient tells me he is feeling much better and is ready to go home.    Hospital Course:  The patient is a 69 y.o. male who presented to [facility] with shortness of breath.  He has a past medical history of COPD, systolic heart failure, gastrointestinal bleed, and chronic kidney disease.  He was recently hospitalized 1/12 to 1/16 for GI bleed, at which time he received IV fluids as well as 2 units of packed red blood cells.  He was discharged on 1/16 with instructions to discontinue use of Brilinta.  Patient reported that he began to experience significant swelling in his lower extremities on Friday along  with worsening shortness of breath.  Patient presented to [facility] on 1/18 for worsening shortness of breath, orthopnea, and paroxysmal nocturnal dyspnea.  The patient had tried to sleep in his recliner without any improvement.  He denies subjective fever or chills.  He denies any recent sick contacts.  He denies nausea, vomiting, diarrhea or abdominal pain.  Patient reports he normally wears 2 L nasal cannula at bedtime.  However, he had to increase his oxygen up to 3-1/2 L without any significant improvement of his shortness of breath.  He was admitted to the hospitalist service for further evaluation and management.    Was felt that the patient was volume overloaded due to numerous blood products from recent GI bleed.  The patient was diuresed with IV Lasix twice daily dosing.  He will be transitioned to his home dose of oral Lasix.  Patient has reported good diuresis.  Brilinta was held during hospitalization and aspirin was continued.  Transthoracic echocardiogram was obtained, it revealed left ventricular systolic function severely decreased with an ejection fraction of 20% and left ventricular diastolic dysfunction.  Severe pulmonary hypertension.  Previously in December 2018, ejection fraction was noted to be 31 to 35%.  Recent heart cath from 12/16/2019 showed nonobstructive CAD of major vessels, an acute marginal branch of RCA with smaller caliber with ostial disease.  Medical management was recommended.  He was seen in consultation by Dr. Subramanian with cardiology.  The patient was started on hydralazine and Imdur in addition to his home medicines of aspirin, Lipitor, Coreg, and Entresto.  The patient follows with Dr. Cantu in [place], he will need biventricular AICD in the near future.  Patient reports he has an upcoming appointment in March.  Will clarify when appointment is prior to discharge.  He has been cleared by cardiology for discharge.  He has remained chest pain-free and denies  "shortness of breath.  He is on room air and is hemodynamically stable.  Patient was monitored with strict intake and output, daily weights and cardiac diet.  Renal function is felt to be near baseline.  Electrolytes are stable.  Ace wraps have been added for compression to his lower extremities.  Today, the patient tells me he is breathing has much improved.  He has been able to sleep lying flat the last 2 nights.  He was seen ambulating in the wills without difficulty.  Patient tells me he is feeling much better and is ready to go home.  He does have some mild swelling in his lower extremities, although, he tells me that the Ace wraps are helping.  The patient will be discharged with instructions to continue aspirin, Lasix, Lipitor, Coreg and Entresto.  He will not be resumed on hydralazine and Imdur upon discharge.  Brilinta remains on hold at this time due to recent GI bleed.  The patient is to follow-up with his scheduled appointment with Dr. Cantu regarding upgrade to biventricular AICD.  He is to follow-up with his primary care provider within 1 week.  He is to follow-up with cardiology per recommendations.    Condition on Discharge:  Medically stable.    Physical Exam on Discharge:  /54 (BP Location: Left arm, Patient Position: Sitting)   Pulse 78   Temp 98.1 °F (36.7 °C) (Oral)   Resp 18   Ht 193 cm (75.98\")   Wt 99.7 kg (219 lb 12.8 oz)   SpO2 95%   BMI 26.77 kg/m²   Physical Exam  Constitutional: He is oriented to person, place, and time. He appears well-developed and well-nourished. No distress.   HENT:   Head: Normocephalic and atraumatic.   Neck: Normal range of motion. Neck supple. No JVD present. No tracheal deviation present.   Cardiovascular: Normal rate, regular rhythm and intact distal pulses. Exam reveals no gallop and no friction rub.     Sinus 74-85 with a bundle branch block and PVCs  Pulmonary/Chest: Effort normal. He has no wheezes. He has no rales.   Abdominal: Soft. Bowel " sounds are normal. He exhibits no distension. There is no tenderness. There is no guarding.   Musculoskeletal: He exhibits edema (BLE mild 1+ edema- now wrapped with ACE wrap). He exhibits no tenderness.   Neurological: He is alert and oriented to person, place, and time. No cranial nerve deficit.   Skin: Skin is warm and dry. No rash noted. No erythema.   Psychiatric: He has a normal mood and affect. His behavior is normal. Judgment and thought content normal.   Vitals reviewed.    Discharge Disposition:  Home or Self Care    Discharge Medications:     Discharge Medications      Continue These Medications      Instructions Start Date   albuterol sulfate  (90 Base) MCG/ACT inhaler  Commonly known as:  PROVENTIL HFA;VENTOLIN HFA;PROAIR HFA   2 puffs, Inhalation, Every 4 Hours PRN      allopurinol 100 MG tablet  Commonly known as:  ZYLOPRIM   100 mg, Oral, 2 Times Daily      aspirin 81 MG EC tablet   81 mg, Oral, Daily      atorvastatin 40 MG tablet  Commonly known as:  LIPITOR   40 mg, Oral, Daily      carvedilol 25 MG tablet  Commonly known as:  COREG   25 mg, Oral, 2 Times Daily With Meals      famotidine 40 MG tablet  Commonly known as:  PEPCID   40 mg, Oral, 2 Times Daily      furosemide 40 MG tablet  Commonly known as:  LASIX   40 mg, Oral, 2 Times Daily      HYDROcodone-acetaminophen 7.5-325 MG per tablet  Commonly known as:  NORCO   1 tablet, Oral, Every 8 Hours PRN      metOLazone 2.5 MG tablet  Commonly known as:  ZAROXOLYN   2.5 mg, Oral, Every Other Day, Monday Wednesday Friday      ONE-A-DAY MENS 50+ ADVANTAGE tablet   1 tablet, Oral, Daily      pantoprazole 40 MG EC tablet  Commonly known as:  PROTONIX   40 mg, Oral, 2 Times Daily      sacubitril-valsartan 24-26 MG tablet  Commonly known as:  ENTRESTO   1 tablet, Oral, 2 Times Daily         Stop These Medications    ticagrelor 90 MG tablet tablet  Commonly known as:  BRILINTA          Discharge Diet:   Diet Instructions     Diet: Regular, Cardiac;  Thin      Discharge Diet:   Regular  Cardiac       Fluid Consistency:  Thin        Heart healthy diet  Low sodium                Activity at Discharge:   Activity Instructions     Activity as Tolerated      Measure Weight      Additional Activity Instructions:      Activity as tolerated                Discharge Care Plan/Instructions:   1.  Return for worsening symptoms.  2.  Use Ace wraps for compression to lower extremities.    Follow-up Appointments:   Future Appointments   Date Time Provider Department Center   2/5/2020  9:00 AM Katie Parra APRN [facility] None   2/5/2020  1:00 PM [facility] NP2 [facility]   2/25/2020  8:00 AM PAD US NIVAS CART 1 [facility] PAD US PAD   2/25/2020  9:00 AM PAD US NIVAS CART 1 BH PAD US PAD   2/25/2020 10:00 AM BickingDave DO [facility] None   4/1/2020 11:15 AM Katie Parra APRN [facility] None   4/6/2020  9:45 AM John Mckeon MD [facility]   7/14/2020 12:30 PM [facility] HE[facility] G[facility]   1.  He is to follow-up with his primary care provider within 1 week.    2.  He is to follow-up with cardiology per recommendations.  3.  The patient is to follow-up with his scheduled appointment with Dr. Cantu regarding upgrade to biventricular AICD.      Test Results Pending at Discharge: None.    Cassandra Smyth, APRN  01/21/20  3:39 PM    Time: 40 minutes.

## 2020-01-21 NOTE — PROGRESS NOTES
[facility] -  Progress Note     LOS: 3 days   Patient Care Team:  Alli Perry MD as PCP - General  Alli Perry MD as PCP - Family Medicine  John Mckeon MD as Cardiologist (Cardiology)  Katie Parra APRN as Nurse Practitioner (Pulmonary Disease)  John Cantu MD as Cardiologist (Cardiac Electrophysiology)    Chief Complaint: Leg Swelling     Subjective     Interval History:   Patient feels better today. Shortness of breath at baseline. Leg swelling improved with compression. Ready for discharge today.     Review of Systems:   Review of Systems   Constitution: Negative for chills, decreased appetite, fever, malaise/fatigue, weight gain and weight loss.   HENT: Negative for nosebleeds.    Eyes: Negative for visual disturbance.   Cardiovascular: Positive for leg swelling and orthopnea (much improved). Negative for chest pain, dyspnea on exertion, near-syncope, palpitations, paroxysmal nocturnal dyspnea and syncope.   Respiratory: Positive for shortness of breath. Negative for cough, hemoptysis and snoring.    Endocrine: Negative for cold intolerance and heat intolerance.   Hematologic/Lymphatic: Negative for bleeding problem. Does not bruise/bleed easily.   Skin: Negative for rash.   Musculoskeletal: Negative for back pain and falls.   Gastrointestinal: Negative for abdominal pain, constipation, diarrhea, heartburn, melena, nausea and vomiting.   Genitourinary: Negative for hematuria.   Neurological: Negative for dizziness, headaches and light-headedness.   Psychiatric/Behavioral: Negative for altered mental status.   Allergic/Immunologic: Negative for persistent infections.        Objective     Vital Sign Min/Max for last 24 hours  Temp  Min: 97.5 °F (36.4 °C)  Max: 98.3 °F (36.8 °C)   BP  Min: 88/60  Max: 103/59   Pulse  Min: 72  Max: 84   Resp  Min: 16  Max: 18   SpO2  Min: 94 %  Max: 98 %   No data recorded   Weight  Min: 99.7 kg (219 lb 12.8 oz)  Max: 99.7 kg (219  lb 12.8 oz)         01/20/20 2035   Weight: 99.7 kg (219 lb 12.8 oz)         Intake/Output Summary (Last 24 hours) at 1/21/2020 0956  Last data filed at 1/21/2020 0753  Gross per 24 hour   Intake --   Output 2275 ml   Net -2275 ml         Physical Exam:  Physical Exam   Constitutional: He is oriented to person, place, and time. He appears well-developed and well-nourished.   HENT:   Head: Normocephalic and atraumatic.   Eyes: Pupils are equal, round, and reactive to light.   Neck: Normal range of motion. Neck supple. No JVD present. Carotid bruit is not present.   Cardiovascular: Normal rate, regular rhythm, normal heart sounds and intact distal pulses.   Pulmonary/Chest: Effort normal. He has decreased breath sounds.   Abdominal: Soft. Bowel sounds are normal.   Musculoskeletal: Normal range of motion. He exhibits edema (Left Greater than Right. Improved).   Neurological: He is alert and oriented to person, place, and time. He has normal reflexes.   Skin: Skin is warm and dry.   Psychiatric: He has a normal mood and affect. His behavior is normal. Judgment and thought content normal.        Results Review:   Lab Results (last 72 hours)     Procedure Component Value Units Date/Time    Comprehensive Metabolic Panel [[id number]]  (Abnormal) Collected:  01/20/20 0413    Specimen:  Blood Updated:  01/20/20 0507     Glucose 98 mg/dL      BUN 63 mg/dL      Creatinine 2.29 mg/dL      Sodium 136 mmol/L      Potassium 4.3 mmol/L      Chloride 102 mmol/L      CO2 25.0 mmol/L      Calcium 8.5 mg/dL      Total Protein 5.7 g/dL      Albumin 3.50 g/dL      ALT (SGPT) 50 U/L      AST (SGOT) 52 U/L      Alkaline Phosphatase 112 U/L      Total Bilirubin 1.0 mg/dL      eGFR Non African Amer 28 mL/min/1.73      Globulin 2.2 gm/dL      A/G Ratio 1.6 g/dL      BUN/Creatinine Ratio 27.5     Anion Gap 9.0 mmol/L     Narrative:       GFR Normal >60  Chronic Kidney Disease <60  Kidney Failure <15      CBC (No Diff) [[id number]]  (Abnormal)  Collected:  01/20/20 0413    Specimen:  Blood Updated:  01/20/20 0451     WBC 8.52 10*3/mm3      RBC 2.97 10*6/mm3      Hemoglobin 9.3 g/dL      Hematocrit 27.6 %      MCV 92.9 fL      MCH 31.3 pg      MCHC 33.7 g/dL      RDW 20.3 %      RDW-SD 68.5 fl      MPV 14.1 fL      Platelets 246 10*3/mm3     Comprehensive Metabolic Panel [[id number]]  (Abnormal) Collected:  01/19/20 1416    Specimen:  Blood Updated:  01/19/20 1507     Glucose 113 mg/dL      BUN 60 mg/dL      Creatinine 2.37 mg/dL      Sodium 134 mmol/L      Potassium 4.5 mmol/L      Chloride 101 mmol/L      CO2 25.0 mmol/L      Calcium 8.3 mg/dL      Total Protein 5.8 g/dL      Albumin 3.70 g/dL      ALT (SGPT) 54 U/L      AST (SGOT) 40 U/L      Alkaline Phosphatase 114 U/L      Total Bilirubin 0.9 mg/dL      eGFR Non African Amer 27 mL/min/1.73      Globulin 2.1 gm/dL      A/G Ratio 1.8 g/dL      BUN/Creatinine Ratio 25.3     Anion Gap 8.0 mmol/L     Narrative:       GFR Normal >60  Chronic Kidney Disease <60  Kidney Failure <15      Extra Tubes [[id number]] Collected:  01/19/20 0340    Specimen:  Blood, Venous Line Updated:  01/19/20 0700    Narrative:       The following orders were created for panel order Extra Tubes.  Procedure                               Abnormality         Status                     ---------                               -----------         ------                     Lavender Top[[id number]]                                     Final result                 Please view results for these tests on the individual orders.    Lavender Top [[id number]] Collected:  01/19/20 0340    Specimen:  Blood Updated:  01/19/20 0700     Extra Tube hold for add-on     Comment: Auto resulted       Troponin [[id number]]  (Abnormal) Collected:  01/19/20 0334    Specimen:  Blood Updated:  01/19/20 0426     Troponin T 0.038 ng/mL     Narrative:       Troponin T Reference Range:  <= 0.03 ng/mL-   Negative for AMI  >0.03 ng/mL-     Abnormal for myocardial  necrosis.  Clinicians would have to utilize clinical acumen, EKG, Troponin and serial changes to determine if it is an Acute Myocardial Infarction or myocardial injury due to an underlying chronic condition.       Results may be falsely decreased if patient taking Biotin.      Troponin [[id number]]  (Normal) Collected:  01/18/20 2017    Specimen:  Blood Updated:  01/18/20 2103     Troponin T 0.030 ng/mL     Narrative:       Troponin T Reference Range:  <= 0.03 ng/mL-   Negative for AMI  >0.03 ng/mL-     Abnormal for myocardial necrosis.  Clinicians would have to utilize clinical acumen, EKG, Troponin and serial changes to determine if it is an Acute Myocardial Infarction or myocardial injury due to an underlying chronic condition.       Results may be falsely decreased if patient taking Biotin.      TSH [[id number]]  (Normal) Collected:  01/18/20 1448    Specimen:  Blood Updated:  01/18/20 1521     TSH 2.990 uIU/mL     Troponin [[id number]]  (Normal) Collected:  01/18/20 1448    Specimen:  Blood Updated:  01/18/20 1520     Troponin T 0.028 ng/mL     Narrative:       Troponin T Reference Range:  <= 0.03 ng/mL-   Negative for AMI  >0.03 ng/mL-     Abnormal for myocardial necrosis.  Clinicians would have to utilize clinical acumen, EKG, Troponin and serial changes to determine if it is an Acute Myocardial Infarction or myocardial injury due to an underlying chronic condition.       Results may be falsely decreased if patient taking Biotin.      Comprehensive Metabolic Panel [[id number]]  (Abnormal) Collected:  01/18/20 1448    Specimen:  Blood Updated:  01/18/20 1518     Glucose 106 mg/dL      BUN 59 mg/dL      Creatinine 2.31 mg/dL      Sodium 139 mmol/L      Potassium 4.5 mmol/L      Chloride 103 mmol/L      CO2 24.0 mmol/L      Calcium 8.9 mg/dL      Total Protein 6.5 g/dL      Albumin 4.10 g/dL      ALT (SGPT) 70 U/L      AST (SGOT) 53 U/L      Alkaline Phosphatase 125 U/L      Total Bilirubin 1.5 mg/dL      eGFR Non   Amer 28 mL/min/1.73      Globulin 2.4 gm/dL      A/G Ratio 1.7 g/dL      BUN/Creatinine Ratio 25.5     Anion Gap 12.0 mmol/L     Narrative:       GFR Normal >60  Chronic Kidney Disease <60  Kidney Failure <15      BNP [[id number]]  (Abnormal) Collected:  01/18/20 1448    Specimen:  Blood Updated:  01/18/20 1518     proBNP 14,929.0 pg/mL     Narrative:       Among patients with dyspnea, NT-proBNP is highly sensitive for the detection of acute congestive heart failure. In addition NT-proBNP of <300 pg/ml effectively rules out acute congestive heart failure with 99% negative predictive value.    Results may be falsely decreased if patient taking Biotin.      Hemoglobin A1c [[id number]]  (Abnormal) Collected:  01/18/20 1448    Specimen:  Blood Updated:  01/18/20 1510     Hemoglobin A1C 6.40 %     Narrative:       Hemoglobin A1C Ranges:    Increased Risk for Diabetes  5.7% to 6.4%  Diabetes                     >= 6.5%  Diabetic Goal                < 7.0%    Protime-INR [[id number]]  (Abnormal) Collected:  01/18/20 1448    Specimen:  Blood Updated:  01/18/20 1505     Protime 15.4 Seconds      INR 1.18    CBC & Differential [[id number]] Collected:  01/18/20 1448    Specimen:  Blood Updated:  01/18/20 1458    Narrative:       The following orders were created for panel order CBC & Differential.  Procedure                               Abnormality         Status                     ---------                               -----------         ------                     CBC Auto Differential[[id number]]        Abnormal            Final result                 Please view results for these tests on the individual orders.    CBC Auto Differential [[id number]]  (Abnormal) Collected:  01/18/20 1448    Specimen:  Blood Updated:  01/18/20 1458     WBC 10.22 10*3/mm3      RBC 3.58 10*6/mm3      Hemoglobin 11.1 g/dL      Hematocrit 33.7 %      MCV 94.1 fL      MCH 31.0 pg      MCHC 32.9 g/dL      RDW 20.6 %      RDW-SD 70.4 fl       MPV 14.0 fL      Platelets 304 10*3/mm3      Neutrophil % 71.1 %      Lymphocyte % 14.2 %      Monocyte % 9.4 %      Eosinophil % 4.0 %      Basophil % 0.7 %      Neutrophils, Absolute 7.27 10*3/mm3      Lymphocytes, Absolute 1.45 10*3/mm3      Monocytes, Absolute 0.96 10*3/mm3      Eosinophils, Absolute 0.41 10*3/mm3      Basophils, Absolute 0.07 10*3/mm3               Echo EF Estimated  Lab Results   Component Value Date    ECHOEFEST 20 01/18/2020         Cath Ejection Fraction Quantitative  No results found for: CATHEF        Medication Review: yes  Current Facility-Administered Medications   Medication Dose Route Frequency Provider Last Rate Last Dose   • allopurinol (ZYLOPRIM) tablet 100 mg  100 mg Oral BID North Gregg MD   100 mg at 01/21/20 0847   • aspirin EC tablet 81 mg  81 mg Oral Daily North Gregg MD   81 mg at 01/21/20 0847   • atorvastatin (LIPITOR) tablet 40 mg  40 mg Oral Daily North Gregg MD   40 mg at 01/21/20 0847   • carvedilol (COREG) tablet 25 mg  25 mg Oral BID With Meals North Gregg MD   25 mg at 01/20/20 1744   • enoxaparin (LOVENOX) syringe 40 mg  40 mg Subcutaneous Q24H North Gregg MD   40 mg at 01/20/20 1614   • famotidine (PEPCID) tablet 40 mg  40 mg Oral Daily North Gregg MD   40 mg at 01/21/20 0847   • furosemide (LASIX) injection 40 mg  40 mg Intravenous BID Sandy Coreas APRN   40 mg at 01/21/20 0847   • hydrALAZINE (APRESOLINE) tablet 10 mg  10 mg Oral Q8H [place] Subramanian MD   10 mg at 01/21/20 0619   • HYDROcodone-acetaminophen (NORCO) 7.5-325 MG per tablet 1 tablet  1 tablet Oral Q6H PRN North Gregg MD   1 tablet at 01/21/20 0311   • isosorbide mononitrate (IMDUR) 24 hr tablet 30 mg  30 mg Oral Q24H Black Chua DO   30 mg at 01/20/20 1256   • pantoprazole (PROTONIX) EC tablet 40 mg  40 mg Oral BID North Gregg MD   40 mg at 01/21/20 0847   • sacubitril-valsartan (ENTRESTO) 24-26 MG tablet 1 tablet  1  tablet Oral Q12H North Gregg MD   1 tablet at 01/21/20 0847   • sodium chloride 0.9 % flush 10 mL  10 mL Intravenous Q12H North Gregg MD   10 mL at 01/21/20 0853   • sodium chloride 0.9 % flush 10 mL  10 mL Intravenous PRN North Gregg MD             Assessment/Plan       Acute on chronic systolic heart failure (CMS/HCC)    PVD (peripheral vascular disease) (CMS/HCC)    Coronary artery disease involving native coronary artery of native heart without angina pectoris    S/P implantation of automatic cardioverter/defibrillator (AICD)    HLD (hyperlipidemia)    Ischemic cardiomyopathy    HTN (hypertension)    CKD (chronic kidney disease)    COPD, group B, by GOLD 2017 classification (CMS/HCC)    Severe pulmonary hypertension (CMS/HCC)    Non-sustained ventricular tachycardia (CMS/HCC)    Plan:  Continue Coreg, Entresto. Likely can switch to PO Lasix soon. Compress legs. Daily weights and strict I&O. Suspect volume overload is due to numerous blood products patient received over last month. Will not continue Imdur and Hydralazine after discussion with Dr. Mckeon.   CAD- no angina at this time- recent cath at [facility] last month. On Aspirin. Brilinta stopped due to GI bleed. History of plavix resistance  CKD- appear near baseline  In terms of upgrade to device- he follows with Dr. Cantu- he believes he has an appointment in March- will need to varify this    Further recommendations per Dr. Mike Bowie, APRN  01/21/20  9:56 AM

## 2020-01-21 NOTE — PLAN OF CARE
Problem: Patient Care Overview  Goal: Plan of Care Review  1/21/2020 0540 by Tracy Mullins, RNA  Outcome: Ongoing (interventions implemented as appropriate)  Flowsheets (Taken 1/21/2020 0539)  Progress: improving  Plan of Care Reviewed With: patient  Outcome Summary: Pt c/o neck pain, given prn pain medication. Sinus 74-85, bbb, frequent pvc's on tele. Room air. Up ad collins. A&O. Possible d/c today. continue to monitor.     Problem: Patient Care Overview  Goal: Individualization and Mutuality  1/21/2020 0540 by Tracy Mullins, RNA  Outcome: Ongoing (interventions implemented as appropriate)  Flowsheets (Taken 1/21/2020 0539)  Patient Specific Goals (Include Timeframe): d/c today  Patient Specific Preferences: likes coffee with creamer

## 2020-01-22 NOTE — OUTREACH NOTE
Prep Survey      Responses   Facility patient discharged from?  [place]   Is patient eligible?  Yes   Discharge diagnosis  Acute on chronic systolic heart failure    Does the patient have one of the following disease processes/diagnoses(primary or secondary)?  CHF   Does the patient have Home health ordered?  No   Is there a DME ordered?  No   Prep survey completed?  Yes          Sarita Miles RN

## 2020-01-22 NOTE — PAYOR COMM NOTE
"DC HOME 1-21-20  [id number]    [phone]  Lucero Flynn (69 y.o. Male)     Date of Birth Social Security Number Address Home Phone MRN    1950   [address] [place] [address] [phone] [id number]    Judaism Marital Status          Rastafari        Admission Date Admission Type Admitting Provider Attending Provider Department, Room/Bed    1/18/20 Urgent Black Chua [facility] 4B, 410/1    Discharge Date Discharge Disposition Discharge Destination        1/21/2020 Home or Self Care Home             Attending Provider:  (none)   Allergies:  No Known Allergies    Isolation:  None   Infection:  None   Code Status:  Prior    Ht:  193 cm (75.98\")   Wt:  99.7 kg (219 lb 12.8 oz)    Admission Cmt:  None   Principal Problem:  Acute on chronic systolic heart failure (CMS/HCC) [I50.23]                 Active Insurance as of 1/18/2020     Primary Coverage     Payor Plan Insurance Group Employer/Plan Group    AETNA MEDICARE REPLACEMENT AETNA MEDICARE REPLACEMENT [id number]     Payor Plan Address Payor Plan Phone Number Payor Plan Fax Number Effective Dates    [address] [phone]  1/1/2020 - None Entered    [place] [address]       Subscriber Name Subscriber Birth Date Member ID       LUCERO FLYNN [id number]                 Emergency Contacts      (Rel.) Home Phone Work Phone Mobile Phone    Kori Flynn (Daughter) [phone] -- --    LIONSYD ESCOBEDO (Spouse) [phone] -- --               Physician Progress Notes (last 7 days) (Notes from 01/15/20 0919 through 01/22/20 0919)      Sam Bowie APRN at 01/21/20 0954     Attestation signed by John Mckeon MD at 01/21/20 1128    I have personally reviewed EKG and telemetry which reveals SR    Heart- RRR  Lungs- Clear      New problems for this admission  Acute on chronic CHF, ready for d/c    Medical Decision Making:  Moderate Complexity    I have seen and " examined the patient and agree with the findings below                      [facility] -  Progress Note     LOS: 3 days   Patient Care Team:  Alli Perry MD as PCP - General  Alli Perry MD as PCP - Family Medicine  John Mckeon MD as Cardiologist (Cardiology)  Katie Parra APRN as Nurse Practitioner (Pulmonary Disease)  John Cantu MD as Cardiologist (Cardiac Electrophysiology)    Chief Complaint: Leg Swelling     Subjective     Interval History:   Patient feels better today. Shortness of breath at baseline. Leg swelling improved with compression. Ready for discharge today.     Review of Systems:   Review of Systems   Constitution: Negative for chills, decreased appetite, fever, malaise/fatigue, weight gain and weight loss.   HENT: Negative for nosebleeds.    Eyes: Negative for visual disturbance.   Cardiovascular: Positive for leg swelling and orthopnea (much improved). Negative for chest pain, dyspnea on exertion, near-syncope, palpitations, paroxysmal nocturnal dyspnea and syncope.   Respiratory: Positive for shortness of breath. Negative for cough, hemoptysis and snoring.    Endocrine: Negative for cold intolerance and heat intolerance.   Hematologic/Lymphatic: Negative for bleeding problem. Does not bruise/bleed easily.   Skin: Negative for rash.   Musculoskeletal: Negative for back pain and falls.   Gastrointestinal: Negative for abdominal pain, constipation, diarrhea, heartburn, melena, nausea and vomiting.   Genitourinary: Negative for hematuria.   Neurological: Negative for dizziness, headaches and light-headedness.   Psychiatric/Behavioral: Negative for altered mental status.   Allergic/Immunologic: Negative for persistent infections.        Objective     Vital Sign Min/Max for last 24 hours  Temp  Min: 97.5 °F (36.4 °C)  Max: 98.3 °F (36.8 °C)   BP  Min: 88/60  Max: 103/59   Pulse  Min: 72  Max: 84   Resp  Min: 16  Max: 18   SpO2  Min: 94 %  Max: 98 %      No data recorded   Weight  Min: 99.7 kg (219 lb 12.8 oz)  Max: 99.7 kg (219 lb 12.8 oz)         01/20/20 2035   Weight: 99.7 kg (219 lb 12.8 oz)         Intake/Output Summary (Last 24 hours) at 1/21/2020 0956  Last data filed at 1/21/2020 0753  Gross per 24 hour   Intake --   Output 2275 ml   Net -2275 ml         Physical Exam:  Physical Exam   Constitutional: He is oriented to person, place, and time. He appears well-developed and well-nourished.   HENT:   Head: Normocephalic and atraumatic.   Eyes: Pupils are equal, round, and reactive to light.   Neck: Normal range of motion. Neck supple. No JVD present. Carotid bruit is not present.   Cardiovascular: Normal rate, regular rhythm, normal heart sounds and intact distal pulses.   Pulmonary/Chest: Effort normal. He has decreased breath sounds.   Abdominal: Soft. Bowel sounds are normal.   Musculoskeletal: Normal range of motion. He exhibits edema (Left Greater than Right. Improved).   Neurological: He is alert and oriented to person, place, and time. He has normal reflexes.   Skin: Skin is warm and dry.   Psychiatric: He has a normal mood and affect. His behavior is normal. Judgment and thought content normal.        Results Review:   Lab Results (last 72 hours)     Procedure Component Value Units Date/Time    Comprehensive Metabolic Panel [[id number]]  (Abnormal) Collected:  01/20/20 0413    Specimen:  Blood Updated:  01/20/20 0507     Glucose 98 mg/dL      BUN 63 mg/dL      Creatinine 2.29 mg/dL      Sodium 136 mmol/L      Potassium 4.3 mmol/L      Chloride 102 mmol/L      CO2 25.0 mmol/L      Calcium 8.5 mg/dL      Total Protein 5.7 g/dL      Albumin 3.50 g/dL      ALT (SGPT) 50 U/L      AST (SGOT) 52 U/L      Alkaline Phosphatase 112 U/L      Total Bilirubin 1.0 mg/dL      eGFR Non African Amer 28 mL/min/1.73      Globulin 2.2 gm/dL      A/G Ratio 1.6 g/dL      BUN/Creatinine Ratio 27.5     Anion Gap 9.0 mmol/L     Narrative:       GFR Normal >60  Chronic  Kidney Disease <60  Kidney Failure <15      CBC (No Diff) [[id number]]  (Abnormal) Collected:  01/20/20 0413    Specimen:  Blood Updated:  01/20/20 0451     WBC 8.52 10*3/mm3      RBC 2.97 10*6/mm3      Hemoglobin 9.3 g/dL      Hematocrit 27.6 %      MCV 92.9 fL      MCH 31.3 pg      MCHC 33.7 g/dL      RDW 20.3 %      RDW-SD 68.5 fl      MPV 14.1 fL      Platelets 246 10*3/mm3     Comprehensive Metabolic Panel [[id number]]  (Abnormal) Collected:  01/19/20 1416    Specimen:  Blood Updated:  01/19/20 1507     Glucose 113 mg/dL      BUN 60 mg/dL      Creatinine 2.37 mg/dL      Sodium 134 mmol/L      Potassium 4.5 mmol/L      Chloride 101 mmol/L      CO2 25.0 mmol/L      Calcium 8.3 mg/dL      Total Protein 5.8 g/dL      Albumin 3.70 g/dL      ALT (SGPT) 54 U/L      AST (SGOT) 40 U/L      Alkaline Phosphatase 114 U/L      Total Bilirubin 0.9 mg/dL      eGFR Non African Amer 27 mL/min/1.73      Globulin 2.1 gm/dL      A/G Ratio 1.8 g/dL      BUN/Creatinine Ratio 25.3     Anion Gap 8.0 mmol/L     Narrative:       GFR Normal >60  Chronic Kidney Disease <60  Kidney Failure <15      Extra Tubes [[id number]] Collected:  01/19/20 0340    Specimen:  Blood, Venous Line Updated:  01/19/20 0700    Narrative:       The following orders were created for panel order Extra Tubes.  Procedure                               Abnormality         Status                     ---------                               -----------         ------                     Lavender Top[[id number]]                                     Final result                 Please view results for these tests on the individual orders.    Lavender Top [[id number]] Collected:  01/19/20 0340    Specimen:  Blood Updated:  01/19/20 0700     Extra Tube hold for add-on     Comment: Auto resulted       Troponin [[id number]]  (Abnormal) Collected:  01/19/20 0334    Specimen:  Blood Updated:  01/19/20 0426     Troponin T 0.038 ng/mL     Narrative:       Troponin T Reference  Range:  <= 0.03 ng/mL-   Negative for AMI  >0.03 ng/mL-     Abnormal for myocardial necrosis.  Clinicians would have to utilize clinical acumen, EKG, Troponin and serial changes to determine if it is an Acute Myocardial Infarction or myocardial injury due to an underlying chronic condition.       Results may be falsely decreased if patient taking Biotin.      Troponin [[id number]]  (Normal) Collected:  01/18/20 2017    Specimen:  Blood Updated:  01/18/20 2103     Troponin T 0.030 ng/mL     Narrative:       Troponin T Reference Range:  <= 0.03 ng/mL-   Negative for AMI  >0.03 ng/mL-     Abnormal for myocardial necrosis.  Clinicians would have to utilize clinical acumen, EKG, Troponin and serial changes to determine if it is an Acute Myocardial Infarction or myocardial injury due to an underlying chronic condition.       Results may be falsely decreased if patient taking Biotin.      TSH [[id number]]  (Normal) Collected:  01/18/20 1448    Specimen:  Blood Updated:  01/18/20 1521     TSH 2.990 uIU/mL     Troponin [[id number]]  (Normal) Collected:  01/18/20 1448    Specimen:  Blood Updated:  01/18/20 1520     Troponin T 0.028 ng/mL     Narrative:       Troponin T Reference Range:  <= 0.03 ng/mL-   Negative for AMI  >0.03 ng/mL-     Abnormal for myocardial necrosis.  Clinicians would have to utilize clinical acumen, EKG, Troponin and serial changes to determine if it is an Acute Myocardial Infarction or myocardial injury due to an underlying chronic condition.       Results may be falsely decreased if patient taking Biotin.      Comprehensive Metabolic Panel [[id number]]  (Abnormal) Collected:  01/18/20 1448    Specimen:  Blood Updated:  01/18/20 1518     Glucose 106 mg/dL      BUN 59 mg/dL      Creatinine 2.31 mg/dL      Sodium 139 mmol/L      Potassium 4.5 mmol/L      Chloride 103 mmol/L      CO2 24.0 mmol/L      Calcium 8.9 mg/dL      Total Protein 6.5 g/dL      Albumin 4.10 g/dL      ALT (SGPT) 70 U/L      AST (SGOT) 53  U/L      Alkaline Phosphatase 125 U/L      Total Bilirubin 1.5 mg/dL      eGFR Non African Amer 28 mL/min/1.73      Globulin 2.4 gm/dL      A/G Ratio 1.7 g/dL      BUN/Creatinine Ratio 25.5     Anion Gap 12.0 mmol/L     Narrative:       GFR Normal >60  Chronic Kidney Disease <60  Kidney Failure <15      BNP [[id number]]  (Abnormal) Collected:  01/18/20 1448    Specimen:  Blood Updated:  01/18/20 1518     proBNP 14,929.0 pg/mL     Narrative:       Among patients with dyspnea, NT-proBNP is highly sensitive for the detection of acute congestive heart failure. In addition NT-proBNP of <300 pg/ml effectively rules out acute congestive heart failure with 99% negative predictive value.    Results may be falsely decreased if patient taking Biotin.      Hemoglobin A1c [[id number]]  (Abnormal) Collected:  01/18/20 1448    Specimen:  Blood Updated:  01/18/20 1510     Hemoglobin A1C 6.40 %     Narrative:       Hemoglobin A1C Ranges:    Increased Risk for Diabetes  5.7% to 6.4%  Diabetes                     >= 6.5%  Diabetic Goal                < 7.0%    Protime-INR [[id number]]  (Abnormal) Collected:  01/18/20 1448    Specimen:  Blood Updated:  01/18/20 1505     Protime 15.4 Seconds      INR 1.18    CBC & Differential [[id number]] Collected:  01/18/20 1448    Specimen:  Blood Updated:  01/18/20 1458    Narrative:       The following orders were created for panel order CBC & Differential.  Procedure                               Abnormality         Status                     ---------                               -----------         ------                     CBC Auto Differential[[id number]]        Abnormal            Final result                 Please view results for these tests on the individual orders.    CBC Auto Differential [[id number]]  (Abnormal) Collected:  01/18/20 1448    Specimen:  Blood Updated:  01/18/20 1458     WBC 10.22 10*3/mm3      RBC 3.58 10*6/mm3      Hemoglobin 11.1 g/dL      Hematocrit 33.7 %      MCV 94.1  fL      MCH 31.0 pg      MCHC 32.9 g/dL      RDW 20.6 %      RDW-SD 70.4 fl      MPV 14.0 fL      Platelets 304 10*3/mm3      Neutrophil % 71.1 %      Lymphocyte % 14.2 %      Monocyte % 9.4 %      Eosinophil % 4.0 %      Basophil % 0.7 %      Neutrophils, Absolute 7.27 10*3/mm3      Lymphocytes, Absolute 1.45 10*3/mm3      Monocytes, Absolute 0.96 10*3/mm3      Eosinophils, Absolute 0.41 10*3/mm3      Basophils, Absolute 0.07 10*3/mm3               Echo EF Estimated  Lab Results   Component Value Date    ECHOEFEST 20 01/18/2020         Cath Ejection Fraction Quantitative  No results found for: CATHEF        Medication Review: yes  Current Facility-Administered Medications   Medication Dose Route Frequency Provider Last Rate Last Dose   • allopurinol (ZYLOPRIM) tablet 100 mg  100 mg Oral BID North Gregg MD   100 mg at 01/21/20 0847   • aspirin EC tablet 81 mg  81 mg Oral Daily North Gregg MD   81 mg at 01/21/20 0847   • atorvastatin (LIPITOR) tablet 40 mg  40 mg Oral Daily North Gregg MD   40 mg at 01/21/20 0847   • carvedilol (COREG) tablet 25 mg  25 mg Oral BID With Meals North Gregg MD   25 mg at 01/20/20 1744   • enoxaparin (LOVENOX) syringe 40 mg  40 mg Subcutaneous Q24H North Gregg MD   40 mg at 01/20/20 1614   • famotidine (PEPCID) tablet 40 mg  40 mg Oral Daily North Gregg MD   40 mg at 01/21/20 0847   • furosemide (LASIX) injection 40 mg  40 mg Intravenous BID Sandy Coreas APRN   40 mg at 01/21/20 0847   • hydrALAZINE (APRESOLINE) tablet 10 mg  10 mg Oral Q8H [place] Subramanian MD   10 mg at 01/21/20 0619   • HYDROcodone-acetaminophen (NORCO) 7.5-325 MG per tablet 1 tablet  1 tablet Oral Q6H PRN North Gregg MD   1 tablet at 01/21/20 0311   • isosorbide mononitrate (IMDUR) 24 hr tablet 30 mg  30 mg Oral Q24H Black Chua DO   30 mg at 01/20/20 1256   • pantoprazole (PROTONIX) EC tablet 40 mg  40 mg Oral BID North Gregg MD   40 mg at  01/21/20 0847   • sacubitril-valsartan (ENTRESTO) 24-26 MG tablet 1 tablet  1 tablet Oral Q12H North Gregg MD   1 tablet at 01/21/20 0847   • sodium chloride 0.9 % flush 10 mL  10 mL Intravenous Q12H North Gregg MD   10 mL at 01/21/20 0853   • sodium chloride 0.9 % flush 10 mL  10 mL Intravenous PRN North Gregg MD             Assessment/Plan       Acute on chronic systolic heart failure (CMS/HCC)    PVD (peripheral vascular disease) (CMS/HCC)    Coronary artery disease involving native coronary artery of native heart without angina pectoris    S/P implantation of automatic cardioverter/defibrillator (AICD)    HLD (hyperlipidemia)    Ischemic cardiomyopathy    HTN (hypertension)    CKD (chronic kidney disease)    COPD, group B, by GOLD 2017 classification (CMS/HCC)    Severe pulmonary hypertension (CMS/HCC)    Non-sustained ventricular tachycardia (CMS/HCC)    Plan:  Continue Coreg, Entresto. Likely can switch to PO Lasix soon. Compress legs. Daily weights and strict I&O. Suspect volume overload is due to numerous blood products patient received over last month. Will not continue Imdur and Hydralazine after discussion with Dr. Mckeon.   CAD- no angina at this time- recent cath at [facility] last month. On Aspirin. Brilinta stopped due to GI bleed. History of plavix resistance  CKD- appear near baseline  In terms of upgrade to device- he follows with Dr. Cantu- he believes he has an appointment in March- will need to varify this    Further recommendations per OLI Hollingsworth  01/21/20  9:56 AM                  Electronically signed by John Mckeon MD at 01/21/20 1128     Sandy Coreas APRN at 01/20/20 1356     Attestation signed by Black Chua DO at 01/20/20 9923    I personally evaluated and examined the patient in conjunction with OLI English and agree with the assessment, treatment plan, and disposition of the patient as recorded by her. My history, exam, and  further recommendations are:     The patient's breathing is improved.  He was actually able to lie flat in his bed last night without orthopnea.  His appetite is good.  Cardiology has seen, evaluated and made treatment recommendations for the patient.  Hopefully the patient can transition to oral diuretics tomorrow.  Cardiology feels that the patient will require at a ventricular AICD placement.  He has an appointment with electrophysiology in March.    Black GILBERTEma Chua,   01/20/20  6:21 PM                          [facility] Medicine Services  INPATIENT PROGRESS NOTE    Length of Stay: 2  Date of Admission: 1/18/2020  Primary Care Physician: Alli Perry MD    Subjective   Chief Complaint: Follow-up  HPI   Sitting up on side of bed with brother at bedside.  He reports feeling much improved today.  He was able to lie flat throughout the night without getting short of breath.  He has been ambulating in the halls today.  He denies chest pain or palpitations.  He has had some dizziness at times.  He denies abdominal pain, nausea, or vomiting.  He is eating and drinking well.    Review of Systems   All pertinent negatives and positives are as above. All other systems have been reviewed and are negative unless otherwise stated.     Objective    Temp:  [97 °F (36.1 °C)-98.3 °F (36.8 °C)] 98.3 °F (36.8 °C)  Heart Rate:  [] 81  Resp:  [18-20] 18  BP: ()/(52-63) 94/63  Physical Exam  Constitutional: He is oriented to person, place, and time. He appears well-developed and well-nourished. No distress.   HENT:   Head: Normocephalic and atraumatic.   Neck: Normal range of motion. Neck supple. No JVD present. No tracheal deviation present.   Cardiovascular: Normal rate, regular rhythm and intact distal pulses. Exam reveals no gallop and no friction rub.   Sinus 74-78 with 14 beats of V. tach and frequent PVCs overnight   Pulmonary/Chest: Effort normal. He has no wheezes. He  has no rales.   Abdominal: Soft. Bowel sounds are normal. He exhibits no distension. There is no tenderness. There is no guarding.   Musculoskeletal: He exhibits edema (BLE mild 1+ edema- now wrapped with ACE wrap). He exhibits no tenderness.   Neurological: He is alert and oriented to person, place, and time. No cranial nerve deficit.   Skin: Skin is warm and dry. No rash noted. No erythema.   Psychiatric: He has a normal mood and affect. His behavior is normal. Judgment and thought content normal.   Vitals reviewed.    Results Review:  I have reviewed the labs, radiology results, and diagnostic studies.    Laboratory Data:   Results from last 7 days   Lab Units 01/20/20  0413 01/18/20  1448   WBC 10*3/mm3 8.52 10.22   HEMOGLOBIN g/dL 9.3* 11.1*   HEMATOCRIT % 27.6* 33.7*   PLATELETS 10*3/mm3 246 304     Results from last 7 days   Lab Units 01/20/20  0413 01/19/20  1416 01/18/20  1448   SODIUM mmol/L 136 134* 139   POTASSIUM mmol/L 4.3 4.5 4.5   CHLORIDE mmol/L 102 101 103   CO2 mmol/L 25.0 25.0 24.0   BUN mg/dL 63* 60* 59*   CREATININE mg/dL 2.29* 2.37* 2.31*   CALCIUM mg/dL 8.5* 8.3* 8.9   BILIRUBIN mg/dL 1.0 0.9 1.5*   ALK PHOS U/L 112 114 125*   ALT (SGPT) U/L 50* 54* 70*   AST (SGOT) U/L 52* 40 53*   GLUCOSE mg/dL 98 113* 106*     I have reviewed the patient current medications.     Assessment/Plan     Active Hospital Problems    Diagnosis   • **Acute on chronic systolic heart failure (CMS/HCC)   • Severe pulmonary hypertension (CMS/HCC)   • Non-sustained ventricular tachycardia (CMS/HCC)   • COPD, group B, by GOLD 2017 classification (CMS/HCC)   • S/P implantation of automatic cardioverter/defibrillator (AICD)   • PVD (peripheral vascular disease) (CMS/HCC)   • Ischemic cardiomyopathy   • Coronary artery disease involving native coronary artery of native heart without angina pectoris     3.0 x28 mm Xience MELISA to the proximal LAD in 12/2018     • CKD (chronic kidney disease)   • HTN (hypertension)   • HLD  (hyperlipidemia)     Plan:  1.  Appreciate cardiology assistance.  Will continue IV Lasix today, and likely plan to transition to oral therapy tomorrow.  Continue strict intake and output, daily weights, cardiac diet.  Renal function felt to be at or near baseline.  Electrolytes stable.  Ace wraps added for compression, encouraged elevation as well.  2.  Recent heart cath from 12/16/2019 showed nonobstructive CAD of major vessels, an acute marginal branch of RCA with smaller caliber with ostial disease.  Medical management was recommended.  Continue aspirin, Lipitor, Coreg, and Entresto.  Hydralazine and Imdur added over the weekend.  Patient has had soft blood pressure readings, but overall stable.  3.  Hemoglobin 11.1 on 1/18 and 9.3 today.  No signs or symptoms of active bleeding.  Brilinta recently stopped due to GI bleed.  4.  Patient needs to upgrade to biventricular AICD.  He does have an appointment with Dr. Cantu in [place] in March.  5.  Labs in a.m.    Discharge Planning: I expect the patient to be discharged to home likely tomorrow.    OLI Small   01/20/20   1:59 PM      Electronically signed by Black Chua DO at 01/20/20 1822     Sam Bowie APRN at 01/20/20 1056     Attestation signed by John Mckeon MD at 01/20/20 1352    I have personally reviewed EKG and telemetry which reveals SR with a LBBB    Heart- RRR  Lungs- Clear  Ext-1-2 + edema    New problems for this admission  1. Biventricular failure, exacerbated by recent GI bleed requiring transfusion. Will tentatively plan upgrade to a Bi V device in the near future. Continue diuresis    Medical Decision Making: High Complexity    I have seen and examined the patient and agree with the findings below                     [facility] -  Progress Note     LOS: 2 days   Patient Care Team:  Alli Perry MD as PCP - General  Alli Perry MD as PCP - Family Medicine  John Mckeon MD as  Cardiologist (Cardiology)  Katie Parra APRN as Nurse Practitioner (Pulmonary Disease)  John Cantu MD as Cardiologist (Cardiac Electrophysiology)    Chief Complaint: Leg Swelling     Subjective     Interval History:   Patient with extensive heart history. Patient with known coronary artery disease with drug eluting stent to proximal LAD in December 2018 and history of stent to to LAD in 2011. History of Plavix resistance and GI bleeding with ulcer on Brilinta. Now on Aspirin only. Last cath done in December at outlying facility in December of 2019 (last month) with patent stents with with acute marginal branch of small caliber RCA with ostial disease- medical management was recommended at that time. Known ischemic cardiomyopathy since 2011- EF 24% at that time. Last echo in December 2018 with EF 31-35%. Echo noted at time of cath with EF 20%. Echo repeated here 1/18/20 with EF 20% and severe pulmonary hypertension, diastolic dysfunction, LA moderately dilated, mild MR, moderate TR. Patient with chronic kidney disease- appears near baseline. 2 recent admissions to [facility] for GI Bleeding- has received a significant amount of blood products over that time. Patient had Brilinta stopped. During most recent admission to [facility] BNP was 28538 on 1/13- treated with IV Diuretics. Had Lisinopril stopped and then started on Entresto. BNP on this ER visit to [facility] was [phone]. Patient's chief complaint was leg swelling- some shortness of breath on exertion and orthopnea. Patient has now been treated with IV diuretics with good response and feels much better. Can now lie flat. Breathing much improved. Up and ambulatory in the hallway. States his PCP had increased Lasix to 80 mg prior to most recent admission to [facility]. Leg swelling better but persists. Renal function stable - slight improvement since admission today.     Review of Systems:   Review of Systems      Constitution: Negative for chills, decreased appetite, fever, malaise/fatigue, weight gain and weight loss.   HENT: Negative for nosebleeds.    Eyes: Negative for visual disturbance.   Cardiovascular: Positive for leg swelling and orthopnea (much improved). Negative for chest pain, dyspnea on exertion, near-syncope, palpitations, paroxysmal nocturnal dyspnea and syncope.   Respiratory: Positive for shortness of breath. Negative for cough, hemoptysis and snoring.    Endocrine: Negative for cold intolerance and heat intolerance.   Hematologic/Lymphatic: Negative for bleeding problem. Does not bruise/bleed easily.   Skin: Negative for rash.   Musculoskeletal: Negative for back pain and falls.   Gastrointestinal: Negative for abdominal pain, constipation, diarrhea, heartburn, melena, nausea and vomiting.   Genitourinary: Negative for hematuria.   Neurological: Negative for dizziness, headaches and light-headedness.   Psychiatric/Behavioral: Negative for altered mental status.   Allergic/Immunologic: Negative for persistent infections.        Objective     Vital Sign Min/Max for last 24 hours  Temp  Min: 97 °F (36.1 °C)  Max: 98.2 °F (36.8 °C)   BP  Min: 90/52  Max: 107/57   Pulse  Min: 73  Max: 101   Resp  Min: 18  Max: 20   SpO2  Min: 95 %  Max: 98 %   No data recorded   Weight  Min: 100 kg (220 lb 9.6 oz)  Max: 100 kg (220 lb 9.6 oz)         01/19/20  1946   Weight: 100 kg (220 lb 9.6 oz)         Intake/Output Summary (Last 24 hours) at 1/20/2020 1056  Last data filed at 1/20/2020 0955  Gross per 24 hour   Intake 1340 ml   Output 2200 ml   Net -860 ml         Physical Exam:  Physical Exam   Constitutional: He is oriented to person, place, and time. He appears well-developed and well-nourished.   HENT:   Head: Normocephalic and atraumatic.   Eyes: Pupils are equal, round, and reactive to light.   Neck: Normal range of motion. Neck supple. No JVD present. Carotid bruit is not present.   Cardiovascular: Normal rate,  regular rhythm, normal heart sounds and intact distal pulses.   Pulmonary/Chest: Effort normal. He has decreased breath sounds.   Abdominal: Soft. Bowel sounds are normal.   Musculoskeletal: Normal range of motion. He exhibits edema (1-2+ pitting Left >Right ).   Neurological: He is alert and oriented to person, place, and time. He has normal reflexes.   Skin: Skin is warm and dry.   Psychiatric: He has a normal mood and affect. His behavior is normal. Judgment and thought content normal.        Results Review:   Lab Results (last 72 hours)     Procedure Component Value Units Date/Time    Comprehensive Metabolic Panel [[id number]]  (Abnormal) Collected:  01/20/20 0413    Specimen:  Blood Updated:  01/20/20 0507     Glucose 98 mg/dL      BUN 63 mg/dL      Creatinine 2.29 mg/dL      Sodium 136 mmol/L      Potassium 4.3 mmol/L      Chloride 102 mmol/L      CO2 25.0 mmol/L      Calcium 8.5 mg/dL      Total Protein 5.7 g/dL      Albumin 3.50 g/dL      ALT (SGPT) 50 U/L      AST (SGOT) 52 U/L      Alkaline Phosphatase 112 U/L      Total Bilirubin 1.0 mg/dL      eGFR Non African Amer 28 mL/min/1.73      Globulin 2.2 gm/dL      A/G Ratio 1.6 g/dL      BUN/Creatinine Ratio 27.5     Anion Gap 9.0 mmol/L     Narrative:       GFR Normal >60  Chronic Kidney Disease <60  Kidney Failure <15      CBC (No Diff) [[id number]]  (Abnormal) Collected:  01/20/20 0413    Specimen:  Blood Updated:  01/20/20 0451     WBC 8.52 10*3/mm3      RBC 2.97 10*6/mm3      Hemoglobin 9.3 g/dL      Hematocrit 27.6 %      MCV 92.9 fL      MCH 31.3 pg      MCHC 33.7 g/dL      RDW 20.3 %      RDW-SD 68.5 fl      MPV 14.1 fL      Platelets 246 10*3/mm3     Comprehensive Metabolic Panel [[id number]]  (Abnormal) Collected:  01/19/20 1416    Specimen:  Blood Updated:  01/19/20 1507     Glucose 113 mg/dL      BUN 60 mg/dL      Creatinine 2.37 mg/dL      Sodium 134 mmol/L      Potassium 4.5 mmol/L      Chloride 101 mmol/L      CO2 25.0 mmol/L      Calcium 8.3 mg/dL       Total Protein 5.8 g/dL      Albumin 3.70 g/dL      ALT (SGPT) 54 U/L      AST (SGOT) 40 U/L      Alkaline Phosphatase 114 U/L      Total Bilirubin 0.9 mg/dL      eGFR Non African Amer 27 mL/min/1.73      Globulin 2.1 gm/dL      A/G Ratio 1.8 g/dL      BUN/Creatinine Ratio 25.3     Anion Gap 8.0 mmol/L     Narrative:       GFR Normal >60  Chronic Kidney Disease <60  Kidney Failure <15      Extra Tubes [[id number]] Collected:  01/19/20 0340    Specimen:  Blood, Venous Line Updated:  01/19/20 0700    Narrative:       The following orders were created for panel order Extra Tubes.  Procedure                               Abnormality         Status                     ---------                               -----------         ------                     Lavender Top[[id number]]                                     Final result                 Please view results for these tests on the individual orders.    Lavender Top [[id number]] Collected:  01/19/20 0340    Specimen:  Blood Updated:  01/19/20 0700     Extra Tube hold for add-on     Comment: Auto resulted       Troponin [[id number]]  (Abnormal) Collected:  01/19/20 0334    Specimen:  Blood Updated:  01/19/20 0426     Troponin T 0.038 ng/mL     Narrative:       Troponin T Reference Range:  <= 0.03 ng/mL-   Negative for AMI  >0.03 ng/mL-     Abnormal for myocardial necrosis.  Clinicians would have to utilize clinical acumen, EKG, Troponin and serial changes to determine if it is an Acute Myocardial Infarction or myocardial injury due to an underlying chronic condition.       Results may be falsely decreased if patient taking Biotin.      Troponin [[id number]]  (Normal) Collected:  01/18/20 2017    Specimen:  Blood Updated:  01/18/20 2103     Troponin T 0.030 ng/mL     Narrative:       Troponin T Reference Range:  <= 0.03 ng/mL-   Negative for AMI  >0.03 ng/mL-     Abnormal for myocardial necrosis.  Clinicians would have to utilize clinical acumen, EKG, Troponin and serial  changes to determine if it is an Acute Myocardial Infarction or myocardial injury due to an underlying chronic condition.       Results may be falsely decreased if patient taking Biotin.      TSH [[id number]]  (Normal) Collected:  01/18/20 1448    Specimen:  Blood Updated:  01/18/20 1521     TSH 2.990 uIU/mL     Troponin [[id number]]  (Normal) Collected:  01/18/20 1448    Specimen:  Blood Updated:  01/18/20 1520     Troponin T 0.028 ng/mL     Narrative:       Troponin T Reference Range:  <= 0.03 ng/mL-   Negative for AMI  >0.03 ng/mL-     Abnormal for myocardial necrosis.  Clinicians would have to utilize clinical acumen, EKG, Troponin and serial changes to determine if it is an Acute Myocardial Infarction or myocardial injury due to an underlying chronic condition.       Results may be falsely decreased if patient taking Biotin.      Comprehensive Metabolic Panel [[id number]]  (Abnormal) Collected:  01/18/20 1448    Specimen:  Blood Updated:  01/18/20 1518     Glucose 106 mg/dL      BUN 59 mg/dL      Creatinine 2.31 mg/dL      Sodium 139 mmol/L      Potassium 4.5 mmol/L      Chloride 103 mmol/L      CO2 24.0 mmol/L      Calcium 8.9 mg/dL      Total Protein 6.5 g/dL      Albumin 4.10 g/dL      ALT (SGPT) 70 U/L      AST (SGOT) 53 U/L      Alkaline Phosphatase 125 U/L      Total Bilirubin 1.5 mg/dL      eGFR Non African Amer 28 mL/min/1.73      Globulin 2.4 gm/dL      A/G Ratio 1.7 g/dL      BUN/Creatinine Ratio 25.5     Anion Gap 12.0 mmol/L     Narrative:       GFR Normal >60  Chronic Kidney Disease <60  Kidney Failure <15      BNP [[id number]]  (Abnormal) Collected:  01/18/20 1448    Specimen:  Blood Updated:  01/18/20 1518     proBNP 14,929.0 pg/mL     Narrative:       Among patients with dyspnea, NT-proBNP is highly sensitive for the detection of acute congestive heart failure. In addition NT-proBNP of <300 pg/ml effectively rules out acute congestive heart failure with 99% negative predictive value.    Results may  be falsely decreased if patient taking Biotin.      Hemoglobin A1c [[id number]]  (Abnormal) Collected:  01/18/20 1448    Specimen:  Blood Updated:  01/18/20 1510     Hemoglobin A1C 6.40 %     Narrative:       Hemoglobin A1C Ranges:    Increased Risk for Diabetes  5.7% to 6.4%  Diabetes                     >= 6.5%  Diabetic Goal                < 7.0%    Protime-INR [[id number]]  (Abnormal) Collected:  01/18/20 1448    Specimen:  Blood Updated:  01/18/20 1505     Protime 15.4 Seconds      INR 1.18    CBC & Differential [[id number]] Collected:  01/18/20 1448    Specimen:  Blood Updated:  01/18/20 1458    Narrative:       The following orders were created for panel order CBC & Differential.  Procedure                               Abnormality         Status                     ---------                               -----------         ------                     CBC Auto Differential[[id number]]        Abnormal            Final result                 Please view results for these tests on the individual orders.    CBC Auto Differential [[id number]]  (Abnormal) Collected:  01/18/20 1448    Specimen:  Blood Updated:  01/18/20 1458     WBC 10.22 10*3/mm3      RBC 3.58 10*6/mm3      Hemoglobin 11.1 g/dL      Hematocrit 33.7 %      MCV 94.1 fL      MCH 31.0 pg      MCHC 32.9 g/dL      RDW 20.6 %      RDW-SD 70.4 fl      MPV 14.0 fL      Platelets 304 10*3/mm3      Neutrophil % 71.1 %      Lymphocyte % 14.2 %      Monocyte % 9.4 %      Eosinophil % 4.0 %      Basophil % 0.7 %      Neutrophils, Absolute 7.27 10*3/mm3      Lymphocytes, Absolute 1.45 10*3/mm3      Monocytes, Absolute 0.96 10*3/mm3      Eosinophils, Absolute 0.41 10*3/mm3      Basophils, Absolute 0.07 10*3/mm3               Echo EF Estimated  Lab Results   Component Value Date    ECHOEFEST 20 01/18/2020         Cath Ejection Fraction Quantitative  No results found for: CATHEF        Medication Review: yes  Current Facility-Administered Medications   Medication Dose  Route Frequency Provider Last Rate Last Dose   • allopurinol (ZYLOPRIM) tablet 100 mg  100 mg Oral BID North Gregg MD   100 mg at 01/20/20 1027   • aspirin EC tablet 81 mg  81 mg Oral Daily North Gregg MD   81 mg at 01/20/20 0827   • atorvastatin (LIPITOR) tablet 40 mg  40 mg Oral Daily North Gregg MD   40 mg at 01/20/20 0827   • carvedilol (COREG) tablet 25 mg  25 mg Oral BID With Meals North Gregg MD   25 mg at 01/20/20 1027   • enoxaparin (LOVENOX) syringe 40 mg  40 mg Subcutaneous Q24H North Gregg MD   40 mg at 01/19/20 1511   • famotidine (PEPCID) tablet 40 mg  40 mg Oral Daily North Gregg MD   40 mg at 01/20/20 0827   • furosemide (LASIX) injection 40 mg  40 mg Intravenous BID Sandy Coreas APRN   40 mg at 01/20/20 0827   • hydrALAZINE (APRESOLINE) tablet 10 mg  10 mg Oral Q8H Washington Subramanian MD   10 mg at 01/20/20 0519   • HYDROcodone-acetaminophen (NORCO) 7.5-325 MG per tablet 1 tablet  1 tablet Oral Q6H PRN North Gregg MD   1 tablet at 01/20/20 0135   • isosorbide mononitrate (IMDUR) 24 hr tablet 30 mg  30 mg Oral Q24H Black Chua,        • pantoprazole (PROTONIX) EC tablet 40 mg  40 mg Oral BID North Gregg MD   40 mg at 01/20/20 0827   • sacubitril-valsartan (ENTRESTO) 24-26 MG tablet 1 tablet  1 tablet Oral Q12H North Gregg MD   1 tablet at 01/20/20 1027   • sodium chloride 0.9 % flush 10 mL  10 mL Intravenous Q12H North Gregg MD   10 mL at 01/20/20 0828   • sodium chloride 0.9 % flush 10 mL  10 mL Intravenous PRN North Gregg MD             Assessment/Plan       Acute on chronic systolic heart failure (CMS/HCC)    PVD (peripheral vascular disease) (CMS/HCC)    Coronary artery disease involving native coronary artery of native heart without angina pectoris    S/P implantation of automatic cardioverter/defibrillator (AICD)    HLD (hyperlipidemia)    Ischemic cardiomyopathy    HTN (hypertension)    CKD  (chronic kidney disease)    COPD, group B, by GOLD 2017 classification (CMS/HCC)    Severe pulmonary hypertension (CMS/HCC)    Non-sustained ventricular tachycardia (CMS/HCC)    Plan:  Continue Coreg, Entresto. Likely can switch to PO Lasix soon. Compress legs. Daily weights and strict I&O. Suspect volume overload is due to numerous blood products patient received over last month. Started on Isordil and Hydralazine on admission- will defer to Dr. Mckeon to continue these or not.   Severe pulmonary hypertension. Recent worsening.  CAD- no angina a this time- recent cath at [facility] last month. On Aspirin. Brilinta stopped due to GI bleed. History of plavix resistance  CKD- appear near baseline  In terms of upgrade to device- he follows with Dr. Cantu- he believes he has an appointment in March- will need to varify this    Sam BowieOLI  01/20/20  10:56 AM                  Electronically signed by John Mckeon MD at 01/20/20 5402     North Gregg MD at 01/19/20 9894              [facility] Medicine Services  INPATIENT PROGRESS NOTE    Length of Stay: 1  Date of Admission: 1/18/2020  Primary Care Physician: Alli Perry MD    Subjective   Chief Complaint: Follow-up shortness of breath  HPI   Patient resting in bed.  He states he is feeling a little better today, but continues to have shortness of breath with exertion.  He walked down the hallway this morning to get himself a cup of coffee and states he was very winded when he got back to his room.  He denies chest pain.  He has an occasional, non-productive cough.  He is eating and drinking well.  He states he normally does not have any swelling in his lower extremities.    Review of Systems   All pertinent negatives and positives are as above. All other systems have been reviewed and are negative unless otherwise stated.     Objective    Temp:  [97.3 °F (36.3 °C)-98.3 °F (36.8 °C)] 97.3 °F (36.3 °C)  Heart Rate:  [73-86]  73  Resp:  [18-20] 18  BP: ()/(52-58) 98/58  Physical Exam   Constitutional: He is oriented to person, place, and time. He appears well-developed and well-nourished. No distress.   HENT:   Head: Normocephalic and atraumatic.   Neck: Normal range of motion. Neck supple. No JVD present. No tracheal deviation present.   Cardiovascular: Normal rate, regular rhythm and intact distal pulses. Exam reveals no gallop and no friction rub.   Sinus 75-96   Pulmonary/Chest: Effort normal. He has no wheezes. He has rales (Fine bibasilar rales).   Abdominal: Soft. Bowel sounds are normal. He exhibits no distension. There is no tenderness. There is no guarding.   Musculoskeletal: He exhibits edema (BLE mild 1+ edema). He exhibits no tenderness.   Neurological: He is alert and oriented to person, place, and time. No cranial nerve deficit.   Skin: Skin is warm and dry. No rash noted. No erythema.   Psychiatric: He has a normal mood and affect. His behavior is normal. Judgment and thought content normal.   Vitals reviewed.    Results Review:  I have reviewed the labs, radiology results, and diagnostic studies.    Laboratory Data:   Results from last 7 days   Lab Units 01/18/20  1448   WBC 10*3/mm3 10.22   HEMOGLOBIN g/dL 11.1*   HEMATOCRIT % 33.7*   PLATELETS 10*3/mm3 304     Results from last 7 days   Lab Units 01/19/20  1416 01/18/20  1448   SODIUM mmol/L 134* 139   POTASSIUM mmol/L 4.5 4.5   CHLORIDE mmol/L 101 103   CO2 mmol/L 25.0 24.0   BUN mg/dL 60* 59*   CREATININE mg/dL 2.37* 2.31*   CALCIUM mg/dL 8.3* 8.9   BILIRUBIN mg/dL 0.9 1.5*   ALK PHOS U/L 114 125*   ALT (SGPT) U/L 54* 70*   AST (SGOT) U/L 40 53*   GLUCOSE mg/dL 113* 106*     Radiology Data:   Imaging Results (Last 24 Hours)     Procedure Component Value Units Date/Time    XR Chest PA & Lateral [[id number]] Collected:  01/18/20 1650     Updated:  01/18/20 1654    Narrative:       XR CHEST PA AND LATERAL- 1/18/2020 4:29 PM CST     HISTORY: shortness of breath         COMPARISON: Chest exam dated 07/05/2018.     FINDINGS:   Upright frontal and lateral radiographs of the chest were obtained.     No lung consolidation. No pleural effusion or pneumothorax.  Hyperexpanded lung volumes. Left chest wall AICD. Underlying  cardiomegaly which is stable. Mild central pulmonary congestion without  interstitial or johanna edema. The osseous structures and surrounding soft  tissues demonstrate no acute abnormality.       Impression:       1. Underlying cardiomegaly which is stable. There is a mild central  pulmonary vascular congestion which is also stable. No visualized  infiltrate or new pulmonary edema.     This report was finalized on 01/18/2020 16:51 by Dr Owen Causey, .        Results for orders placed during the hospital encounter of 01/18/20   Adult Transthoracic Echo Complete With Contrast if Necessary Per Protocol    Narrative · The left ventricular cavity is mildly dilated.  · Estimated EF = 20%.  · Left ventricular diastolic dysfunction.  · Left ventricular systolic function is severely decreased.  · Left atrial cavity size is moderately dilated.  · Mild mitral valve regurgitation is present  · Moderate tricuspid valve regurgitation is present.  · Severe pulmonary hypertension is present.        I have reviewed the patient current medications.     Assessment/Plan     Active Hospital Problems    Diagnosis   • **Acute on chronic systolic heart failure (CMS/HCC)   • Severe pulmonary hypertension (CMS/HCC)   • Non-sustained ventricular tachycardia (CMS/HCC)   • COPD, group B, by GOLD 2017 classification (CMS/HCC)   • S/P implantation of automatic cardioverter/defibrillator (AICD)   • PVD (peripheral vascular disease) (CMS/HCC)   • Ischemic cardiomyopathy   • Coronary artery disease involving native coronary artery of native heart without angina pectoris     3.0 x28 mm Xience MELISA to the proximal LAD in 12/2018     • CKD (chronic kidney disease)   • HTN (hypertension)   • HLD  (hyperlipidemia)     Plan:  1.  Patient responded well to Lasix yesterday.  Renal function and electrolytes stable.  Will continue Lasix 40 mg IV BID.  Continue daily weights, strict intake and output, and cardiac diet.  2.  Appreciate cardiology assistance.  Transthoracic echocardiogram reviewed as above.  EF now 20%, previously noted to be 31-35% in December 2018.  Now demonstrating severe pulmonary hypertension as well.  Patient needs to upgrade to Biventricular AICD, will need to discuss with Dr. Mckeon tomorrow.  3.  Continue Aspirin, Lipitor, Coreg, and Entresto.  Hydralazine and Imdur added as per Dr. Subramanian today.  Currently holding Brilinta with recent GI bleed.  Lovenox at prophylactic dosing, monitor closely for any signs of bleeding.  4.  Activity as tolerated  5.  Labs in AM    Discharge Planning: I expect the patient to be discharged to home in 1-2 days.    OLI Small   01/19/20   3:39 PM    I personally evaluated and examined the patient in conjunction with OLI Barrera and agree with the assessment, treatment plan, and disposition of the patient as recorded by her. My history, exam, and further recommendations are:     Patient seen and examined at bedside.  May need upgrade of device per cardiology.  Sanjive.    North Gregg MD  01/19/20  8:02 PM      Electronically signed by North Gregg MD at 01/19/20 2002          Consult Notes (last 72 hours) (Notes from 01/19/20 0919 through 01/22/20 0919)      Washington Subramanian MD at 01/19/20 0956      Consult Orders    1. Inpatient Cardiology Consult [[id number]] ordered by North Gregg MD at 01/18/20 9898                   LOS: 1 day   Patient Care Team:  Alli Perry MD as PCP - General  Alli Perry MD as PCP - Family Medicine  Jhon Mckeon MD as Cardiologist (Cardiology)  Katie Parra APRN as Nurse Practitioner (Pulmonary Disease)  John Cantu MD as Cardiologist (Cardiac Electrophysiology)    Chief  Complaint:   Acute on chronic systolic heart failure (CMS/HCC)    PVD (peripheral vascular disease) (CMS/HCC)    Coronary artery disease involving native coronary artery of native heart without angina pectoris    S/P implantation of automatic cardioverter/defibrillator (AICD)    HLD (hyperlipidemia)    Ischemic cardiomyopathy    HTN (hypertension)    CKD (chronic kidney disease)    COPD, group B, by GOLD 2017 classification (CMS/HCC)    Severe pulmonary hypertension (CMS/HCC)    Non-sustained ventricular tachycardia (CMS/HCC)         Subjective    Jadon Flynn is a 69 y.o. malewho is being seen in consultation.  Patient presents with shortness of breath  He has not been feeling well  Was admitted to hospital in [place] around [place].  He was discharged home now presented with increasing shortness of breath weakness fatigue orthopnea and bipedal edema  Since admission he has improved substantially  Results of echocardiogram as below  He sees Dr. Mckeon  He has chronically depressed left ventricular ejection fraction with interventricular conduction defect on EKG  Latest EKG shows sinus rhythm with IVCD and premature ventricular contractions  His leg swelling has improved markedly  No palpitation  No presyncope syncope  Denies any chest pain  Oral intake is fair  Satisfactory bowel and bladder activity  Ambulating more  Hemodynamically stable  Chronic kidney disease  Troponin elevated with a flat trajectory not consistent with acute coronary syndrome as below    Component      Latest Ref Rng & Units 1/18/2020 1/18/2020 1/19/2020           2:48 PM  8:17 PM    Troponin T      0.000 - 0.030 ng/mL 0.028 0.030 0.038 ()     Telemetry: no malignant arrhythmia. No significant pauses.    Review of Systems     Constitutional: No chills   Has fatigue   No fever.     HENT: Negative.    Eyes: Negative.      Respiratory: Negative for cough,   No chest wall soreness,   Shortness of breath,   no wheezing, no stridor.           Cardiovascular: As above    Gastrointestinal: Negative for abdominal distention,  No abdominal pain,   No blood in stool,   No constipation,   No diarrhea,   No nausea   No vomiting.     Endocrine: Negative.    Genitourinary: Negative for difficulty urinating, dysuria, flank pain and hematuria.     Musculoskeletal: Negative.    Skin: Negative for rash and wound.   Allergic/Immunologic: Negative.      Neurological: Negative for dizziness, syncope, weakness,   No light-headedness  No  headaches.     Hematological: Does not bruise/bleed easily.     Psychiatric/Behavioral: Negative for agitation or behavioral problems,   No confusion,   the patient is  nervous/anxious.       History:   Past Medical History:   Diagnosis Date   • Asymptomatic bilateral carotid artery stenosis    • CAD (coronary artery disease) 12/07/2016   • CAD in native artery      2009 stents   • Carotid artery disease (CMS/HCC)    • CHF (congestive heart failure) (CMS/HCC) 12/07/2016   • Chronic combined systolic and diastolic CHF (congestive heart failure) (CMS/HCC)     echo 5/2013 EF 35-40%   • Chronic systolic CHF (congestive heart failure), NYHA class 2 (CMS/HCC)    • CKD (chronic kidney disease) 12/07/2016   • CKD (chronic kidney disease), stage III (CMS/HCC)    • COPD, group B, by GOLD 2017 classification (CMS/HCC) 11/20/2018   • Essential hypertension    • Gout    • HLD (hyperlipidemia) 12/07/2016   • HTN (hypertension) 12/07/2016   • Hyperkalemia    • Hyperlipidemia, unspecified    • Ischemic heart disease 12/07/2016   • Ischemic heart disease    • NSTEMI (non-ST elevated myocardial infarction) (CMS/[facility]) 12/28/2018   • Peripheral vascular disease (CMS/[facility])    • Pinched nerve in neck    • PVD (peripheral vascular disease) (CMS/[facility]) 12/07/2016   • S/P implantation of automatic cardioverter/defibrillator (AICD) 12/07/2016   • S/P implantation of automatic cardioverter/defibrillator (AICD)    • Stroke (CMS/[facility])      Past Surgical History:    Procedure Laterality Date   • CARDIAC CATHETERIZATION N/A 12/28/2018    Procedure: Coronary angiography;  Surgeon: John Mckeon MD;  Location: [facility] CATH INVASIVE LOCATION;  Service: Cardiology   • CARDIAC DEFIBRILLATOR PLACEMENT     • CAROTID ENDARTERECTOMY     • CARPAL TUNNEL RELEASE Right    • CORONARY STENT PLACEMENT      x2   • FINGER SURGERY Right     tendon repair    • HYDROCELE EXCISION / REPAIR     • ILIAC ARTERY STENT     • INSERT / REPLACE / REMOVE PACEMAKER  2011   • ARLIN PROCEDURE     • TOTAL KNEE ARTHROPLASTY Left    • VARICOSE VEIN SURGERY Right 7/11/2018    Procedure: RIGHT SAPHENOUS VEIN RADIO FREQUENCY ABLATION;  Surgeon: Dave Chavarria DO;  Location:  [facility] OR 12;  Service: Vascular     Social History     Socioeconomic History   • Marital status:      Spouse name: Not on file   • Number of children: Not on file   • Years of education: Not on file   • Highest education level: Not on file   Tobacco Use   • Smoking status: Former Smoker     Packs/day: 1.00     Years: 35.00     Pack years: 35.00     Types: Cigarettes     Start date: 1967     Last attempt to quit: 2010     Years since quitting: 10.0   • Smokeless tobacco: Never Used   Substance and Sexual Activity   • Alcohol use: Not Currently     Comment: quit 2008   • Drug use: Never   • Sexual activity: Defer     Family History   Problem Relation Age of Onset   • Cancer Father    • Heart disease Mother    • Diabetes Mother    • Sleep apnea Mother    • Hypertension Mother    • Coronary artery disease Other    • Heart disease Brother    • Kidney failure Brother    • Diabetes Brother    • Lung cancer Brother        Labs:  WBC WBC   Date Value Ref Range Status   01/18/2020 10.22 3.40 - 10.80 10*3/mm3 Final      HGB Hemoglobin   Date Value Ref Range Status   01/18/2020 11.1 (L) 13.0 - 17.7 g/dL Final      HCT Hematocrit   Date Value Ref Range Status   01/18/2020 33.7 (L) 37.5 - 51.0 % Final      Platelets Platelets   Date Value  Ref Range Status   01/18/2020 304 140 - 450 10*3/mm3 Final      MCV MCV   Date Value Ref Range Status   01/18/2020 94.1 79.0 - 97.0 fL Final        Results from last 7 days   Lab Units 01/18/20  1448   SODIUM mmol/L 139   POTASSIUM mmol/L 4.5   CHLORIDE mmol/L 103   CO2 mmol/L 24.0   BUN mg/dL 59*   CREATININE mg/dL 2.31*   CALCIUM mg/dL 8.9   BILIRUBIN mg/dL 1.5*   ALK PHOS U/L 125*   ALT (SGPT) U/L 70*   AST (SGOT) U/L 53*   GLUCOSE mg/dL 106*     Lab Results   Component Value Date    TROPONINI 0.475 (H) 12/29/2018    TROPONINT 0.038 (C) 01/19/2020     PT/INR:    Protime   Date Value Ref Range Status   01/18/2020 15.4 (H) 11.9 - 14.6 Seconds Final   /  INR   Date Value Ref Range Status   01/18/2020 1.18 (H) 0.91 - 1.09 Final       Imaging Results (Last 72 Hours)     Procedure Component Value Units Date/Time    XR Chest PA & Lateral [[id number]] Collected:  01/18/20 1650     Updated:  01/18/20 1654    Narrative:       XR CHEST PA AND LATERAL- 1/18/2020 4:29 PM CST     HISTORY: shortness of breath       COMPARISON: Chest exam dated 07/05/2018.     FINDINGS:   Upright frontal and lateral radiographs of the chest were obtained.     No lung consolidation. No pleural effusion or pneumothorax.  Hyperexpanded lung volumes. Left chest wall AICD. Underlying  cardiomegaly which is stable. Mild central pulmonary congestion without  interstitial or johanna edema. The osseous structures and surrounding soft  tissues demonstrate no acute abnormality.       Impression:       1. Underlying cardiomegaly which is stable. There is a mild central  pulmonary vascular congestion which is also stable. No visualized  infiltrate or new pulmonary edema.     This report was finalized on 01/18/2020 16:51 by Dr Owen Causey, .          Objective     No Known Allergies    Medication Review: Performed  Current Facility-Administered Medications   Medication Dose Route Frequency Provider Last Rate Last Dose   • allopurinol (ZYLOPRIM) tablet 100 mg   "100 mg Oral BID North Gregg MD   100 mg at 01/18/20 2109   • aspirin EC tablet 81 mg  81 mg Oral Daily North Gregg MD   81 mg at 01/18/20 1710   • atorvastatin (LIPITOR) tablet 40 mg  40 mg Oral Daily North Gregg MD   40 mg at 01/18/20 1710   • carvedilol (COREG) tablet 25 mg  25 mg Oral BID With Meals North Gregg MD   25 mg at 01/18/20 1710   • enoxaparin (LOVENOX) syringe 40 mg  40 mg Subcutaneous Q24H North Gregg MD   40 mg at 01/18/20 1710   • [START ON 1/20/2020] famotidine (PEPCID) tablet 40 mg  40 mg Oral Daily North Gregg MD       • HYDROcodone-acetaminophen (NORCO) 7.5-325 MG per tablet 1 tablet  1 tablet Oral Q6H PRN North Gregg MD   1 tablet at 01/18/20 2322   • pantoprazole (PROTONIX) EC tablet 40 mg  40 mg Oral BID North Gregg MD   40 mg at 01/18/20 2108   • sacubitril-valsartan (ENTRESTO) 24-26 MG tablet 1 tablet  1 tablet Oral Q12H North Gregg MD   1 tablet at 01/18/20 2109   • sodium chloride 0.9 % flush 10 mL  10 mL Intravenous Q12H North Gregg MD   10 mL at 01/18/20 2108   • sodium chloride 0.9 % flush 10 mL  10 mL Intravenous PRN North Gregg MD           Vital Sign Min/Max for last 24 hours  Temp  Min: 97.5 °F (36.4 °C)  Max: 98.3 °F (36.8 °C)   BP  Min: 90/58  Max: 115/56   Pulse  Min: 73  Max: 99   Resp  Min: 18  Max: 20   SpO2  Min: 96 %  Max: 99 %   No data recorded   Weight  Min: 97.8 kg (215 lb 9.6 oz)  Max: 106 kg (233 lb 11 oz)     Flowsheet Rows      First Filed Value   Admission Height  193 cm (76\") Documented at 01/18/2020 1328   Admission Weight  106 kg (233 lb 11 oz) Documented at 01/18/2020 1110          Results for orders placed during the hospital encounter of 01/18/20   Adult Transthoracic Echo Complete With Contrast if Necessary Per Protocol    Narrative · The left ventricular cavity is mildly dilated.  · Estimated EF = 20%.  · Left ventricular diastolic dysfunction.  · Left ventricular " systolic function is severely decreased.  · Left atrial cavity size is moderately dilated.  · Mild mitral valve regurgitation is present  · Moderate tricuspid valve regurgitation is present.  · Severe pulmonary hypertension is present.          Physical Exam:    General Appearance: Awake, alert, in no acute distress  Eyes: Pupils equal and reactive    Ears: Appear intact with no abnormalities noted  Nose: Nares normal, no drainage  Neck: supple, trachea midline, no carotid bruit and no JVD  Back: no kyphosis present,    Lungs: respirations regular, respirations even and respirations unlabored    Heart: normal S1, S2,  2/6 pansystolic murmur in the left sternal border,    no rub and no click    Abdomen: normal bowel sounds, no tenderness   Skin: no bleeding, bruising or rash  Extremities: no cyanosis  Psychiatric/Behavioral: Negative for agitation, behavioral problems, confusion, the patient does  appear to be nervous/anxious.          Results Review:   I reviewed the patient's new clinical results.  I reviewed the patient's new imaging results and agree with the interpretation.  I reviewed the patient's other test results and agree with the interpretation  I personally viewed and interpreted the patient's EKG/Telemetry data  Discussed with patient    Reviewed available prior notes, consults, prior visits, laboratory findings, radiology and cardiology relevant reports.   Updated chart as applicable.   I have reviewed the patient's medical history in detail and updated the computerized patient record as relevant.      Updated patient regarding any new or relevant abnormalities on review of records or any new findings on physical exam.   Mentioned to patient about purpose of visit and desirable health short and long term goals and objectives.     Assessment/Plan       Acute on chronic systolic heart failure (CMS/HCC)    PVD (peripheral vascular disease) (CMS/HCC)    Coronary artery disease involving native coronary  artery of native heart without angina pectoris    S/P implantation of automatic cardioverter/defibrillator (AICD)    HLD (hyperlipidemia)    Ischemic cardiomyopathy    HTN (hypertension)    CKD (chronic kidney disease)    COPD, group B, by GOLD 2017 classification (CMS/Formerly [facility])    Severe pulmonary hypertension (CMS/[facility])    Non-sustained ventricular tachycardia (CMS/[facility])        Plan    Dr. Mckeon his primary cardiologist will see tomorrow  Continue aspirin, statin, beta-blocker and Entresto  Add long-acting nitrates and hydralazine  Monitor kidney functions closely  Diuretic therapy as tolerated agree that needs  Upgrade to Bi V ICD given severe left ventricular systolic dysfunction and intraventricular conduction defect.  Moderate to high complexity decision making  Telemetry  Daily weights  Appropriate diet, fluid, sodium, caffeine, stimulants intake   Questions were encouraged, asked and answered to the patient's  understanding and satisfaction.  Compliance to diet and medications       Washington Subramanian MD  01/19/20  9:56 AM    EMR Dragon/Transcription was used to dictate part of this note     Electronically signed by Washington Subramanian MD at 01/19/20 1010          Discharge Summary      Cassandra Smyth APRN at 01/21/20 1045     Attestation signed by Black Chua DO at 01/21/20 1649    I personally evaluated and examined the patient in conjunction with OLI Mendes and agree with the assessment, treatment plan, and disposition of the patient as recorded by her. My history, exam, and further recommendations are:     Agree with discharge.    Black Chua DO  01/21/20  4:49 PM                        [facility]  DISCHARGE SUMMARY       Date of Admission: 1/18/2020  Date of Discharge:  1/21/2020  Primary Care Physician: Alli Perry MD    Presenting Problem/History of Present Illness:  CHF (congestive heart failure) (CMS/Former[facility]) [I50.9]     Final Discharge  Diagnoses:  Active Hospital Problems    Diagnosis   • **Acute on chronic systolic heart failure (CMS/HCC)   • Severe pulmonary hypertension (CMS/HCC)   • Non-sustained ventricular tachycardia (CMS/HCC)   • COPD, group B, by GOLD 2017 classification (CMS/HCC)   • S/P implantation of automatic cardioverter/defibrillator (AICD)   • PVD (peripheral vascular disease) (CMS/HCC)   • Ischemic cardiomyopathy   • Coronary artery disease involving native coronary artery of native heart without angina pectoris     3.0 x28 mm Xience MELISA to the proximal LAD in 12/2018     • CKD (chronic kidney disease)   • HTN (hypertension)   • HLD (hyperlipidemia)       Consults: Dr. Subramanian with cardiology.    Procedures Performed:   Imaging Results (Last 72 Hours)     Procedure Component Value Units Date/Time    XR Chest PA & Lateral [[id number]] Collected:  01/18/20 1650     Updated:  01/18/20 1654    Narrative:       XR CHEST PA AND LATERAL- 1/18/2020 4:29 PM CST     HISTORY: shortness of breath       COMPARISON: Chest exam dated 07/05/2018.     FINDINGS:   Upright frontal and lateral radiographs of the chest were obtained.     No lung consolidation. No pleural effusion or pneumothorax.  Hyperexpanded lung volumes. Left chest wall AICD. Underlying  cardiomegaly which is stable. Mild central pulmonary congestion without  interstitial or johanna edema. The osseous structures and surrounding soft  tissues demonstrate no acute abnormality.       Impression:       1. Underlying cardiomegaly which is stable. There is a mild central  pulmonary vascular congestion which is also stable. No visualized  infiltrate or new pulmonary edema.     This report was finalized on 01/18/2020 16:51 by Dr Owen Causey, .        Pertinent Test Results:   Lab Results (last 72 hours)     Procedure Component Value Units Date/Time    Comprehensive Metabolic Panel [[id number]]  (Abnormal) Collected:  01/20/20 0413    Specimen:  Blood Updated:  01/20/20 0507     Glucose 98  mg/dL      BUN 63 mg/dL      Creatinine 2.29 mg/dL      Sodium 136 mmol/L      Potassium 4.3 mmol/L      Chloride 102 mmol/L      CO2 25.0 mmol/L      Calcium 8.5 mg/dL      Total Protein 5.7 g/dL      Albumin 3.50 g/dL      ALT (SGPT) 50 U/L      AST (SGOT) 52 U/L      Alkaline Phosphatase 112 U/L      Total Bilirubin 1.0 mg/dL      eGFR Non African Amer 28 mL/min/1.73      Globulin 2.2 gm/dL      A/G Ratio 1.6 g/dL      BUN/Creatinine Ratio 27.5     Anion Gap 9.0 mmol/L     Narrative:       GFR Normal >60  Chronic Kidney Disease <60  Kidney Failure <15      CBC (No Diff) [[id number]]  (Abnormal) Collected:  01/20/20 0413    Specimen:  Blood Updated:  01/20/20 0451     WBC 8.52 10*3/mm3      RBC 2.97 10*6/mm3      Hemoglobin 9.3 g/dL      Hematocrit 27.6 %      MCV 92.9 fL      MCH 31.3 pg      MCHC 33.7 g/dL      RDW 20.3 %      RDW-SD 68.5 fl      MPV 14.1 fL      Platelets 246 10*3/mm3     Comprehensive Metabolic Panel [[id number]]  (Abnormal) Collected:  01/19/20 1416    Specimen:  Blood Updated:  01/19/20 1507     Glucose 113 mg/dL      BUN 60 mg/dL      Creatinine 2.37 mg/dL      Sodium 134 mmol/L      Potassium 4.5 mmol/L      Chloride 101 mmol/L      CO2 25.0 mmol/L      Calcium 8.3 mg/dL      Total Protein 5.8 g/dL      Albumin 3.70 g/dL      ALT (SGPT) 54 U/L      AST (SGOT) 40 U/L      Alkaline Phosphatase 114 U/L      Total Bilirubin 0.9 mg/dL      eGFR Non African Amer 27 mL/min/1.73      Globulin 2.1 gm/dL      A/G Ratio 1.8 g/dL      BUN/Creatinine Ratio 25.3     Anion Gap 8.0 mmol/L     Narrative:       GFR Normal >60  Chronic Kidney Disease <60  Kidney Failure <15      Extra Tubes [[id number]] Collected:  01/19/20 0340    Specimen:  Blood, Venous Line Updated:  01/19/20 0700    Narrative:       The following orders were created for panel order Extra Tubes.  Procedure                               Abnormality         Status                     ---------                               -----------          ------                     Lavender Top[[id number]]                                     Final result                 Please view results for these tests on the individual orders.    Lavender Top [[id number]] Collected:  01/19/20 0340    Specimen:  Blood Updated:  01/19/20 0700     Extra Tube hold for add-on     Comment: Auto resulted       Troponin [[id number]]  (Abnormal) Collected:  01/19/20 0334    Specimen:  Blood Updated:  01/19/20 0426     Troponin T 0.038 ng/mL     Narrative:       Troponin T Reference Range:  <= 0.03 ng/mL-   Negative for AMI  >0.03 ng/mL-     Abnormal for myocardial necrosis.  Clinicians would have to utilize clinical acumen, EKG, Troponin and serial changes to determine if it is an Acute Myocardial Infarction or myocardial injury due to an underlying chronic condition.       Results may be falsely decreased if patient taking Biotin.      Troponin [[id number]]  (Normal) Collected:  01/18/20 2017    Specimen:  Blood Updated:  01/18/20 2103     Troponin T 0.030 ng/mL     Narrative:       Troponin T Reference Range:  <= 0.03 ng/mL-   Negative for AMI  >0.03 ng/mL-     Abnormal for myocardial necrosis.  Clinicians would have to utilize clinical acumen, EKG, Troponin and serial changes to determine if it is an Acute Myocardial Infarction or myocardial injury due to an underlying chronic condition.       Results may be falsely decreased if patient taking Biotin.      TSH [[id number]]  (Normal) Collected:  01/18/20 1448    Specimen:  Blood Updated:  01/18/20 1521     TSH 2.990 uIU/mL     Troponin [[id number]]  (Normal) Collected:  01/18/20 1448    Specimen:  Blood Updated:  01/18/20 1520     Troponin T 0.028 ng/mL     Narrative:       Troponin T Reference Range:  <= 0.03 ng/mL-   Negative for AMI  >0.03 ng/mL-     Abnormal for myocardial necrosis.  Clinicians would have to utilize clinical acumen, EKG, Troponin and serial changes to determine if it is an Acute Myocardial Infarction or myocardial injury  due to an underlying chronic condition.       Results may be falsely decreased if patient taking Biotin.      Comprehensive Metabolic Panel [[id number]]  (Abnormal) Collected:  01/18/20 1448    Specimen:  Blood Updated:  01/18/20 1518     Glucose 106 mg/dL      BUN 59 mg/dL      Creatinine 2.31 mg/dL      Sodium 139 mmol/L      Potassium 4.5 mmol/L      Chloride 103 mmol/L      CO2 24.0 mmol/L      Calcium 8.9 mg/dL      Total Protein 6.5 g/dL      Albumin 4.10 g/dL      ALT (SGPT) 70 U/L      AST (SGOT) 53 U/L      Alkaline Phosphatase 125 U/L      Total Bilirubin 1.5 mg/dL      eGFR Non African Amer 28 mL/min/1.73      Globulin 2.4 gm/dL      A/G Ratio 1.7 g/dL      BUN/Creatinine Ratio 25.5     Anion Gap 12.0 mmol/L     Narrative:       GFR Normal >60  Chronic Kidney Disease <60  Kidney Failure <15      BNP [[id number]]  (Abnormal) Collected:  01/18/20 1448    Specimen:  Blood Updated:  01/18/20 1518     proBNP 14,929.0 pg/mL     Narrative:       Among patients with dyspnea, NT-proBNP is highly sensitive for the detection of acute congestive heart failure. In addition NT-proBNP of <300 pg/ml effectively rules out acute congestive heart failure with 99% negative predictive value.    Results may be falsely decreased if patient taking Biotin.      Hemoglobin A1c [[id number]]  (Abnormal) Collected:  01/18/20 1448    Specimen:  Blood Updated:  01/18/20 1510     Hemoglobin A1C 6.40 %     Narrative:       Hemoglobin A1C Ranges:    Increased Risk for Diabetes  5.7% to 6.4%  Diabetes                     >= 6.5%  Diabetic Goal                < 7.0%    Protime-INR [[id number]]  (Abnormal) Collected:  01/18/20 1448    Specimen:  Blood Updated:  01/18/20 1505     Protime 15.4 Seconds      INR 1.18    CBC & Differential [[id number]] Collected:  01/18/20 1448    Specimen:  Blood Updated:  01/18/20 1458    Narrative:       The following orders were created for panel order CBC & Differential.  Procedure                                Abnormality         Status                     ---------                               -----------         ------                     CBC Auto Differential[[id number]]        Abnormal            Final result                 Please view results for these tests on the individual orders.    CBC Auto Differential [[id number]]  (Abnormal) Collected:  01/18/20 1448    Specimen:  Blood Updated:  01/18/20 1458     WBC 10.22 10*3/mm3      RBC 3.58 10*6/mm3      Hemoglobin 11.1 g/dL      Hematocrit 33.7 %      MCV 94.1 fL      MCH 31.0 pg      MCHC 32.9 g/dL      RDW 20.6 %      RDW-SD 70.4 fl      MPV 14.0 fL      Platelets 304 10*3/mm3      Neutrophil % 71.1 %      Lymphocyte % 14.2 %      Monocyte % 9.4 %      Eosinophil % 4.0 %      Basophil % 0.7 %      Neutrophils, Absolute 7.27 10*3/mm3      Lymphocytes, Absolute 1.45 10*3/mm3      Monocytes, Absolute 0.96 10*3/mm3      Eosinophils, Absolute 0.41 10*3/mm3      Basophils, Absolute 0.07 10*3/mm3         Chief Complaint on Day of Discharge: Overall, patient tells me he is feeling much better and is ready to go home.    Hospital Course:  The patient is a 69 y.o. male who presented to [facility] with shortness of breath.  He has a past medical history of COPD, systolic heart failure, gastrointestinal bleed, and chronic kidney disease.  He was recently hospitalized 1/12 to 1/16 for GI bleed, at which time he received IV fluids as well as 2 units of packed red blood cells.  He was discharged on 1/16 with instructions to discontinue use of Brilinta.  Patient reported that he began to experience significant swelling in his lower extremities on Friday along with worsening shortness of breath.  Patient presented to [facility] on 1/18 for worsening shortness of breath, orthopnea, and paroxysmal nocturnal dyspnea.  The patient had tried to sleep in his recliner without any improvement.  He denies subjective fever or chills.  He denies any recent sick contacts.   He denies nausea, vomiting, diarrhea or abdominal pain.  Patient reports he normally wears 2 L nasal cannula at bedtime.  However, he had to increase his oxygen up to 3-1/2 L without any significant improvement of his shortness of breath.  He was admitted to the hospitalist service for further evaluation and management.    Was felt that the patient was volume overloaded due to numerous blood products from recent GI bleed.  The patient was diuresed with IV Lasix twice daily dosing.  He will be transitioned to his home dose of oral Lasix.  Patient has reported good diuresis.  Brilinta was held during hospitalization and aspirin was continued.  Transthoracic echocardiogram was obtained, it revealed left ventricular systolic function severely decreased with an ejection fraction of 20% and left ventricular diastolic dysfunction.  Severe pulmonary hypertension.  Previously in December 2018, ejection fraction was noted to be 31 to 35%.  Recent heart cath from 12/16/2019 showed nonobstructive CAD of major vessels, an acute marginal branch of RCA with smaller caliber with ostial disease.  Medical management was recommended.  He was seen in consultation by Dr. Subramanian with cardiology.  The patient was started on hydralazine and Imdur in addition to his home medicines of aspirin, Lipitor, Coreg, and Entresto.  The patient follows with Dr. Canut in [place], he will need biventricular AICD in the near future.  Patient reports he has an upcoming appointment in March.  Will clarify when appointment is prior to discharge.  He has been cleared by cardiology for discharge.  He has remained chest pain-free and denies shortness of breath.  He is on room air and is hemodynamically stable.  Patient was monitored with strict intake and output, daily weights and cardiac diet.  Renal function is felt to be near baseline.  Electrolytes are stable.  Ace wraps have been added for compression to his lower extremities.  Today, the patient tells  "me he is breathing has much improved.  He has been able to sleep lying flat the last 2 nights.  He was seen ambulating in the wills without difficulty.  Patient tells me he is feeling much better and is ready to go home.  He does have some mild swelling in his lower extremities, although, he tells me that the Ace wraps are helping.  The patient will be discharged with instructions to continue aspirin, Lasix, Lipitor, Coreg and Entresto.  He will not be resumed on hydralazine and Imdur upon discharge.  Brilinta remains on hold at this time due to recent GI bleed.  The patient is to follow-up with his scheduled appointment with Dr. Cantu regarding upgrade to biventricular AICD.  He is to follow-up with his primary care provider within 1 week.  He is to follow-up with cardiology per recommendations.    Condition on Discharge:  Medically stable.    Physical Exam on Discharge:  /54 (BP Location: Left arm, Patient Position: Sitting)   Pulse 78   Temp 98.1 °F (36.7 °C) (Oral)   Resp 18   Ht 193 cm (75.98\")   Wt 99.7 kg (219 lb 12.8 oz)   SpO2 95%   BMI 26.77 kg/m²    Physical Exam  Constitutional: He is oriented to person, place, and time. He appears well-developed and well-nourished. No distress.   HENT:   Head: Normocephalic and atraumatic.   Neck: Normal range of motion. Neck supple. No JVD present. No tracheal deviation present.   Cardiovascular: Normal rate, regular rhythm and intact distal pulses. Exam reveals no gallop and no friction rub.     Sinus 74-85 with a bundle branch block and PVCs  Pulmonary/Chest: Effort normal. He has no wheezes. He has no rales.   Abdominal: Soft. Bowel sounds are normal. He exhibits no distension. There is no tenderness. There is no guarding.   Musculoskeletal: He exhibits edema (BLE mild 1+ edema- now wrapped with ACE wrap). He exhibits no tenderness.   Neurological: He is alert and oriented to person, place, and time. No cranial nerve deficit.   Skin: Skin is warm and " dry. No rash noted. No erythema.   Psychiatric: He has a normal mood and affect. His behavior is normal. Judgment and thought content normal.   Vitals reviewed.    Discharge Disposition:  Home or Self Care    Discharge Medications:     Discharge Medications      Continue These Medications      Instructions Start Date   albuterol sulfate  (90 Base) MCG/ACT inhaler  Commonly known as:  PROVENTIL HFA;VENTOLIN HFA;PROAIR HFA   2 puffs, Inhalation, Every 4 Hours PRN      allopurinol 100 MG tablet  Commonly known as:  ZYLOPRIM   100 mg, Oral, 2 Times Daily      aspirin 81 MG EC tablet   81 mg, Oral, Daily      atorvastatin 40 MG tablet  Commonly known as:  LIPITOR   40 mg, Oral, Daily      carvedilol 25 MG tablet  Commonly known as:  COREG   25 mg, Oral, 2 Times Daily With Meals      famotidine 40 MG tablet  Commonly known as:  PEPCID   40 mg, Oral, 2 Times Daily      furosemide 40 MG tablet  Commonly known as:  LASIX   40 mg, Oral, 2 Times Daily      HYDROcodone-acetaminophen 7.5-325 MG per tablet  Commonly known as:  NORCO   1 tablet, Oral, Every 8 Hours PRN      metOLazone 2.5 MG tablet  Commonly known as:  ZAROXOLYN   2.5 mg, Oral, Every Other Day, Monday Wednesday Friday      ONE-A-DAY MENS 50+ ADVANTAGE tablet   1 tablet, Oral, Daily      pantoprazole 40 MG EC tablet  Commonly known as:  PROTONIX   40 mg, Oral, 2 Times Daily      sacubitril-valsartan 24-26 MG tablet  Commonly known as:  ENTRESTO   1 tablet, Oral, 2 Times Daily         Stop These Medications    ticagrelor 90 MG tablet tablet  Commonly known as:  BRILINTA          Discharge Diet:   Diet Instructions     Diet: Regular, Cardiac; Thin      Discharge Diet:   Regular  Cardiac       Fluid Consistency:  Thin        Heart healthy diet  Low sodium                Activity at Discharge:   Activity Instructions     Activity as Tolerated      Measure Weight      Additional Activity Instructions:      Activity as tolerated                Discharge Care  Plan/Instructions:   1.  Return for worsening symptoms.  2.  Use Ace wraps for compression to lower extremities.    Follow-up Appointments:   Future Appointments   Date Time Provider Department Center   2/5/2020  9:00 AM Katie Parra APRN [facility] None   2/5/2020  1:00 PM [facility] HEART GROUP NP2 [facility]   2/25/2020  8:00 AM PAD US NIVAS CART 1 BH PAD US PAD   2/25/2020  9:00 AM PAD US NIVAS CART 1 BH PAD US PAD   2/25/2020 10:00 AM BickingDave DO [facility] VS PAD None   4/1/2020 11:15 AM Katie Parra APRN [facility] None   4/6/2020  9:45 AM John Mckeon MD [facility] CD [facility]   7/14/2020 12:30 PM PACEMAKER HEART GRP GUIDANT [facility] CD P[facility]   1.  He is to follow-up with his primary care provider within 1 week.    2.  He is to follow-up with cardiology per recommendations.  3.  The patient is to follow-up with his scheduled appointment with Dr. Cantu regarding upgrade to biventricular AICD.      Test Results Pending at Discharge: None.    OLI Stovall  01/21/20  3:39 PM    Time: 40 minutes.          Electronically signed by Black Chua DO at 01/21/20 [id number]

## 2020-01-23 NOTE — OUTREACH NOTE
CHF Week 1 Survey      Responses   Facility patient discharged from?  [facility]   Does the patient have one of the following disease processes/diagnoses(primary or secondary)?  CHF   Is there a successful TCM telephone encounter documented?  No   CHF Week 1 attempt successful?  Yes   Call start time  0923   Call end time  0925   Discharge diagnosis  Acute on chronic systolic heart failure    Meds reviewed with patient/caregiver?  Yes   Is the patient having any side effects they believe may be caused by any medication additions or changes?  No   Does the patient have all medications ordered at discharge?  Yes   Is the patient taking all medications as directed (includes completed medication regime)?  Yes   Does the patient have a primary care provider?   Yes   Does the patient have an appointment with their PCP within 7 days of discharge?  Yes   Has the patient kept scheduled appointments due by today?  Yes   Has home health visited the patient within 72 hours of discharge?  N/A   Psychosocial issues?  No   Comments  Brief call. Doing well. Pt keeps on repeating that there are no issues.   Did the patient receive a copy of their discharge instructions?  Yes   Nursing interventions  Reviewed instructions with patient   What is the patient's perception of their health status since discharge?  Improving   Nursing interventions  Nurse provided patient education   Is the patient weighing daily?  Yes   Does the patient have scales?  Yes   Daily weight interventions  Education provided on importance of daily weight   Is the patient able to teach back Heart Failure diet management?  Yes   Is the patient able to teach back Heart Failure Zones?  Yes   Is the patient able to teach back signs and symptoms of worsening condition? (i.e. weight gain, shortness of air, etc.)  Yes   Is the patient/caregiver able to teach back the hierarchy of who to call/visit for symptoms/problems? PCP, Specialist, Home health nurse, Urgent Care, ED,  911  Yes    CHF Week 1 call completed?  Yes          Eric Silva RN

## 2020-01-30 NOTE — OUTREACH NOTE
CHF Week 2 Survey      Responses   Facility patient discharged from?  [facility]   Does the patient have one of the following disease processes/diagnoses(primary or secondary)?  CHF   Week 2 attempt successful?  Yes   Call start time  1229   Call end time  1231   Discharge diagnosis  Acute on chronic systolic heart failure    Meds reviewed with patient/caregiver?  Yes   Is the patient having any side effects they believe may be caused by any medication additions or changes?  No   Does the patient have all medications ordered at discharge?  Yes   Is the patient taking all medications as directed (includes completed medication regime)?  Yes   Does the patient have a primary care provider?   Yes   Does the patient have an appointment with their PCP within 7 days of discharge?  Yes   Has the patient kept scheduled appointments due by today?  Yes   Has home health visited the patient within 72 hours of discharge?  N/A   Psychosocial issues?  No   Did the patient receive a copy of their discharge instructions?  Yes   Nursing interventions  Reviewed instructions with patient   What is the patient's perception of their health status since discharge?  Improving   Nursing interventions  Nurse provided patient education   Is the patient weighing daily?  Yes   Does the patient have scales?  Yes   Daily weight interventions  Education provided on importance of daily weight   Is the patient able to teach back Heart Failure diet management?  Yes   Is the patient able to teach back Heart Failure Zones?  Yes   Is the patient able to teach back signs and symptoms of worsening condition? (i.e. weight gain, shortness of air, etc.)  Yes   Is the patient/caregiver able to teach back the hierarchy of who to call/visit for symptoms/problems? PCP, Specialist, Home health nurse, Urgent Care, ED, 911  Yes   Additional teach back comments  He is doing well, says he will go to the DrEma if any issues.   CHF Week 2 call completed?  Yes           Marcela Brand, RN

## 2020-02-05 NOTE — PROGRESS NOTES
"    Subjective:     Encounter Date:02/05/2020      Patient ID: Jadon Flynn is a 69 y.o. male.    Chief Complaint: follow up CAD and CHF   The patient presents for a follow up regarding his systolic CHF s/p single chamber AICD, NSVT also followed by Dr. Cantu, and CAD. He was recently taken off of Brilinta following a hospitalization for a GI bleed. He continues to take aspirin. He received a blood transfusion as well as IV fluids during his admission for GI bleed, and was admitted to [facility] shortly thereafter for acute CHF exacerbation. He was diuresed and his medical therapy was optimized and he was discharged home in stable condition on 1/21/2020. He states he is weighing daily at home and his weight has not changed since discharge. He has persistent lower extremity edema, but he states it has improved significantly. He denies dyspnea, orthopnea, PND, palpitations, chest pain, syncope or pre syncope. He reports his SBP is typically low 100s in the am and 90s after he takes his medications. He has an upcoming appt with Dr. Cantu 3/5- he states he plans to discuss potential BIV ICD upgrade at that time. He reports he also follows with pulmonology (COPD) and Uriel Lynn ([facility]). He states Dr. Trevino now has him on metolazone 2.5 mg MWF as well. Also of note, per his most recent hospitalization, his LVEF has dropped from 20% to 30-35%.       The following portions of the patient's history were reviewed and updated as appropriate: allergies, current medications, past family history, past medical history, past social history, past surgical history and problem list.  /68   Pulse 75   Ht 193 cm (76\")   Wt 98.9 kg (218 lb)   SpO2 98%   BMI 26.54 kg/m²   No Known Allergies    Current Outpatient Medications:   •  allopurinol (ZYLOPRIM) 100 MG tablet, Take 100 mg by mouth 2 (Two) Times a Day., Disp: , Rfl:   •  aspirin 81 MG EC tablet, Take 81 mg by mouth Daily., Disp: , Rfl:   •  atorvastatin (LIPITOR) 40 MG " tablet, TAKE 1 TABLET DAILY, Disp: 90 tablet, Rfl: 4  •  carvedilol (COREG) 25 MG tablet, Take 25 mg by mouth 2 (Two) Times a Day With Meals., Disp: , Rfl:   •  famotidine (PEPCID) 40 MG tablet, Take 40 mg by mouth 2 (Two) Times a Day., Disp: , Rfl:   •  furosemide (LASIX) 40 MG tablet, Take 40 mg by mouth 2 (Two) Times a Day., Disp: , Rfl:   •  HYDROcodone-acetaminophen (NORCO) 7.5-325 MG per tablet, Take 1 tablet by mouth Every 8 (Eight) Hours As Needed for Moderate Pain ., Disp: , Rfl: 0  •  metOLazone (ZAROXOLYN) 2.5 MG tablet, Take 2.5 mg by mouth Every Other Day. Monday Wednesday Friday, Disp: , Rfl:   •  Multiple Vitamins-Minerals (ONE-A-DAY MENS 50+ ADVANTAGE) tablet, Take 1 tablet by mouth Daily., Disp: , Rfl:   •  pantoprazole (PROTONIX) 40 MG EC tablet, Take 40 mg by mouth 2 (Two) Times a Day., Disp: , Rfl:   •  sacubitril-valsartan (ENTRESTO) 24-26 MG tablet, Take 1 tablet by mouth 2 (Two) Times a Day., Disp: , Rfl:   Past Medical History:   Diagnosis Date   • Asymptomatic bilateral carotid artery stenosis    • CAD (coronary artery disease) 12/07/2016   • CAD in native artery      2009 stents   • Carotid artery disease (CMS/[facility])    • CHF (congestive heart failure) (CMS/[facility]) 12/07/2016   • Chronic combined systolic and diastolic CHF (congestive heart failure) (CMS/[facility])     echo 5/2013 EF 35-40%   • Chronic systolic CHF (congestive heart failure), NYHA class 2 (CMS/[facility])    • CKD (chronic kidney disease) 12/07/2016   • CKD (chronic kidney disease), stage III (CMS/[facility])    • COPD, group B, by GOLD 2017 classification (CMS/[facility]) 11/20/2018   • Essential hypertension    • Gout    • HLD (hyperlipidemia) 12/07/2016   • HTN (hypertension) 12/07/2016   • Hyperkalemia    • Hyperlipidemia, unspecified    • Ischemic heart disease 12/07/2016   • Ischemic heart disease    • NSTEMI (non-ST elevated myocardial infarction) (CMS/[facility]) 12/28/2018   • Peripheral vascular disease (CMS/H[facility])    • Pinched nerve in neck    • PVD  (peripheral vascular disease) (CMS/HCC) 12/07/2016   • S/P implantation of automatic cardioverter/defibrillator (AICD) 12/07/2016   • S/P implantation of automatic cardioverter/defibrillator (AICD)    • Stroke (CMS/[facility])      Past Surgical History:   Procedure Laterality Date   • CARDIAC CATHETERIZATION N/A 12/28/2018    Procedure: Coronary angiography;  Surgeon: John Mckeon MD;  Location:  [facility] CATH INVASIVE LOCATION;  Service: Cardiology   • CARDIAC DEFIBRILLATOR PLACEMENT     • CAROTID ENDARTERECTOMY     • CARPAL TUNNEL RELEASE Right    • CORONARY STENT PLACEMENT      x2   • FINGER SURGERY Right     tendon repair    • HYDROCELE EXCISION / REPAIR     • ILIAC ARTERY STENT     • INSERT / REPLACE / REMOVE PACEMAKER  2011   • ARLIN PROCEDURE     • TOTAL KNEE ARTHROPLASTY Left    • VARICOSE VEIN SURGERY Right 7/11/2018    Procedure: RIGHT SAPHENOUS VEIN RADIO FREQUENCY ABLATION;  Surgeon: Dave Chavarria DO;  Location:  [facility] HYBRID OR 12;  Service: Vascular     Social History     Socioeconomic History   • Marital status:      Spouse name: Not on file   • Number of children: Not on file   • Years of education: Not on file   • Highest education level: Not on file   Tobacco Use   • Smoking status: Former Smoker     Packs/day: 1.00     Years: 35.00     Pack years: 35.00     Types: Cigarettes     Start date: 1967     Last attempt to quit: 2010     Years since quitting: 10.1   • Smokeless tobacco: Never Used   Substance and Sexual Activity   • Alcohol use: Not Currently     Comment: quit 2008   • Drug use: Never   • Sexual activity: Defer     Family History   Problem Relation Age of Onset   • Cancer Father    • Heart disease Mother    • Diabetes Mother    • Sleep apnea Mother    • Hypertension Mother    • Coronary artery disease Other    • Heart disease Brother    • Kidney failure Brother    • Diabetes Brother    • Lung cancer Brother        Review of Systems   Constitution: Positive for malaise/fatigue.  Negative for chills, diaphoresis and fever.   HENT: Negative for nosebleeds.    Eyes: Negative for visual disturbance.   Cardiovascular: Positive for leg swelling. Negative for chest pain, claudication, cyanosis, dyspnea on exertion, irregular heartbeat, near-syncope, orthopnea, palpitations, paroxysmal nocturnal dyspnea and syncope.   Respiratory: Negative for cough, hemoptysis, shortness of breath, sputum production and wheezing.    Hematologic/Lymphatic: Negative for bleeding problem.   Skin: Negative for color change and flushing.   Musculoskeletal: Negative for falls.   Gastrointestinal: Negative for bloating, abdominal pain, hematemesis, hematochezia, melena, nausea and vomiting.   Genitourinary: Negative for hematuria.   Neurological: Negative for dizziness, light-headedness and weakness.   Psychiatric/Behavioral: Negative for altered mental status.         ECG 12 Lead  Date/Time: 2/5/2020 2:04 PM  Performed by: Sisi Rutledge APRN  Authorized by: Sisi Rutledge APRN   Comparison: compared with previous ECG from 1/18/2020  Similar to previous ECG  Rhythm: sinus rhythm  Ectopy: multifocal PVCs  BPM: 75  Conduction: left bundle branch block               Objective:     Physical Exam   Constitutional: He is oriented to person, place, and time. He appears well-developed and well-nourished. No distress.   HENT:   Head: Normocephalic and atraumatic.   Eyes: Pupils are equal, round, and reactive to light.   Neck: Normal range of motion. Neck supple. No JVD present. No thyromegaly present.   Cardiovascular: Normal rate, regular rhythm, normal heart sounds and intact distal pulses. Exam reveals no gallop and no friction rub.   No murmur heard.  Pulmonary/Chest: Effort normal. No respiratory distress. He has no wheezes. He has rales (minimal faint rales bases ). He exhibits no tenderness.   Abdominal: Soft. Bowel sounds are normal. He exhibits no distension. There is no tenderness.   Musculoskeletal: Normal range of  motion. He exhibits edema (2-3+ BLE edema ).   Neurological: He is alert and oriented to person, place, and time. No cranial nerve deficit.   Skin: Skin is warm and dry. He is not diaphoretic.   Psychiatric: He has a normal mood and affect. His behavior is normal.     Echo Review:   1/18/2020 echo :  · The left ventricular cavity is mildly dilated.  · Estimated EF = 20%.  · Left ventricular diastolic dysfunction.  · Left ventricular systolic function is severely decreased.  · Left atrial cavity size is moderately dilated.  · Mild mitral valve regurgitation is present  · Moderate tricuspid valve regurgitation is present.  · Severe pulmonary hypertension is present.        Assessment:          Diagnosis Plan   1. Coronary artery disease involving native coronary artery of native heart without angina pectoris  Stable. No angina     PCI with MELISA to proximal LAD 12/2018 for NSTEMI     PCI to LAD 2011    Of note, he has a history of Plavix resistance   He is now on aspirin only due to recent GI bleed      2. Chronic systolic congestive heart failure    Stage C, Class III. Overall he appears compensated, but does have some persistent BLE edema- he states this is often gone in am when he wakes up        3. S/P implantation of automatic cardioverter/defibrillator (AICD)  Single chamber- he is scheduled to see Dr. Cantu 3/5/2020 to discuss BIV ICD upgrade      4. Chronic kidney disease, unspecified CKD stage  Followed by Dr. Trevino      5. Venous insufficiency    Followed by Dr. Chavarria.      6. PVD (peripheral vascular disease)  Carotid disease followed by Dr. Chavarria    He is s/p right iliac stents x 2 per Dr. Mckeon following a dissection 12/2018      7. Essential hypertension  Controlled ; BP lower end of normal        8. Left bundle branch block - chronic    9. Non sustained ventricular tachycardia - followed by Dr. Cantu, he was previously on amiodarone but quit taking this quite some time ago. 1/2020 ICD interrogation  reviewed - per my discussion and review of interrogation with Ekaterina Rocha RN with the device clinic, his longest NSVT run was 4 seconds, not 17 seconds. Will send copy to Dr. Cantu. Dr. Cantu's recommendations as of 11/2019 were to stay off of amiodarone for the time being.     10. COPD - followed by pulmonology     11. Hyperlipidemia - continue statin, LDL controlled at 60 per 12/2019 lipid panel      Plan:       As noted above   CMP today given recent med changes- Entresto, lasix, metolazone ; will call with any med changes pending the CMP results   Continue current doses of aspirin, atorvastatin, coreg, lasix, zaroxolyn and Entresto    We discussed flexible lasix dosing - he may take 1 additional 40 mg tablet daily PRN for worsening dyspnea, orthopnea, edema, or sudden significant weight gain   Keep follow up with Dr. Cantu 3/5  Keep follow up with Dr. Mckeon in April   Follow up 4 weeks, sooner with new or worsening symptoms to concerns     Reviewed signs and symptoms of CHF and what to report with the patient. Patient instructed to restrict sodium and weigh daily. Report weight gain of greater than 2 lbs overnight or 5 lbs in 1 week. Pt verbalized understanding of instructions and plan of care.

## 2020-02-07 NOTE — OUTREACH NOTE
CHF Week 3 Survey      Responses   Facility patient discharged from?  [facility]   Does the patient have one of the following disease processes/diagnoses(primary or secondary)?  CHF   Week 3 attempt successful?  No   Unsuccessful attempts  Attempt 1          Nara Armstrong RN
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
## 2020-02-12 NOTE — TELEPHONE ENCOUNTER
Notified patient to discontinue the matolazone and only take lasix 40mg BID (no extra doses). Also asked him to come in for a BMP after this medication change has been in effect for one week, around 2/20/20. He verbalized understanding.Kristin España MA

## 2020-02-17 NOTE — PROGRESS NOTES
Single Chamber AICD Evaluation Report  REMOTE/LATITUDE    February 17, 2020    Primary Cardiologist: Mike  : Guidant Model: Teligen 100 E102  Implant date: 11/9/2011    Reason for evaluation: remote report with ATP treated VT x 2  Indication for AICD: ischemic cardiomyopathy    Measurements  Ventricular sensing - R wave: 1.9 mV  Ventricular threshold: N/P  Ventricular lead impedance: 334 ohms  Shock Impedance: RV 38 ohms    Diagnostic Data  Paced: 1 %    Episodes recorded since 1/16/2020:  2/16/2020:  VT at 202 bpm, duration 10 seconds, treated successfully with 1 sequence of ATP  2/15/2020:  VT at 208 bpm, duration 9 seconds, treated successfully with 1 sequence of ATP  9 NS-VT episodes, rates 182-196 bpm  No shocks.    Battery status: satisfactory, estimated 4.5 years remaining     Final Parameters  Mode: VVI  Lower rate: 50 bpm   Ventricular - Amplitude: 5 V   Pulse width: 0.4 ms   Sensitivity: 0.6 mV   Changes made: No changes made.  Conclusions: normal AICD function, adequate battery reserve and ATP treated VT x 2    Follow up: Every 3 months via latitude, annually in office (7/14/2020)

## 2020-02-19 NOTE — OUTREACH NOTE
CHF Week 4 Survey      Responses   Facility patient discharged from?  [facility]   Does the patient have one of the following disease processes/diagnoses(primary or secondary)?  CHF   Week 4 attempt successful?  Yes   Call start time  1156   Call end time  1203   Discharge diagnosis  Acute on chronic systolic heart failure    Meds reviewed with patient/caregiver?  Yes   Is the patient taking all medications as directed (includes completed medication regime)?  Yes   Has the patient kept scheduled appointments due by today?  Yes   Psychosocial issues?  No   Comments  Pt reports that he still having LE edema intermittently.    What is the patient's perception of their health status since discharge?  Same   Is the patient weighing daily?  Yes   Is the patient able to teach back Heart Failure diet management?  Yes   Week 4 Call Completed?  Yes   Would the patient like one additional call?  No   Graduated  Yes   Did the patient feel the follow up calls were helpful during their recovery period?  No   Was the number of calls appropriate?  Yes   Does the patient have an Advance Directive or Living Will?  Yes          Nara Armstrong RN

## 2020-02-20 PROBLEM — N18.9 ACUTE ON CHRONIC RENAL FAILURE (HCC): Status: ACTIVE | Noted: 2020-01-01

## 2020-02-20 PROBLEM — N17.9 ACUTE ON CHRONIC RENAL FAILURE (HCC): Status: ACTIVE | Noted: 2020-01-01

## 2020-02-21 PROBLEM — I13.10 CARDIORENAL SYNDROME: Status: ACTIVE | Noted: 2020-01-01

## 2020-02-21 NOTE — PROGRESS NOTES
I called pt and had to leave a msg.  I told pt we will order the stress test and someone will call to schedule it from our office.  Gerald Back, CMA

## 2020-02-22 PROBLEM — T82.120A AICD LEAD DISPLACEMENT: Status: ACTIVE | Noted: 2020-01-01

## 2020-02-24 PROBLEM — N18.4 ACUTE RENAL FAILURE SUPERIMPOSED ON STAGE 4 CHRONIC KIDNEY DISEASE (HCC): Status: ACTIVE | Noted: 2020-01-01

## 2020-02-24 NOTE — TELEPHONE ENCOUNTER
Left message letting Mr Flynn know that I was cancelling his appointments for Tuesday, February 25th, 2020 due to him being inpatient and would reschedule once he was out of the hospital. Also advised Mr Flynn to call the office at [phone] if he had any questions or concerns.

## 2020-02-26 NOTE — ANESTHESIA PREPROCEDURE EVALUATION
Anesthesia Evaluation     Patient summary reviewed   no history of anesthetic complications:  NPO Solid Status: > 8 hours  NPO Liquid Status: > 8 hours           Airway   Mallampati: I  TM distance: >3 FB  Neck ROM: full  No difficulty expected  Dental - normal exam     Pulmonary    (+) COPD,   (-) sleep apnea  Cardiovascular     Patient on routine beta blocker and Beta blocker given within 24 hours of surgery    (+) pacemaker (Presented for ICD/pacemaker replacement on 2/21, taken abck to cath lab 2/22 after lead dislodgement- Dsg.nr) pacemaker, ICD, hypertension, valvular problems/murmurs, past MI , CAD, cardiac stents CHF (calculated EF on TTE 31.5%) Systolic <55%, PVD, hyperlipidemia,     ROS comment: Echo:  ·The left ventricular cavity is mild-to-moderately dilated.  ·Right ventricular cavity is moderately dilated.  ·Moderately reduced right ventricular systolic function noted.  ·Left atrial cavity size is dilated.  ·Mild mitral valve regurgitation is present  ·Mild tricuspid valve regurgitation is present.    Neuro/Psych  (+) CVA,     (-) seizures, TIA  GI/Hepatic/Renal/Endo    (+)  GERD,  renal disease ARF,   (-) liver disease    Musculoskeletal     Abdominal    Substance History      OB/GYN          Other   blood dyscrasia anemia,         Phys Exam Other: Left upper recent scar with dermabond from ICD implant site                Anesthesia Plan    ASA 4 - emergent     MAC     intravenous induction     Anesthetic plan, all risks, benefits, and alternatives have been provided, discussed and informed consent has been obtained with: patient.

## 2020-02-27 NOTE — ANESTHESIA POSTPROCEDURE EVALUATION
"Patient: Jadon Flynn    Procedure Summary     Date:  02/26/20 Room / Location:  [facility] OR  /  [facility] HYBRID OR 12    Anesthesia Start:  1649 Anesthesia Stop:  1720    Procedure:  HEMODIALYSIS CATHETER INSERTION (Right Neck) Diagnosis:       Acute renal failure superimposed on stage 4 chronic kidney disease, unspecified acute renal failure type (CMS/HCC)      (Acute renal failure superimposed on stage 4 chronic kidney disease, unspecified acute renal failure type (CMS/HCC) [N17.9, N18.4])    Surgeon:  Dave Chavarria DO Provider:  Greg Levine CRNA    Anesthesia Type:  MAC ASA Status:  4 - Emergent          Anesthesia Type: MAC    Vitals  Vitals Value Taken Time   /63 2/26/2020  6:14 PM   Temp 98 °F (36.7 °C) 2/26/2020  6:00 PM   Pulse 78 2/26/2020  6:17 PM   Resp 20 2/26/2020  6:00 PM   SpO2 97 % 2/26/2020  6:17 PM   Vitals shown include unvalidated device data.        Post Anesthesia Care and Evaluation    Patient location during evaluation: PACU  Patient participation: complete - patient participated  Level of consciousness: awake and alert  Pain management: adequate  Airway patency: patent  Anesthetic complications: No anesthetic complications  PONV Status: none  Cardiovascular status: acceptable and hemodynamically stable  Respiratory status: acceptable  Hydration status: acceptable    Comments: Blood pressure 97/50, pulse 60, temperature 97.5 °F (36.4 °C), temperature source Oral, resp. rate 18, height 193 cm (75.98\"), weight 100 kg (220 lb 6.4 oz), SpO2 96 %.    Patient discharged from PACU based upon [place] score. Please see RN notes for further details      "

## 2020-02-29 NOTE — OUTREACH NOTE
Prep Survey      Responses   Facility patient discharged from?  [facility]   Is patient eligible?  Yes   Discharge diagnosis  **Acute renal failure superimposed on stage 4 chronic kidney disease    Does the patient have one of the following disease processes/diagnoses(primary or secondary)?  Other   Does the patient have Home health ordered?  No   Is there a DME ordered?  No   Comments regarding appointments  HEMODIALYSIS CATHETER INSERTION and Bi V upgrade this hospital stay    Medication alerts for this patient  Bumex, Pacerone    General alerts for this patient  CHF/COPD         New HD pt    Prep survey completed?  Yes          Sarita Miles RN

## 2020-03-02 NOTE — OUTREACH NOTE
Medical Week 1 Survey      Responses   Facility patient discharged from?  [facility]   Does the patient have one of the following disease processes/diagnoses(primary or secondary)?  Other   Is there a successful TCM telephone encounter documented?  No   Week 1 attempt successful?  Yes   Call start time  1704   Call end time  1711   Discharge diagnosis  **Acute renal failure superimposed on stage 4 chronic kidney disease    Meds reviewed with patient/caregiver?  Yes   Is the patient having any side effects they believe may be caused by any medication additions or changes?  No   Does the patient have all medications ordered at discharge?  Yes   Is the patient taking all medications as directed (includes completed medication regime)?  Yes   Does the patient have a primary care provider?   Yes   Does the patient have an appointment with their PCP within 7 days of discharge?  Greater than 7 days   What is preventing the patient from scheduling follow up appointments within 7 days of discharge?  Earlier appointment not available   Nursing Interventions  Verified appointment date/time/provider   Has the patient kept scheduled appointments due by today?  N/A   Has home health visited the patient within 72 hours of discharge?  N/A   Psychosocial issues?  No   Did the patient receive a copy of their discharge instructions?  Yes   Nursing interventions  Reviewed instructions with patient   What is the patient's perception of their health status since discharge?  Improving   Is the patient/caregiver able to teach back signs and symptoms related to disease process for when to call PCP?  Yes   Is the patient/caregiver able to teach back signs and symptoms related to disease process for when to call 911?  Yes   Is the patient/caregiver able to teach back the hierarchy of who to call/visit for symptoms/problems? PCP, Specialist, Home health nurse, Urgent Care, ED, 911  Yes   Additional teach back comments  pt started dialysis on  2/29/20 and was told not to be taking diuretics while on dialysis and pt is on bumex, advised to take AVS to next dialysis on 3/3 and have staff contact MD before taking Bumex    Week 1 call completed?  Yes          Amanda Anderson RN

## 2020-03-02 NOTE — PROGRESS NOTES
Bi-V AICD Evaluation Report  REMOTE/LATITUDE    March 2, 2020     Interrogation Date:  2/29/2020    Primary Cardiologist: Mike  : Guidant Model: Momentum X4 CRT-D G138  Implant date: 2/21/2020, atrial lead revision 2/22/2020    Reason for evaluation: remote transmission with ATP treated VT  Indication for AICD: congestive heart failure    Measurements  Atrial sensing:  P wave: 1.1 mV  Atrial threshold: 3.3 V @ 0.4 ms  Atrial lead impedance: 711 ohms  Ventricular sensing:  R wave: N/R mV  RV Threshold:  4V @ 0.4 ms  RV Impedance:  658 ohms  Shock impedance:  RV 33 ohms  LV Threshold:  0.8V @ 0.4ms  LV Impedance:  484 ohms      Diagnostic Data  Atrial paced: 6 %  Ventricular paced: RV 37%, LV 69%    Episodes recorded since 2/23/2020:  3 VT episodes on 2/23/2020, while patient was hospitalized:  2 treated with 1 sequence of ATP each.  Rates 208-211 bpm.  Longest duration 6 seconds.    Battery status: satisfactory   Estimated 11 years remaining      Final Parameters  Mode: DDD     Lower rate: 60 bpm   Upper rate: 130 bpm  AV Delay: 210 ms paced    140 ms sensed   Atrial - Amplitude: 3.5 V   Pulse width: 0.4 ms   Sensitivity: 0.15 mV   Ventricular:  RV Amplitude: 3.5 V @ 0.4 ms   Sensitivity: 0.6 mV            LV Amplitude:  3.5V @ 0.4ms  Changes made: No changes made.  Conclusions: elevated ventricular pacing threshold, elevated atrial pacing threshold and adequate battery reserve    Follow up: Remotely via latitude, 4/7/2020 in office

## 2020-03-11 NOTE — PROGRESS NOTES
Subjective:     Encounter Date:03/11/2020      Patient ID: Jadon Flynn is a 69 y.o. male.    Chief Complaint: follow up CAD and CHF   The patient presents for a follow up regarding his systolic CHF now with BIV ICD, NSVT also followed by Dr. Cantu, and CAD. He was recently taken off of Brilinta following a hospitalization for a GI bleed. He continues to take aspirin. He received a blood transfusion as well as IV fluids during his admission for GI bleed, and was admitted to [facility] shortly thereafter for acute CHF exacerbation. He was diuresed and his medical therapy was optimized and he was discharged home in stable condition on 1/21/2020.     More recently, the patient had an a hospital admission 2/20 to 2/28 for acute CHF and acute renal failure/cardiorenal syndrome and ultimately had to be started on hemodialysis on 2/27/20. He follows at the dialysis clinic in [place] and has HD Tuesday, Thursday, Saturday (last treatment was yesterday). During his hospitalization, he was taken off of Entresto due to EMMANUEL and hypotension and his coreg dose was cut in half as well. He was started on amiodarone 2/24/20 for more NSVT treated with ATP x 2 on 2/23. Also during that same hospitalization he underwent upgrade to a BIV ICD with 3 new leads on 2/21. He was taken back to the lab for atrial lead repositioning on 2/22. He had an appt with Dr. Cantu 3/5 but reports he was unable to make that appt due to dialysis.    Today he states he is feeling fair overall. He admits persistent lower extremity edema, but reports it has improved somewhat with dialysis. He admits at first he was not being compliant with his cardiac renal diet, but is doing better with this in recent days. He reports stable TOSCANO. He denies orthopnea, PND, palpitations, chest pain, syncope or pre syncope. He reports compliance with his medications and states his SBP is 90s-110s and he typically has some hypotension with dialysis.           The following  "portions of the patient's history were reviewed and updated as appropriate: allergies, current medications, past family history, past medical history, past social history, past surgical history and problem list.  /65 (BP Location: Left arm, Patient Position: Sitting, Cuff Size: Adult)   Pulse 67   Resp 18   Ht 190.5 cm (75\")   Wt 103 kg (226 lb)   SpO2 97%   BMI 28.25 kg/m²   No Known Allergies    Current Outpatient Medications:   •  allopurinol (ZYLOPRIM) 100 MG tablet, Take 100 mg by mouth 2 (Two) Times a Day., Disp: , Rfl:   •  amiodarone (PACERONE) 200 MG tablet, Take 1 tablet by mouth 2 (Two) Times a Day., Disp: 180 tablet, Rfl: 1  •  aspirin 81 MG EC tablet, Take 81 mg by mouth Daily., Disp: , Rfl:   •  atorvastatin (LIPITOR) 40 MG tablet, Take 40 mg by mouth Daily., Disp: , Rfl:   •  bumetanide (BUMEX) 1 MG tablet, Take 1 tablet by mouth 2 (Two) Times a Day., Disp: 180 tablet, Rfl: 3  •  calcitriol (ROCALTROL) 0.25 MCG capsule, Take 0.25 mcg by mouth Daily., Disp: , Rfl:   •  carvedilol (COREG) 12.5 MG tablet, Take 1 tablet by mouth 2 (Two) Times a Day With Meals., Disp: 180 tablet, Rfl: 3  •  HYDROcodone-acetaminophen (NORCO) 7.5-325 MG per tablet, Take 1 tablet by mouth Every 8 (Eight) Hours As Needed for Moderate Pain ., Disp: , Rfl: 0  •  Multiple Vitamins-Minerals (ONE-A-DAY MENS 50+ ADVANTAGE) tablet, Take 1 tablet by mouth Daily., Disp: , Rfl:   •  pantoprazole (PROTONIX) 40 MG EC tablet, Take 40 mg by mouth 2 (Two) Times a Day., Disp: , Rfl:   Past Medical History:   Diagnosis Date   • Asymptomatic bilateral carotid artery stenosis    • CAD (coronary artery disease) 12/07/2016   • CAD in native artery      2009 stents   • Carotid artery disease (CMS/[facility])    • CHF (congestive heart failure) (CMS/[facility]) 12/07/2016   • Chronic combined systolic and diastolic CHF (congestive heart failure) (CMS/[facility])     echo 5/2013 EF 35-40%   • Chronic systolic CHF (congestive heart failure), NYHA class 2 " (CMS/HCC)    • CKD (chronic kidney disease) 12/07/2016   • CKD (chronic kidney disease), stage III (CMS/[facility])    • COPD, group B, by GOLD 2017 classification (CMS/[facility]) 11/20/2018   • Essential hypertension    • Gout    • HLD (hyperlipidemia) 12/07/2016   • HTN (hypertension) 12/07/2016   • Hyperkalemia    • Hyperlipidemia, unspecified    • ICAO (internal carotid artery occlusion), right 3/27/2018   • Ischemic heart disease 12/07/2016   • Ischemic heart disease    • Non-sustained ventricular tachycardia (CMS/[facility]) 1/18/2020   • NSTEMI (non-ST elevated myocardial infarction) (CMS/HCC) 12/28/2018   • Peripheral vascular disease (CMS/HCC)    • Pinched nerve in neck    • PVD (peripheral vascular disease) (CMS/HCC) 12/07/2016   • S/P implantation of automatic cardioverter/defibrillator (AICD) 12/07/2016   • S/P implantation of automatic cardioverter/defibrillator (AICD)    • Stroke (CMS/HCC)    • Ventricular tachycardia (CMS/HCC) 10/30/2018     Past Surgical History:   Procedure Laterality Date   • CARDIAC CATHETERIZATION N/A 12/28/2018    Procedure: Coronary angiography;  Surgeon: John Mckeon MD;  Location:  [facility] CATH INVASIVE LOCATION;  Service: Cardiology   • CARDIAC DEFIBRILLATOR PLACEMENT     • CARDIAC ELECTROPHYSIOLOGY PROCEDURE Left 2/21/2020    Procedure: Bi V upgrade;  Surgeon: John Mckeon MD;  Location:  [facility] CATH INVASIVE LOCATION;  Service: Cardiology;  Laterality: Left;   • CARDIAC ELECTROPHYSIOLOGY PROCEDURE N/A 2/22/2020    Procedure: Lead Revision;  Surgeon: John Mckeon MD;  Location:  [facility] CATH INVASIVE LOCATION;  Service: Cardiology;  Laterality: N/A;   • CAROTID ENDARTERECTOMY     • CARPAL TUNNEL RELEASE Right    • CORONARY STENT PLACEMENT      x2   • FINGER SURGERY Right     tendon repair    • HYDROCELE EXCISION / REPAIR     • ILIAC ARTERY STENT     • INSERT / REPLACE / REMOVE PACEMAKER  2011   • INSERTION HEMODIALYSIS CATHETER Right 2/26/2020    Procedure: HEMODIALYSIS CATHETER INSERTION;   Surgeon: Dave Chavarria DO;  Location:  [facility] HYBRID OR 12;  Service: Vascular;  Laterality: Right;   • ARLIN PROCEDURE     • TOTAL KNEE ARTHROPLASTY Left    • VARICOSE VEIN SURGERY Right 7/11/2018    Procedure: RIGHT SAPHENOUS VEIN RADIO FREQUENCY ABLATION;  Surgeon: Dave Chavarria DO;  Location:  [facility] HYBRID OR 12;  Service: Vascular     Social History     Socioeconomic History   • Marital status:      Spouse name: Not on file   • Number of children: Not on file   • Years of education: Not on file   • Highest education level: Not on file   Tobacco Use   • Smoking status: Former Smoker     Packs/day: 1.00     Years: 35.00     Pack years: 35.00     Types: Cigarettes     Start date: 1967     Last attempt to quit: 2010     Years since quitting: 10.1   • Smokeless tobacco: Never Used   Substance and Sexual Activity   • Alcohol use: Not Currently     Comment: quit 2008   • Drug use: Never   • Sexual activity: Defer     Family History   Problem Relation Age of Onset   • Cancer Father    • Heart disease Mother    • Diabetes Mother    • Sleep apnea Mother    • Hypertension Mother    • Coronary artery disease Other    • Heart disease Brother    • Kidney failure Brother    • Diabetes Brother    • Lung cancer Brother        Review of Systems   Constitution: Positive for malaise/fatigue. Negative for chills, diaphoresis and fever.   HENT: Negative for nosebleeds.    Eyes: Negative for visual disturbance.   Cardiovascular: Positive for dyspnea on exertion and leg swelling. Negative for chest pain, claudication, cyanosis, irregular heartbeat, near-syncope, orthopnea, palpitations, paroxysmal nocturnal dyspnea and syncope.   Respiratory: Negative for cough, hemoptysis, shortness of breath, sputum production and wheezing.    Hematologic/Lymphatic: Negative for bleeding problem.   Skin: Negative for color change and flushing.   Musculoskeletal: Negative for falls.   Gastrointestinal: Negative for bloating, abdominal  pain, hematemesis, hematochezia, melena, nausea and vomiting.   Genitourinary: Negative for hematuria.   Neurological: Negative for dizziness, light-headedness and weakness.   Psychiatric/Behavioral: Negative for altered mental status.         ECG 12 Lead  Date/Time: 3/11/2020 3:02 PM  Performed by: Sisi Rutledge APRN  Authorized by: Sisi Rutledge APRN   Comparison: compared with previous ECG from 2/22/2020  Similar to previous ECG  Comparison to previous ECG: V-paced intermittently with ventricular bigeminy   Rhythm: sinus rhythm and paced  BPM: 67                 Objective:     Physical Exam   Constitutional: He is oriented to person, place, and time. He appears well-developed and well-nourished. No distress.   HENT:   Head: Normocephalic and atraumatic.   Eyes: Pupils are equal, round, and reactive to light.   Neck: Normal range of motion. Neck supple. No JVD present. No thyromegaly present.   Cardiovascular: Normal rate, regular rhythm, normal heart sounds and intact distal pulses. Exam reveals no gallop and no friction rub.   No murmur heard.  Pulmonary/Chest: Effort normal. No respiratory distress. He has no wheezes. He has rales (minimal faint rales bases ). He exhibits no tenderness.   Abdominal: Soft. Bowel sounds are normal. He exhibits distension. There is no tenderness.   Musculoskeletal: Normal range of motion. He exhibits edema (4+ BLE edema ).   Neurological: He is alert and oriented to person, place, and time. No cranial nerve deficit.   Skin: Skin is warm and dry. He is not diaphoretic.   Psychiatric: He has a normal mood and affect. His behavior is normal.     Echo Review:   2/21/2020 echo:  · The left ventricular cavity is mild-to-moderately dilated.  · Right ventricular cavity is moderately dilated.  · Moderately reduced right ventricular systolic function noted.  · Left atrial cavity size is dilated.  · Mild mitral valve regurgitation is present  · Mild tricuspid valve regurgitation is  present.    Lab Results   Component Value Date    GLUCOSE 125 (H) 02/28/2020    CALCIUM 9.1 02/28/2020     02/28/2020    K 3.2 (L) 02/28/2020    CO2 27.0 02/28/2020    CL 98 02/28/2020    BUN 74 (H) 02/28/2020    CREATININE 2.95 (H) 02/28/2020    EGFRIFAFRI  02/25/2020      Comment:      <15 Indicative of kidney failure.    EGFRIFNONA 21 (L) 02/28/2020    BCR 25.1 (H) 02/28/2020    ANIONGAP 13.0 02/28/2020     Lab Results   Component Value Date    CHOL 91 02/10/2020    TRIG 93 02/10/2020    HDL 38 (L) 02/10/2020    LDL 34 02/10/2020           Assessment:          Diagnosis Plan   1. Coronary artery disease involving native coronary artery of native heart without angina pectoris  Stable. No angina     Non obstructive disease on most recent cath 12/2019 in [place]    PCI with MELISA to proximal LAD 12/2018 for NSTEMI     PCI to LAD 2011    Of note, he has a history of Plavix resistance   He is now on aspirin only due to recent GI bleed      2. Chronic systolic congestive heart failure    Stage C, Class III. Overall he appears hemodynamically stable, but persistently volume overloaded as evidenced by significant peripheral edema -now managed with HD and bumex per nephrology        3. S/P implantation of automatic cardioverter/defibrillator (AICD)  Now has BIV ICD as of 2/21/2020 (with 3 new leads); atrial lead was replaced again 2/22/2020     4. EMMANUEL on CKD stage IV/cardiorenal disease  Followed by Dr. Trevino   Now on HD Tues, Thurs, Sat      5. Venous insufficiency    Followed by Dr. Chavarria.      6. PVD (peripheral vascular disease)  Carotid disease followed by Dr. Chavarria    He is s/p right iliac stents x 2 per Dr. Mckeon following a dissection 12/2018      7. Essential hypertension  Controlled ; Entresto stopped and coreg dose reduced by half during recent hospitalization due to hypotension and EMMANUEL      8. Left bundle branch block - chronic    9. Non sustained ventricular tachycardia - followed by Dr. Cantu as  well. Now back on amiodarone 200 mg BID as of 2/24/2020 for NSVT treated with ATP x 2 on 2/23/2020- no further episodes per today's interrogation, but he does have a high ventricular ectopy burden.    10. COPD - followed by pulmonology     11. Hyperlipidemia - continue statin, LDL controlled at 34 per 2/2020 lipid panel     BIV ICD interrogation today reviewed with Ekaterina Rocha, device clinic RN:  8% atrial pacing, 34% RV pacing, 72% LV pacing , frequent PVCs, no episodes ATP or shocks      Plan:       As noted above   Close follow up with Dr. Trevino, continue HD 3x per week at his direction   Continue current medical therapy including amiodarone, coreg, aspirin, atorvastatin, and bumex at current doses   Will hold off on resuming ACEi/ARB or increasing beta blocker at this time due this his renal disease and intermittent hypotension   Keep appt with Dr. Cantu tomorrow   Follow up 2-3 weeks, sooner with new or worsening symptoms to concerns ; Keep device clinic follow up at that time as well     Reviewed signs and symptoms of CHF and what to report with the patient. Patient instructed to restrict sodium and weigh daily. Report weight gain of greater than 2 lbs overnight or 5 lbs in 1 week. Pt verbalized understanding of instructions and plan of care.

## 2020-03-11 NOTE — PROGRESS NOTES
Bi-V AICD Evaluation Report  IN OFFICE INTERROGATION    March 11, 2020    Primary Cardiologist: Mike  : Guidant Model: Momentum X4 CRT-D G138  Implant date: 2/21/2020, atrial lead revision 2/22/2020    Reason for evaluation: new implant follow up  Indication for AICD: congestive heart failure    Measurements  Atrial sensing:  P wave: 1.5 mV  Atrial threshold: 1 V @ 0.4 ms  Atrial lead impedance: 714 ohms  Ventricular sensing:  R wave: 5 mV  RV Threshold:  0.9V @ 0.4 ms  RV Impedance:  692 ohms  Shock impedance:  RV 34 ohms  LV Threshold:  0.7V @ 0.4ms  LV Impedance:  LVa 515 ohms, LVb 708 ohms    Diagnostic Data  Atrial paced: 8 %  Ventricular paced: RV 34%, LV 72%    Episodes recorded since 2/29/2020:  No episodes, ATP, or shocks. Frequent PVCs noted during interrogation.    Incision:  Dried blood present on site and some surgical adhesive still present.  Well approximated without erythema, edema, warmth, open areas, or new drainage of any kind.      Home Monitor:  Functioning.    Battery status: satisfactory   Estimated 11 years remaining    Final Parameters  Mode: DDD     Lower rate: 60 bpm   Upper rate: 130 bpm  AV Delay: 210 ms paced    140 ms sensed   Atrial - Amplitude: 3.5 V   Pulse width: 0.4 ms   Sensitivity: 0.15 mV   Ventricular:  RV Amplitude: 3.5 V @ 0.4 ms   Sensitivity: 0.6 mV            LV Amplitude:  3.5V @ 0.4ms  Changes made: No changes made.  Conclusions: normal AICD function, no therapy delivered, stable pacing and sensing thresholds and adequate battery reserve    Follow up: 4/15/2020 in office, remotely via latitude

## 2020-03-11 NOTE — OUTREACH NOTE
Medical Week 2 Survey      Responses   [facility] patient discharged from?  [place]   Does the patient have one of the following disease processes/diagnoses(primary or secondary)?  Other   Week 2 attempt successful?  Yes   Call start time  0859   Call end time  0901   Person spoke with today (if not patient) and relationship  llara-daughter   Medication alerts for this patient  Bumex, Pacerone    Meds reviewed with patient/caregiver?  Yes   Is the patient having any side effects they believe may be caused by any medication additions or changes?  No   Does the patient have all medications ordered at discharge?  Yes   Is the patient taking all medications as directed (includes completed medication regime)?  Yes   Comments regarding appointments  HEMODIALYSIS CATHETER INSERTION and Bi V upgrade this hospital stay    Does the patient have a primary care provider?   Yes   Does the patient have an appointment with their PCP within 7 days of discharge?  Greater than 7 days   Comments regarding PCP  Dr Perry/ PCP   What is preventing the patient from scheduling follow up appointments within 7 days of discharge?  Earlier appointment not available   Nursing Interventions  Verified appointment date/time/provider   Has the patient kept scheduled appointments due by today?  Yes   Did the patient receive a copy of their discharge instructions?  Yes   Nursing interventions  Reviewed instructions with patient   What is the patient's perception of their health status since discharge?  Improving   Is the patient/caregiver able to teach back signs and symptoms related to disease process for when to call PCP?  Yes   Is the patient/caregiver able to teach back signs and symptoms related to disease process for when to call 911?  Yes   Is the patient/caregiver able to teach back the hierarchy of who to call/visit for symptoms/problems? PCP, Specialist, Home health nurse, Urgent Care, ED, 911  Yes   Additional teach back comments  He  is getting dialysis.    Week 2 Call Completed?  Yes   Graduated  Yes   Did the patient feel the follow up calls were helpful during their recovery period?  No   Was the number of calls appropriate?  Yes   Does the patient have an Advance Directive or Living Will?  Yes          Sisi Jacobsen RN

## 2020-03-25 NOTE — PROGRESS NOTES
[employer] HeartLogic Heart Failure Index  Remote/Latitude      March 25, 2020    Primary Cardiologist: Mike  : Guidant Model: Momentum X4 CRT-D G138  Implant date: 2/21/2020    Reason for evaluation: monthly HeartLogic check  Indication for AICD: congestive heart failure    HeartLogic Heart Failure Index:  0 on 3/24/2020    Contributing Factors: N/A    Patient Symptom Check: N/A    Follow up: 1 month

## 2020-03-26 PROBLEM — J31.0 CHRONIC RHINITIS: Status: ACTIVE | Noted: 2020-01-01

## 2020-03-26 NOTE — PROGRESS NOTES
"03/26/2020      Alli Perry MD  [address] [facility] [address]        Jadon Flynn  1950    Chief Complaint   Patient presents with   • Follow-up     PermCath Removal. patient denies any stroke like symptoms.        Dear Alli Perry MD:    HPI     I had the pleasure of seeing you patient in the office today for follow up.  As you recall, the patient is a 69 y.o. male who we are currently following for lower extremity PAD and carotid occlusive disease.  He was scheduled for a recent appointment however was canceled due to him being hospitalized.  He was seen while hospitalized and had increasing shortness of breath weight gain, and swelling.  His kidney function significantly declined and he required permacath placement.  He had a right internal jugular permacath placed on 2/26/2020 by Dr. Chavarria.  Nidia Schneider called today saying he is finished with dialysis and no longer needs PermCath.  He is here for removal.    Review of Systems   Constitutional: Negative.    HENT: Negative.    Eyes: Negative.    Respiratory: Negative.    Cardiovascular: Negative.    Gastrointestinal: Negative.    Endocrine: Negative.    Genitourinary: Negative.    Musculoskeletal: Negative.    Skin: Negative.    Allergic/Immunologic: Negative.    Neurological: Negative.    Hematological: Negative.    Psychiatric/Behavioral: Negative.    All other systems reviewed and are negative.      /68 (BP Location: Left arm, Patient Position: Sitting, Cuff Size: Adult)   Ht 190.5 cm (75\")   Wt 103 kg (226 lb)   BMI 28.25 kg/m²   Physical Exam   Constitutional: He is oriented to person, place, and time. He appears well-developed and well-nourished.   HENT:   Head: Normocephalic and atraumatic.   Eyes: Pupils are equal, round, and reactive to light. No scleral icterus.   Neck: Normal range of motion. Neck supple. No thyromegaly present.   Cardiovascular: Normal rate, regular rhythm, normal heart sounds and intact distal " pulses.   Right internal jugular PermCath   Pulmonary/Chest: Effort normal and breath sounds normal.   Abdominal: Soft. Bowel sounds are normal.   Musculoskeletal: Normal range of motion.   Neurological: He is alert and oriented to person, place, and time.   Skin: Skin is warm and dry.   Psychiatric: He has a normal mood and affect. His behavior is normal. Judgment and thought content normal.   Nursing note and vitals reviewed.    Patient Active Problem List   Diagnosis   • PVD (peripheral vascular disease) (CMS/[facility])   • Coronary artery disease involving native coronary artery of native heart without angina pectoris   • S/P implantation of automatic cardioverter/defibrillator (AICD)   • Ischemic cardiomyopathy   • COPD, group B, by GOLD 2017 classification (CMS/[facility])   • Acute on chronic systolic heart failure (CMS/[facility])   • Severe pulmonary hypertension (CMS/[facility])   • Acute renal failure superimposed on stage 4 chronic kidney disease (CMS/[facility])   • Cardiorenal syndrome   • AICD lead displacement   • Chronic rhinitis         ICD-10-CM ICD-9-CM   1. Stage 4 chronic kidney disease (CMS/[facility]) N18.4 585.4   2. S/P hemodialysis catheter insertion (CMS/[facility]) Z99.2 V45.11           PLAN: After thoroughly evaluating Jadon Flynn, I believe the best course of action is to proceed with PermCath removal.  After the patient was correctly identified, the patient was placed in the supine position on his exam table.  The stitches of the catheter were removed.  The catheter was easily removed completely intact and direct pressure was held over the right internal jugular vein for hemostasis for 15 minutes.  Sterile dressings were applied.  The patient tolerated the procedure well.  We will get him rescheduled in a couple months for his repeat noninvasive test including a carotid duplex and ABIs after coronavirus limitations are lifted.  The patient is to continue taking their medications as previously discussed.   This was all  discussed in full with complete understanding.  Thank you for allowing me to participate in the care of your patient.  Please do not hesitate to call with any questions or concerns.  We will keep you aware of any further encounters with Jadon Flynn.      Sincerely Yours,      OLI Barnett

## 2020-03-30 PROBLEM — N18.4 STAGE 4 CHRONIC KIDNEY DISEASE (HCC): Status: ACTIVE | Noted: 2020-01-01

## 2020-03-30 NOTE — TELEPHONE ENCOUNTER
Spoke with patient and advised of upcoming procedure.  Patient pre work will be done over the phone.  Patient procedure is scheduled for 4/1/2020 at 515 am.  Patient advised of location time and prep.  Patient expressed understanding for all that was discussed. Patient advised of no visitors protocol.

## 2020-03-30 NOTE — PROGRESS NOTES
I have reviewed the notes, assessments, and/or procedures performed by  Ekaterina Rocha RN, I concur with her  documentation of Jadon Flynn.

## 2020-04-01 NOTE — ANESTHESIA PREPROCEDURE EVALUATION
Anesthesia Evaluation     Patient summary reviewed   no history of anesthetic complications:  NPO Solid Status: > 8 hours  NPO Liquid Status: > 8 hours           Airway   Mallampati: I  TM distance: >3 FB  Neck ROM: full  No difficulty expected  Dental - normal exam     Pulmonary    (+) COPD,   (-) sleep apnea  Cardiovascular     Patient on routine beta blocker and Beta blocker given within 24 hours of surgery    (+) pacemaker (Presented for ICD/pacemaker replacement on 2/21, taken abck to cath lab 2/22 after lead dislodgement- treadalong) pacemaker, ICD, hypertension, valvular problems/murmurs, past MI , CAD, cardiac stents CHF (calculated EF on TTE 31.5%) Systolic <55%, PVD, hyperlipidemia,     ROS comment: Echo:  ·The left ventricular cavity is mild-to-moderately dilated.  ·Right ventricular cavity is moderately dilated.  ·Moderately reduced right ventricular systolic function noted.  ·Left atrial cavity size is dilated.  ·Mild mitral valve regurgitation is present  ·Mild tricuspid valve regurgitation is present.    Neuro/Psych  (+) CVA,     (-) seizures, TIA  GI/Hepatic/Renal/Endo    (+)  GERD,  renal disease ARF,   (-) liver disease    Musculoskeletal     Abdominal    Substance History      OB/GYN          Other   blood dyscrasia anemia,         Phys Exam Other: Left upper recent scar with dermabond from ICD implant site                  Anesthesia Plan    ASA 4     MAC   (Place magnet during procedure. Interrogate device after surgery. )  intravenous induction     Anesthetic plan, all risks, benefits, and alternatives have been provided, discussed and informed consent has been obtained with: patient.

## 2020-04-01 NOTE — ANESTHESIA POSTPROCEDURE EVALUATION
"Patient: Jadon Flynn    Procedure Summary     Date:  04/01/20 Room / Location:  [facility] OR  /  [facility] HYBRID OR 12    Anesthesia Start:  0750 Anesthesia Stop:  0818    Procedure:  HEMODIALYSIS CATHETER INSERTION (Right Neck) Diagnosis:       Stage 4 chronic kidney disease (CMS/HCC)      (Stage 4 chronic kidney disease (CMS/HCC) [N18.4])    Surgeon:  Dave Chavarria DO Provider:  Tracy Walden CRNA    Anesthesia Type:  MAC ASA Status:  4          Anesthesia Type: MAC    Vitals  Vitals Value Taken Time   /67 4/1/2020  8:54 AM   Temp 97.8 °F (36.6 °C) 4/1/2020  8:50 AM   Pulse 61 4/1/2020  8:54 AM   Resp 14 4/1/2020  8:50 AM   SpO2 95 % 4/1/2020  8:54 AM   Vitals shown include unvalidated device data.        Post Anesthesia Care and Evaluation    Patient location during evaluation: PACU  Patient participation: complete - patient participated  Level of consciousness: awake and alert  Pain management: adequate  Airway patency: patent  Anesthetic complications: No anesthetic complications    Cardiovascular status: acceptable  Respiratory status: acceptable  Hydration status: acceptable    Comments: Blood pressure 111/60, pulse 64, temperature 97.8 °F (36.6 °C), temperature source Temporal, resp. rate 16, height 186 cm (73.23\"), weight 104 kg (229 lb 0.9 oz), SpO2 98 %.    Pt discharged from PACU based on mainor score >8      "

## 2020-04-01 NOTE — OP NOTE
Jadon Flynn  4/1/2020     PREOPERATIVE DIAGNOSIS: Stage 4 chronic kidney disease (CMS/HCC) [N18.4]     POSTOPERATIVE DIAGNOSIS: Post-Op Diagnosis Codes:     * Stage 4 chronic kidney disease (CMS/HCC) [N18.4]     PROCEDURE PERFORMED:   1.  Ultrasound-guided cannulation of the right internal jugular vein  2.  Placement of a 14.5 x 19 cm tunneled PermCath  3.  Radiographic supervision and interpretation     SURGEON: Dave Chavarria DO      ANESTHESIA: MAC    PREPARATION: Routine.    STAFF: Circulator: Celestina Romero RN; Ale Liang RN  Scrub Person: Steve Parry; Chen Hicks    Estimated Blood Loss: 10 mL    SPECIMENS: None    COMPLICATIONS: None    INDICATIONS: Jadon Flynn is a 69 y.o. male who we are currently following for lower extremity PAD and carotid occlusive disease.  He was scheduled for a recent appointment however was canceled due to him being hospitalized.  He was seen while hospitalized and had increasing shortness of breath weight gain, and swelling.  His kidney function significantly declined and he required permacath placement.  He had a right internal jugular permacath placed on 2/26/2020 by Dr. Chavarria.  Nidia Schneider called today saying he is finished with dialysis and no longer needs PermCath.  He is here for removal. The indications, risks, and possible complications of the procedure were explained to the patient, who voiced understanding and wished to proceed with surgery.     PROCEDURE IN DETAIL:     The patient was taken to the operating room and placed on the operating table in a supine position. After Mac anesthesia was obtained, the right neck and chest was prepped and draped in a sterile manner.  Under ultrasound guidance and using a micropuncture technique the right internal jugular vein was cannulated and a micro-sheath was placed.  The J-wire was advanced on into the IVC under fluoroscopic guidance.  Next, 10 mL of 0.5% Marcaine with epinephrine was  used to infiltrate subcutaneous tract.  2 small stab incisions were made with the 11 blade.  The tunneling device attached to the catheter was then tunneled in a subcutaneous manner up through the neck insertion site.  Next, serial dilatation was then made of the internal jugular vein under fluoroscopic guidance.  Lastly, the tear-away sheath was placed and the inner dilator and wire were removed.  The catheter was placed through the tear-away sheath with the tip ending in the right atrial/SVC junction.  There were no kinks in the catheter and both ports had good blood return.  Each port was flushed with heparinized saline and straight heparin 1000 units per mL.  The caps were applied.  The catheter was secured to skin with a 2-0 nylon.  The neck insertion site was closed with a 4-0 Monocryl in a subcuticular fashion.  The wounds were then cleaned.  Sterile dressings were applied. The patient tolerated the procedure well. Sponge and needle counts were correct. The patient was then awakened in the operating room and taken to the recovery room in good condition.    Dave Chavarria DO  4/1/2020  8:14 AM    CC: OLI Mason

## 2020-04-01 NOTE — H&P
Jadon NBA Gee  [id number]  [id number]  [facility] OR/NONE  Dave Chavarria DO  4/1/2020    CC: Chronic renal insufficiency    HPI:  I had the pleasure of seeing you patient in the office today for follow up.  As you recall, the patient is a 69 y.o. male who we are currently following for lower extremity PAD and carotid occlusive disease.  He was scheduled for a recent appointment however was canceled due to him being hospitalized.  He was seen while hospitalized and had increasing shortness of breath weight gain, and swelling.  His kidney function significantly declined and he required permacath placement.  He had a right internal jugular permacath placed on 2/26/2020 by Dr. Chavarria.  Nidia Schneider called today saying he is finished with dialysis and no longer needs PermCath.  He is here for removal.    Past Medical History:   Diagnosis Date   • Asymptomatic bilateral carotid artery stenosis    • CAD (coronary artery disease) 12/07/2016   • CAD in native artery      2009 stents   • Carotid artery disease (CMS/[facility])    • CHF (congestive heart failure) (CMS/[facility]) 12/07/2016   • Chronic combined systolic and diastolic CHF (congestive heart failure) (CMS/[facility])     echo 5/2013 EF 35-40%   • Chronic rhinitis 3/26/2020   • Chronic systolic CHF (congestive heart failure), NYHA class 2 (CMS/[facility])    • CKD (chronic kidney disease) 12/07/2016   • CKD (chronic kidney disease), stage III (CMS/[facility])    • COPD, group B, by GOLD 2017 classification (CMS/[facility]) 11/20/2018   • Essential hypertension    • Gout    • HLD (hyperlipidemia) 12/07/2016   • HTN (hypertension) 12/07/2016   • Hyperkalemia    • Hyperlipidemia, unspecified    • ICAO (internal carotid artery occlusion), right 3/27/2018   • Ischemic heart disease 12/07/2016   • Ischemic heart disease    • Non-sustained ventricular tachycardia (CMS/[facility]) 1/18/2020   • NSTEMI (non-ST elevated myocardial infarction) (CMS/[facility]) 12/28/2018   • Peripheral vascular disease (CMS/[facility])    •  Pinched nerve in neck    • PVD (peripheral vascular disease) (CMS/[facility]) 12/07/2016   • S/P implantation of automatic cardioverter/defibrillator (AICD) 12/07/2016   • S/P implantation of automatic cardioverter/defibrillator (AICD)    • Stroke (CMS/[facility])    • Ventricular tachycardia (CMS/[facility]) 10/30/2018       Past Surgical History:   Procedure Laterality Date   • CARDIAC CATHETERIZATION N/A 12/28/2018    Procedure: Coronary angiography;  Surgeon: John Mckeon MD;  Location:  [facility] CATH INVASIVE LOCATION;  Service: Cardiology   • CARDIAC DEFIBRILLATOR PLACEMENT     • CARDIAC ELECTROPHYSIOLOGY PROCEDURE Left 2/21/2020    Procedure: Bi V upgrade;  Surgeon: John Mckeon MD;  Location:  [facility] CATH INVASIVE LOCATION;  Service: Cardiology;  Laterality: Left;   • CARDIAC ELECTROPHYSIOLOGY PROCEDURE N/A 2/22/2020    Procedure: Lead Revision;  Surgeon: John Mckeon MD;  Location:  [facility] CATH INVASIVE LOCATION;  Service: Cardiology;  Laterality: N/A;   • CAROTID ENDARTERECTOMY     • CARPAL TUNNEL RELEASE Right    • CORONARY STENT PLACEMENT      x2   • FINGER SURGERY Right     tendon repair    • HYDROCELE EXCISION / REPAIR     • ILIAC ARTERY STENT     • INSERT / REPLACE / REMOVE PACEMAKER  2011   • INSERTION HEMODIALYSIS CATHETER Right 2/26/2020    Procedure: HEMODIALYSIS CATHETER INSERTION;  Surgeon: Dave Chavarria DO;  Location: [facility] HYBRID OR 12;  Service: Vascular;  Laterality: Right;   • ARLIN PROCEDURE     • TOTAL KNEE ARTHROPLASTY Left    • VARICOSE VEIN SURGERY Right 7/11/2018    Procedure: RIGHT SAPHENOUS VEIN RADIO FREQUENCY ABLATION;  Surgeon: Dave Chavarria DO;  Location: [facility] HYBRID OR 12;  Service: Vascular       Family History   Problem Relation Age of Onset   • Cancer Father    • Heart disease Mother    • Diabetes Mother    • Sleep apnea Mother    • Hypertension Mother    • Coronary artery disease Other    • Heart disease Brother    • Kidney failure Brother    • Diabetes Brother    • Lung cancer  Brother        Social History     Socioeconomic History   • Marital status:      Spouse name: Not on file   • Number of children: Not on file   • Years of education: Not on file   • Highest education level: Not on file   Tobacco Use   • Smoking status: Former Smoker     Packs/day: 1.00     Years: 35.00     Pack years: 35.00     Types: Cigarettes     Start date: 1967     Last attempt to quit: 2010     Years since quitting: 10.2   • Smokeless tobacco: Never Used   Substance and Sexual Activity   • Alcohol use: Not Currently     Comment: quit 2008   • Drug use: Never   • Sexual activity: Defer       No Known Allergies    Medications Prior to Admission   Medication Sig Dispense Refill Last Dose   • allopurinol (ZYLOPRIM) 100 MG tablet Take 100 mg by mouth 2 (Two) Times a Day.   3/31/2020 at 1800   • amiodarone (PACERONE) 200 MG tablet Take 1 tablet by mouth 2 (Two) Times a Day. 180 tablet 1 3/31/2020 at 1800   • aspirin 81 MG EC tablet Take 81 mg by mouth Daily.   3/31/2020 at 0800   • atorvastatin (LIPITOR) 40 MG tablet Take 40 mg by mouth Daily.   3/31/2020 at 2130   • bumetanide (BUMEX) 1 MG tablet Take 1 tablet by mouth 2 (Two) Times a Day. 180 tablet 3 3/31/2020 at 1800   • calcitriol (ROCALTROL) 0.25 MCG capsule Take 0.25 mcg by mouth Daily.   3/31/2020 at 0800   • carvedilol (COREG) 12.5 MG tablet Take 1 tablet by mouth 2 (Two) Times a Day With Meals. 180 tablet 3 4/1/2020 at 0430   • HYDROcodone-acetaminophen (NORCO) 7.5-325 MG per tablet Take 1 tablet by mouth Every 8 (Eight) Hours As Needed for Moderate Pain .  0 4/1/2020 at 0430   • metOLazone (ZAROXOLYN) 2.5 MG tablet Take 2.5 mg by mouth 3 (Three) Times a Week. TAKE ON Monday, Wednesday, FRIDAY   3/30/2020   • Multiple Vitamins-Minerals (ONE-A-DAY MENS 50+ ADVANTAGE) tablet Take 1 tablet by mouth Daily.   3/31/2020 at 0800   • pantoprazole (PROTONIX) 40 MG EC tablet Take 40 mg by mouth 2 (Two) Times a Day.   3/31/2020 at 2130       Review of Systems  "  Constitutional: Negative.    HENT: Negative.    Eyes: Negative.    Respiratory: Negative.    Cardiovascular: Negative.    Gastrointestinal: Negative.    Endocrine: Negative.    Genitourinary: Negative.    Musculoskeletal: Negative.    Skin: Negative.    Allergic/Immunologic: Negative.    Neurological: Negative.    Hematological: Negative.    Psychiatric/Behavioral: Negative.    All other systems reviewed and are negative.      /57 (BP Location: Left arm)   Pulse 63   Temp 97.4 °F (36.3 °C) (Temporal)   Resp 16   Ht 186 cm (73.23\")   Wt 104 kg (229 lb 0.9 oz)   SpO2 98%   BMI 30.03 kg/m²   Physical Exam   Constitutional: He is oriented to person, place, and time. He appears well-developed and well-nourished.   HENT:   Head: Normocephalic and atraumatic.   Eyes: Pupils are equal, round, and reactive to light. No scleral icterus.   Neck: Neck supple. No JVD present. Carotid bruit is not present. No thyromegaly present.   Cardiovascular: Normal rate, regular rhythm and normal heart sounds.   Pulses:       Carotid pulses are 2+ on the right side, and 2+ on the left side.       Femoral pulses are 2+ on the right side, and 2+ on the left side.       Popliteal pulses are 2+ on the right side, and 2+ on the left side.        Dorsalis pedis pulses are 2+ on the right side, and 2+ on the left side.        Posterior tibial pulses are 2+ on the right side, and 2+ on the left side.   Pulmonary/Chest: Effort normal and breath sounds normal.   Abdominal: Soft. Bowel sounds are normal. He exhibits no distension, no abdominal bruit and no mass. There is no hepatosplenomegaly. There is no tenderness.   Musculoskeletal: Normal range of motion. He exhibits no edema.   Lymphadenopathy:     He has no cervical adenopathy.   Neurological: He is alert and oriented to person, place, and time. He has normal strength. No cranial nerve deficit or sensory deficit.   Skin: Skin is warm, dry and intact.   Psychiatric: He has a normal " mood and affect.   Nursing note and vitals reviewed.      Lab Results (last 7 days)     Procedure Component Value Units Date/Time    Basic Metabolic Panel [[id number]]  (Abnormal) Collected:  04/01/20 0610    Specimen:  Blood Updated:  04/01/20 0636     Glucose 105 mg/dL      BUN 99 mg/dL      Creatinine 4.18 mg/dL      Sodium 141 mmol/L      Potassium 2.9 mmol/L      Chloride 97 mmol/L      CO2 28.0 mmol/L      Calcium 9.3 mg/dL      eGFR   Amer --     Comment: <15 Indicative of kidney failure.        eGFR Non African Amer 14 mL/min/1.73      Comment: <15 Indicative of kidney failure.        BUN/Creatinine Ratio 23.7     Anion Gap 16.0 mmol/L     Narrative:       GFR Normal >60  Chronic Kidney Disease <60  Kidney Failure <15      CBC (No Diff) [[id number]]  (Abnormal) Collected:  04/01/20 0610    Specimen:  Blood Updated:  04/01/20 0622     WBC 8.59 10*3/mm3      RBC 2.79 10*6/mm3      Hemoglobin 9.1 g/dL      Hematocrit 27.5 %      MCV 98.6 fL      MCH 32.6 pg      MCHC 33.1 g/dL      RDW 22.6 %      RDW-SD 81.8 fl      MPV 12.8 fL      Platelets 251 10*3/mm3           Imaging Results (Last 7 Days)     ** No results found for the last 168 hours. **          Impression:  1. Stage 4 chronic kidney disease (CMS/HCC)         Plan: After thoroughly evaluate the patient I believe the best course of action would be to proceed with PermCath placement in the operating room.  The PermCath was removed but the patient's lab work never improved.  He will need to have the catheter replaced.The risks and benefits were explained at great length to the patient which include but are not limited to bleeding, infection, vessel damage, nerve damage and pneumothorax. The patient understands the risks and wishes for me to proceed.     Dave Chavarria DO  4/1/2020  7:50 AM

## 2020-04-01 NOTE — DISCHARGE INSTRUCTIONS

## 2020-04-06 NOTE — PROGRESS NOTES
Bi-V AICD Evaluation Report  REMOTE/LATITUDE    April 6, 2020     Interrogation Date:  4/5/2020    Primary Cardiologist: Mike  : Guidant Model: Momentum X4 CRT-D G138  Implant date: 2/21/2020, atrial lead revision 2/22/2020    Reason for evaluation: remote transmission with AICD shock  Indication for AICD: congestive heart failure    Measurements  Atrial sensing:  P wave: 1.5 mV  Atrial threshold: 0.6 V @ 0.4 ms  Atrial lead impedance: 719 ohms  Ventricular sensing:  R wave: N/R mV  RV Threshold:  3.4V @ 0.4 ms  RV Impedance:  762 ohms  Shock impedance:  RV 33 ohms  LV Threshold:  0.7V @ 0.4ms  LV Impedance:  512 ohms    Diagnostic Data  Atrial paced: 15 %  Ventricular paced: RV 17%, LV 84%    Episodes recorded since 3/11/2020:  4/4/2020:  VT at 188 bpm that appears to have been accelerated to VF by 1 sequence of ATP.  Treated successfully with 41J shock.      Note:  Per device rep, Nidia Luther, ATP should potentially be turned off since it appears to have accelerated the rhythm.  Rep recommends to have Dr. Cantu assess and give recommendations.  Report faxed to Dr. Cantu's RN.      Battery status: satisfactory   Estimated 9.5 years remaining    Final Parameters  Mode: DDD     Lower rate: 60 bpm   Upper rate: 130 bpm  AV Delay: 210 ms paced    140 ms sensed   Atrial - Amplitude: 3.5 V   Pulse width: 0.4 ms   Sensitivity: 0.15 mV   Ventricular:  RV Amplitude: 3.5 V @ 0.4 ms   Sensitivity: 0.6 mV            LV Amplitude:  3.5V @ 0.4ms  Changes made: No changes made.  Conclusions: normal AICD function, adequate battery reserve and ATP/Shock treated VT/VF    Follow up: 4/15/2020 in office, remotely via latitude

## 2020-04-15 NOTE — PROGRESS NOTES
You have chosen to receive care through a telephone visit. Do you consent to use a telephone visit for your medical care today? Yes     This visit has been rescheduled as a phone visit to comply with patient safety concerns in accordance with CDC recommendations. Total time of discussion was 5 minutes.    Jadon Flynn is a 69 y.o. male who is followed for lower extremity PAD and carotid occlusive disease.  He did have a significant decline in kidney function and has been undergoing dialysis.  His permacath was removed at the direction of nephrology on 3/26/2020.  Nephrology did call back stating PermCath would need to be replaced.  He did undergo PermCath placement on 4/1/2020.  He is doing well and undergoing dialysis without difficulty.  He states dialysis has not told him whether this would need to be permanent at this time.  I did explain for him to contact us if he is in need of permanent access.  I will tentatively schedule him a six-month appointment as it is about time for his one-year follow-up with carotid duplex and ABIs.  He voices understanding.     Diagnosis Plan   1. Acute renal failure superimposed on stage 4 chronic kidney disease, unspecified acute renal failure type (CMS/HCC)     2. Bilateral carotid artery stenosis  US Carotid Bilateral   3. PAD (peripheral artery disease) (CMS/[facility])  US Ankle / Brachial Indices Extremity Complete        OLI Barnett  04/15/2020  11:31

## 2020-04-17 PROBLEM — I10 ESSENTIAL HYPERTENSION: Status: ACTIVE | Noted: 2020-01-01

## 2020-04-17 PROBLEM — I50.22 CHRONIC SYSTOLIC HEART FAILURE (HCC): Status: ACTIVE | Noted: 2020-01-01

## 2020-04-17 PROBLEM — I47.29 NSVT (NONSUSTAINED VENTRICULAR TACHYCARDIA) (HCC): Status: ACTIVE | Noted: 2020-01-01

## 2020-04-17 NOTE — PROGRESS NOTES
Subjective:     Encounter Date: 04/17/2020      Patient ID: Jadon Flynn is a 69 y.o. male with a history of systolic CHF with BIV ICD, NSVT, VT/VF with ICD shock on 4/4/2020, renal failure on hemodialysis on 2/27/2020, CAD (PCI to LAD in 2011, PCI with MELISA to proximal LAD 12/2018 for NSTEMI; Non obstructive disease on most recent cath 12/2019 in [place]-hx of plavix resistance and off Brilinta due to GI bleed), PVD, hypertension, chronic LBBB and hyperlipidemia.     Chief Complaint: follow up CAD and CHF  Congestive Heart Failure   Presents for follow-up visit. Pertinent negatives include no abdominal pain, chest pain, chest pressure, claudication, edema, fatigue, muscle weakness, near-syncope, nocturia, orthopnea, palpitations, paroxysmal nocturnal dyspnea, shortness of breath or unexpected weight change. The symptoms have been stable. His past medical history is significant for CAD. Compliance with total regimen is %. Compliance with diet is 51-75%. Compliance with exercise is 51-75%. Compliance with medications is %.   Coronary Artery Disease   Presents for follow-up visit. Symptoms include leg swelling. Pertinent negatives include no chest pain, chest pressure, chest tightness, dizziness, muscle weakness, palpitations, shortness of breath or weight gain. His past medical history is significant for CHF.     Most recently, the patient had an a hospital admission 2/20 to 2/28 for acute CHF and acute renal failure/cardiorenal syndrome and ultimately had to be started on hemodialysis on 2/27/20. He follows at the dialysis clinic in [place] and has HD Tuesday, Thursday, Saturday (last treatment was yesterday). During his hospitalization, he was taken off of Entresto due to EMMANUEL and hypotension and his coreg dose was cut in half as well. He was started on amiodarone 2/24/20 for more NSVT treated with ATP x 2 on 2/23. Also during that same hospitalization he underwent upgrade to a BIV ICD with 3 new  leads on 2/21. He was taken back to the lab for atrial lead repositioning on 2/22. He had an appt with Dr. Cantu 3/12/2020.   Patient was noted to have a run of VT at 188 bpm on 4/4/2020 he received ATP it accelerated to VF that was treated successfully with 41Jshock. The report was sent to Dr Cantu for review. He recommended ATP should potentially be turned off since it appears to have accelerated the rhythm. He also recommended to adjust VT zone to 10 seconds and VF zone to 5 seconds to see if patient would break on own.    Patient presents today for follow up on congestive heart failure. He states that he has been doing well overall. He is continuing to have hemodialysis on Tuesday, Thursday and Saturdays. He reports that he has continued to have persistent lower extremity edema. He states that they are wanting to move his chest permacath to a fistula in his arm to see if it will pull more fluid from his lower extremities. He states that he has been compliant his cardiac/renal diets as well as his 1L fluid restriction. He reports stable dyspnea on exertion. He denies any orthopnea, PND, palpitations, chest pain, syncope or near syncope. He reports blood pressure has remained controlled-ranging from 100-120/60-70's.     The following portions of the patient's history were reviewed and updated as appropriate: allergies, current medications, past family history, past medical history, past social history, past surgical history and problem list.    No Known Allergies    Current Outpatient Medications:   •  allopurinol (ZYLOPRIM) 100 MG tablet, Take 100 mg by mouth 2 (Two) Times a Day., Disp: , Rfl:   •  amiodarone (PACERONE) 200 MG tablet, Take 1 tablet by mouth 2 (Two) Times a Day., Disp: 180 tablet, Rfl: 1  •  aspirin 81 MG EC tablet, Take 81 mg by mouth Daily., Disp: , Rfl:   •  atorvastatin (LIPITOR) 40 MG tablet, Take 40 mg by mouth Daily., Disp: , Rfl:   •  bumetanide (BUMEX) 1 MG tablet, Take 1 tablet by mouth  2 (Two) Times a Day., Disp: 180 tablet, Rfl: 3  •  calcitriol (ROCALTROL) 0.25 MCG capsule, Take 0.25 mcg by mouth Daily., Disp: , Rfl:   •  carvedilol (COREG) 12.5 MG tablet, Take 1 tablet by mouth 2 (Two) Times a Day With Meals., Disp: 180 tablet, Rfl: 3  •  HYDROcodone-acetaminophen (NORCO) 7.5-325 MG per tablet, Take 1 tablet by mouth Every 8 (Eight) Hours As Needed for Moderate Pain ., Disp: , Rfl: 0  •  metOLazone (ZAROXOLYN) 2.5 MG tablet, Take 2.5 mg by mouth 3 (Three) Times a Week. TAKE ON Monday, Wednesday, FRIDAY, Disp: , Rfl:   •  Multiple Vitamins-Minerals (ONE-A-DAY MENS 50+ ADVANTAGE) tablet, Take 1 tablet by mouth Daily., Disp: , Rfl:   •  pantoprazole (PROTONIX) 40 MG EC tablet, Take 40 mg by mouth 2 (Two) Times a Day., Disp: , Rfl:   Past Medical History:   Diagnosis Date   • Asymptomatic bilateral carotid artery stenosis    • CAD (coronary artery disease) 12/07/2016   • CAD in native artery      2009 stents   • Carotid artery disease (CMS/[facility])    • CHF (congestive heart failure) (CMS/[facility]) 12/07/2016   • Chronic combined systolic and diastolic CHF (congestive heart failure) (CMS/[facility])     echo 5/2013 EF 35-40%   • Chronic rhinitis 3/26/2020   • Chronic systolic CHF (congestive heart failure), NYHA class 2 (CMS/[facility])    • CKD (chronic kidney disease) 12/07/2016   • CKD (chronic kidney disease), stage III (CMS/[facility])    • COPD, group B, by GOLD 2017 classification (CMS/[facility]) 11/20/2018   • Essential hypertension    • Gout    • HLD (hyperlipidemia) 12/07/2016   • HTN (hypertension) 12/07/2016   • Hyperkalemia    • Hyperlipidemia, unspecified    • ICAO (internal carotid artery occlusion), right 3/27/2018   • Ischemic heart disease 12/07/2016   • Ischemic heart disease    • Non-sustained ventricular tachycardia (CMS/[facility]) 1/18/2020   • NSTEMI (non-ST elevated myocardial infarction) (CMS/[facility]) 12/28/2018   • Peripheral vascular disease (CMS/HCC)    • Pinched nerve in neck    • PVD (peripheral vascular disease)  (CMS/[facility]) 12/07/2016   • S/P implantation of automatic cardioverter/defibrillator (AICD) 12/07/2016   • S/P implantation of automatic cardioverter/defibrillator (AICD)    • Stroke (CMS/[facility])    • Ventricular tachycardia (CMS/[facility]) 10/30/2018     Social History     Socioeconomic History   • Marital status:      Spouse name: Not on file   • Number of children: Not on file   • Years of education: Not on file   • Highest education level: Not on file   Tobacco Use   • Smoking status: Former Smoker     Packs/day: 1.00     Years: 35.00     Pack years: 35.00     Types: Cigarettes     Start date: 1967     Last attempt to quit: 2010     Years since quitting: 10.2   • Smokeless tobacco: Never Used   Substance and Sexual Activity   • Alcohol use: Not Currently     Comment: quit 2008   • Drug use: Never   • Sexual activity: Defer       Review of Systems   Constitution: Negative for decreased appetite, diaphoresis, fatigue, malaise/fatigue, night sweats, unexpected weight change and weight gain.   HENT: Negative for congestion and nosebleeds.    Eyes: Negative for visual disturbance.   Cardiovascular: Positive for dyspnea on exertion (unchanged) and leg swelling. Negative for chest pain, claudication, irregular heartbeat, near-syncope, orthopnea, palpitations, paroxysmal nocturnal dyspnea and syncope.   Respiratory: Negative for chest tightness, cough, shortness of breath and wheezing.    Hematologic/Lymphatic: Does not bruise/bleed easily.   Musculoskeletal: Negative for arthritis, joint pain and muscle weakness.   Gastrointestinal: Negative for abdominal pain and change in bowel habit.   Genitourinary: Negative for hematuria, nocturia and urgency.   Neurological: Negative for dizziness, headaches, light-headedness, loss of balance and weakness.   Psychiatric/Behavioral: Negative for altered mental status. The patient is not nervous/anxious.        Procedures       Objective:     Physical Exam   Constitutional: He is  "oriented to person, place, and time. He appears well-developed and well-nourished. No distress.   HENT:   Head: Normocephalic and atraumatic.   Nose: Nose normal.   Eyes: Pupils are equal, round, and reactive to light.   Neck: Normal range of motion. Neck supple. Carotid bruit is not present.   Cardiovascular: Normal rate, regular rhythm and normal heart sounds.   No murmur heard.  Pulmonary/Chest: Effort normal and breath sounds normal. No respiratory distress. He has no wheezes. He has no rales.   Abdominal: Soft. Normal appearance. He exhibits no distension.   Musculoskeletal: Normal range of motion. He exhibits edema (4+ BLE).   Neurological: He is alert and oriented to person, place, and time.   Skin: Skin is warm. No rash noted. No erythema.   Psychiatric: He has a normal mood and affect. His speech is normal and behavior is normal. Judgment and thought content normal.   Vitals reviewed.      /58   Pulse 64   Temp 98.1 °F (36.7 °C)   Ht 193 cm (76\")   Wt 106 kg (234 lb)   SpO2 98%   BMI 28.48 kg/m²     Lab Review:     AICD interrogation today: full report pending. A=pacing 16%; RV 20%, LV 87%. No episodes, No ATP or shocks       Bi-V AICD Evaluation Report  REMOTE/LATITUDE     April 6, 2020      Interrogation Date:  4/5/2020     Primary Cardiologist: Mike  : Guidant Model: Momentum X4 CRT-D G138  Implant date: 2/21/2020, atrial lead revision 2/22/2020     Reason for evaluation: remote transmission with AICD shock  Indication for AICD: congestive heart failure     Measurements  Atrial sensing:  P wave: 1.5 mV  Atrial threshold: 0.6 V @ 0.4 ms  Atrial lead impedance: 719 ohms  Ventricular sensing:  R wave: N/R mV  RV Threshold:  3.4V @ 0.4 ms  RV Impedance:  762 ohms  Shock impedance:  RV 33 ohms  LV Threshold:  0.7V @ 0.4ms  LV Impedance:  512 ohms     Diagnostic Data  Atrial paced: 15 %  Ventricular paced: RV 17%, LV 84%     Episodes recorded since 3/11/2020:  4/4/2020:  VT at 188 bpm " that appears to have been accelerated to VF by 1 sequence of ATP.  Treated successfully with 41J shock.       Note:  Per device rep, Nidia Luther, ATP should potentially be turned off since it appears to have accelerated the rhythm.  Rep recommends to have Dr. Cantu assess and give recommendations.  Report faxed to Dr. Cantu's RN.       Battery status: satisfactory   Estimated 9.5 years remaining     Final Parameters  Mode: DDD     Lower rate: 60 bpm   Upper rate: 130 bpm  AV Delay: 210 ms paced    140 ms sensed   Atrial - Amplitude: 3.5 V   Pulse width: 0.4 ms   Sensitivity: 0.15 mV   Ventricular:  RV Amplitude: 3.5 V @ 0.4 ms   Sensitivity: 0.6 mV                       LV Amplitude:  3.5V @ 0.4ms  Changes made: No changes made.  Conclusions: normal AICD function, adequate battery reserve and ATP/Shock treated VT/VF     Follow up: 4/15/2020 in office, remotely via latitude   I have personally reviewed past office notes, hospitalization notes, labs, ECHO reports and AICD interrogation  Assessment:          Diagnosis Plan   1. Coronary artery disease involving native coronary artery of native heart without angina pectoris     2. Chronic systolic heart failure (CMS/HCC)     3. S/P implantation of automatic cardioverter/defibrillator (AICD)     4. NSVT (nonsustained ventricular tachycardia) (CMS/[facility])     5. Essential hypertension     6. Acute renal failure superimposed on stage 4 chronic kidney disease, unspecified acute renal failure type (CMS/[facility])     7. PVD (peripheral vascular disease) (CMS/[facility])     8. COPD, group B, by GOLD 2017 classification (CMS/[facility])     9. BMI 28.0-28.9,adult            Plan:       1. CAD: Clinically stable with no evidence of ongoing ischemia. Patient denies any chest pain. Patient is only on ASA due to Plavix resistance and off Brilinta due to GI Bleed. Patient is on BB and statin therapy.     2. CHF: Stage C Class III. He appears hemodynamically stable in office today. Continues to have  persistent peripheral edema.  Patient is on Bumex per nephrology. He is receiving HD Tues, Thurs and Sat. Considering a fistula in his arm vs chest permacath to see if able to improve edema.     3/4. S/P ACID/ NSVT: Interrogation today- full report pending. A=pacing 16%; RV 20%, LV 87%. No episodes, No ATP or shocks. Patient was noted to have a runs of VT at 188 bpm on 4/4/2020 he received ATP it accelerated to VF that was treated successfully with 41Jshock. The report was sent to Dr Cantu for review. He recommended ATP should potentially be turned off since it appears to have accelerated the rhythm. He also recommended to adjust VT zone to 10 seconds and VF zone to 5 seconds to see if patient would break on own.  I discussed patient with Dr Cantu today; he states that we will make changes to device settings and continue to monitor at this time. Continue previously prescribed Amiodarone dosage.     5. HTN: weill controlled. Patient is on BB. Patient was previously on Entresto and stopped during hospitalization due to EMMANUEL. Coreg was also decreased at this time due to hypotension. No ACEI or ARB added due to hypotension    6. EMMANUEL: Followed by Dr Trevino. On HD- Tues, Thurs and Sat    7. PVD: Followed by Dr Chavarria    8. COPD: Followed by Pulmonology     9. BMI: Patient's Body mass index is 28.48 kg/m². BMI is above normal parameters. Recommendations include: nutrition counseling.       Advance Care Planning   ACP discussion was held with the patient during this visit. Patient does not have an advance directive, information provided.    Patient is to return to office in 4-6 weeks or sooner if needed

## 2020-04-17 NOTE — PATIENT INSTRUCTIONS
Advance Care Planning and Advance Directives     You make decisions on a daily basis - decisions about where you want to live, your career, your home, your life. Perhaps one of the most important decisions you face is your choice for future medical care. Take time to talk with your family and your healthcare team and start planning today.  Advance Care Planning is a process that can help you:  · Understand possible future healthcare decisions in light of your own experiences  · Reflect on those decision in light of your goals and values  · Discuss your decisions with those closest to you and the healthcare professionals that care for you  · Make a plan by creating a document that reflects your wishes    Surrogate Decision Maker  In the event of a medical emergency, which has left you unable to communicate or to make your own decisions, you would need someone to make decisions for you.  It is important to discuss your preferences for medical treatment with this person while you are in good health.     Qualities of a surrogate decision maker:  • Willing to take on this role and responsibility  • Knows what you want for future medical care  • Willing to follow your wishes even if they don't agree with them  • Able to make difficult medical decisions under stressful circumstances    Advance Directives  These are legal documents you can create that will guide your healthcare team and decision maker(s) when needed. These documents can be stored in the electronic medical record.    · Living Will - a legal document to guide your care if you have a terminal condition or a serious illness and are unable to communicate. States vary by statute in document names/types, but most forms may include one or more of the following:        -  Directions regarding life-prolonging treatments        -  Directions regarding artificially provided nutrition/hydration        -  Choosing a healthcare decision maker        -  Direction  regarding organ/tissue donation    · Durable Power of  for Healthcare - this document names an -in-fact to make medical decisions for you, but it may also allow this person to make personal and financial decisions for you. Please seek the advice of an  if you need this type of document.    **Advance Directives are not required and no one may discriminate against you if you do not sign one.    Medical Orders  Many states allow specific forms/orders signed by your physician to record your wishes for medical treatment in your current state of health. This form, signed in personal communication with your physician, addresses resuscitation and other medical interventions that you may or may not want.      For more information or to schedule a time with a [facility] Advance Care Planning Facilitator contact: [email or web address] or call [phone] and someone will contact you directly.

## 2020-04-28 PROBLEM — N17.9 ACUTE KIDNEY FAILURE, UNSPECIFIED (HCC): Status: ACTIVE | Noted: 2020-04-28

## 2020-04-28 RX ORDER — AMIODARONE HYDROCHLORIDE 200 MG/1
200 TABLET ORAL 2 TIMES DAILY
COMMUNITY

## 2020-04-28 RX ORDER — ALLOPURINOL 100 MG/1
100 TABLET ORAL 2 TIMES DAILY
COMMUNITY

## 2020-04-28 RX ORDER — BUMETANIDE 1 MG/1
1 TABLET ORAL 2 TIMES DAILY
COMMUNITY

## 2020-04-28 RX ORDER — METOLAZONE 2.5 MG/1
2.5 TABLET ORAL EVERY OTHER DAY
COMMUNITY

## 2020-04-28 RX ORDER — CARVEDILOL 12.5 MG/1
12.5 TABLET ORAL 2 TIMES DAILY WITH MEALS
COMMUNITY

## 2020-04-28 RX ORDER — ATORVASTATIN CALCIUM 40 MG/1
40 TABLET, FILM COATED ORAL DAILY
COMMUNITY

## 2020-04-28 RX ORDER — PANTOPRAZOLE SODIUM 40 MG/1
40 TABLET, DELAYED RELEASE ORAL 2 TIMES DAILY
COMMUNITY

## 2020-04-28 RX ORDER — CALCITRIOL 0.25 UG/1
0.25 CAPSULE, LIQUID FILLED ORAL DAILY
COMMUNITY

## 2020-04-29 ENCOUNTER — HOSPITAL ENCOUNTER (OUTPATIENT)
Dept: VASCULAR LAB | Age: 70
Discharge: HOME | End: 2020-04-29
Payer: MEDICARE

## 2020-04-29 PROCEDURE — 93985 DUP-SCAN HEMO COMPL BI STD: CPT

## 2020-04-30 ENCOUNTER — VIRTUAL VISIT (OUTPATIENT)
Dept: VASCULAR SURGERY | Age: 70
End: 2020-04-30
Payer: MEDICARE

## 2020-04-30 PROCEDURE — 99203 OFFICE O/P NEW LOW 30 MIN: CPT | Performed by: NURSE PRACTITIONER

## 2020-04-30 NOTE — PROGRESS NOTES
2020    TELEHEALTH EVALUATION -- Audio/Visual (During OAXPL-35 public health emergency)    HPI:    Patient is located at dialysis  Provider is located at [facility]   Also present during call is dialysis nurse    Maris Katarzyna (:  1950) has requested an audio/video evaluation for the following concern(s):    He has a history of chronic kidney disease for a duration of 1 - 5 years. This is believed to be caused by unknown etiology. He has experienced no problems. He is now stage V and is on hemodialysis. He has had previous permacath. He is right handed. He does have a pacemaker. Prior to Visit Medications    Medication Sig Taking? Authorizing Provider   allopurinol (ZYLOPRIM) 100 MG tablet Take 100 mg by mouth 2 times daily Yes Historical Provider, MD   amiodarone (CORDARONE) 200 MG tablet Take 200 mg by mouth 2 times daily Yes Historical Provider, MD   aspirin 81 MG tablet Take 81 mg by mouth daily Yes Historical Provider, MD   atorvastatin (LIPITOR) 40 MG tablet Take 40 mg by mouth daily Yes Historical Provider, MD   bumetanide (BUMEX) 1 MG tablet Take 1 mg by mouth 2 times daily Yes Historical Provider, MD   calcitRIOL (ROCALTROL) 0.25 MCG capsule Take 0.25 mcg by mouth daily Yes Historical Provider, MD   carvedilol (COREG) 12.5 MG tablet Take 12.5 mg by mouth 2 times daily (with meals) Yes Historical Provider, MD   HYDROcodone Bitartrate ER 10 MG C12A Take by mouth.  Yes Historical Provider, MD   metOLazone (ZAROXOLYN) 2.5 MG tablet Take 2.5 mg by mouth every other day 1 TABLET EVERY OTHER DAY Yes Historical Provider, MD   Multiple Vitamins-Minerals (MULTIVITAMIN ADULT PO) Take by mouth daily Yes Historical Provider, MD   pantoprazole (PROTONIX) 40 MG tablet Take 40 mg by mouth 2 times daily Yes Historical Provider, MD       Social History     Tobacco Use    Smoking status: Former Smoker     Last attempt to quit: 2012     Years since quittin.0   Substance Use Topics    Vitals/Constitutional/EENT/Resp/CV/GI//MS/Neuro/Skin/Heme-Lymph-Imm. ASSESSMENT/PLAN:  1. Acute renal failure, unspecified acute renal failure type ([facility] [address]        No follow-ups on file. We will wait until he is deemed chronic to proceed  Right arm av fistula    No needle sticks right arm  Strongly encouraged start/continue statin therapy  Recommended no smoking  An  electronic signature was used to authenticate this note. --TEE Yan on 4/30/2020 at 10:48 AM        Pursuant to the emergency declaration under the [facility] [address] [facility], [facility] waiver authority and the [employer] Act, this Virtual  Visit was conducted, with patient's consent, to reduce the patient's risk of exposure to COVID-19 and provide continuity of care for an established patient. Services were provided through a video synchronous discussion virtually to substitute for in-person clinic visit.

## 2020-05-07 NOTE — PROGRESS NOTES
[employer] HeartLogic Heart Failure Index  Remote/Latitude        April 29, 2020     Primary Cardiologist: Mike  : Guidant Model: Momentum X4 CRT-D G138  Implant date: 2/21/2020     Reason for evaluation: monthly HeartLogic check  Indication for AICD: congestive heart failure     HeartLogic Heart Failure Index:  0 on 4/28/2020     Contributing Factors: N/A     Patient Symptom Check: N/A     Follow up: 1 month

## 2020-06-10 NOTE — PROGRESS NOTES
[email or web address] HeartLogic Heart Failure Index  Remote/Latitude        Meaghan 3, 2020     Primary Cardiologist: Mike  : Guidant Model: Momentum X4 CRT-D G138  Implant date: 2/21/2020     Reason for evaluation: monthly HeartLogic check  Indication for AICD: congestive heart failure     HeartLogic Heart Failure Index:  7 on 6/2/2020     Contributing Factors: N/A     Patient Symptom Check: N/A     Follow up: 1 month

## 2020-06-11 ENCOUNTER — TELEPHONE (OUTPATIENT)
Dept: VASCULAR SURGERY | Age: 70
End: 2020-06-11

## 2020-06-17 NOTE — PLAN OF CARE
Problem: Patient Care Overview  Goal: Plan of Care Review  Outcome: Ongoing (interventions implemented as appropriate)  Flowsheets (Taken 1/20/2020 0406)  Progress: improving  Plan of Care Reviewed With: patient  Outcome Summary: Pt c/o neck pain and headache, given prn pain medication. Pt has had trouble resting tonight. Sinus 74-78, with pvc's  on tele. Room air. A&O. Strict I&O. Continue to monitor.     Problem: Patient Care Overview  Goal: Individualization and Mutuality  Outcome: Ongoing (interventions implemented as appropriate)  Flowsheets (Taken 1/20/2020 0406)  Patient Specific Preferences: Likes ice water     Problem: Pain, Chronic (Adult)  Goal: Acceptable Pain/Comfort Level and Functional Ability  Outcome: Ongoing (interventions implemented as appropriate)  Flowsheets (Taken 1/20/2020 0406)  Acceptable Pain/Comfort Level and Functional Ability: making progress toward outcome     Problem: Cardiac: Heart Failure (Adult)  Goal: Signs and Symptoms of Listed Potential Problems Will be Absent, Minimized or Managed (Cardiac: Heart Failure)  Outcome: Ongoing (interventions implemented as appropriate)  Flowsheets (Taken 1/20/2020 0406)  Problems Assessed (Heart Failure): all  Problems Present (Heart Failure): situational response      no

## 2020-06-22 ENCOUNTER — TELEPHONE (OUTPATIENT)
Dept: VASCULAR SURGERY | Age: 70
End: 2020-06-22

## 2020-06-22 ENCOUNTER — HOSPITAL ENCOUNTER (EMERGENCY)
Age: 70
Discharge: HOME | End: 2020-06-22
Payer: MEDICARE

## 2020-06-22 VITALS
TEMPERATURE: 97.9 F | RESPIRATION RATE: 16 BRPM | OXYGEN SATURATION: 95 % | BODY MASS INDEX: 28.25 KG/M2 | DIASTOLIC BLOOD PRESSURE: 57 MMHG | HEART RATE: 68 BPM | SYSTOLIC BLOOD PRESSURE: 102 MMHG | HEIGHT: 76 IN | WEIGHT: 232 LBS

## 2020-06-22 PROCEDURE — 99282 EMERGENCY DEPT VISIT SF MDM: CPT

## 2020-06-22 PROCEDURE — 6370000000 HC RX 637 (ALT 250 FOR IP): Performed by: PHYSICIAN ASSISTANT

## 2020-06-22 RX ADMIN — Medication 1 EACH: at 10:44

## 2020-06-22 SDOH — HEALTH STABILITY: MENTAL HEALTH: HOW OFTEN DO YOU HAVE A DRINK CONTAINING ALCOHOL?: NEVER

## 2020-06-22 ASSESSMENT — ENCOUNTER SYMPTOMS
COLOR CHANGE: 0
EYE ITCHING: 0
COUGH: 0
EYE DISCHARGE: 0
APNEA: 0
PHOTOPHOBIA: 0
SHORTNESS OF BREATH: 0
BACK PAIN: 0

## 2020-06-22 NOTE — ED PROVIDER NOTES
[facility]  eMERGENCYdEPARTMENT eNCOUnter      Pt Name: Abi Simmons  MRN: [id number]  [place] 1950  Date of evaluation: 6/22/2020  Provider:SOHAIL Knight    CHIEF COMPLAINT       Chief Complaint   Patient presents with   Saurabh David yesterday. Seen at [facility].  Today left arm cont's to bleed. HISTORY OF PRESENT ILLNESS  (Location/Symptom, Timing/Onset, Context/Setting, Quality, Duration, Modifying Factors, Severity.)   Abi Simmons is a 71 y.o. male who presents to the emergency department with complaints of persistent bleeding from skin abrasion left forearm. Patient had a mechanical fall last night slipped over carpet coming down he hit his face and cut his arm on the side of a table. Patient was seen at [facility] ER he has 6 stitches above his left eyebrow this is from his glasses he tells me he is got significant periorbital swelling he denies any findings on his head or facial bone CTs done last night. Patient has full range of motion of his left upper extremity. He states that he actually was coming here today to get preop for getting a right sided av fistula placed in his arm to have dialysis he currently has a port over the right aspect of his chest.  Patient states today was preop another not to place this in his arm for a month now he denies being on blood thinners to me states he is always had an issue with bleeding. Patient denies any other complaints. HPI    Nursing Notes were reviewed and I agree. REVIEW OF SYSTEMS    (2-9 systems for level 4, 10 or more for level 5)     Review of Systems   Constitutional: Negative for activity change, appetite change, chills and fever. HENT: Negative for congestion and dental problem. Eyes: Negative for photophobia, discharge and itching. Respiratory: Negative for apnea, cough and shortness of breath. Cardiovascular: Negative for chest pain.    Musculoskeletal: Negative for Neck:      Musculoskeletal: Normal range of motion and neck supple. Trachea: No tracheal deviation. Cardiovascular:      Rate and Rhythm: Normal rate and regular rhythm. Heart sounds: Normal heart sounds. No murmur. Pulmonary:      Effort: Pulmonary effort is normal.      Breath sounds: Normal breath sounds. No stridor. No wheezing. Chest:      Chest wall: No tenderness. Abdominal:      General: Bowel sounds are normal. There is no distension. Palpations: Abdomen is soft. Tenderness: There is no abdominal tenderness. Musculoskeletal: Normal range of motion. General: Tenderness present. Arms:    Skin:     General: Skin is warm and dry. Capillary Refill: Capillary refill takes less than 2 seconds. Neurological:      Mental Status: He is alert and oriented to person, place, and time. Psychiatric:         Behavior: Behavior normal.           DIAGNOSTIC RESULTS     RADIOLOGY:   Non-plain film images such as CT, Ultrasound and MRI are read by the radiologist. Plain radiographic images are visualized and preliminarilyinterpreted by No att. providers found with the below findings:      Interpretation per the Radiologist below, if available at the time of this note:    No orders to display       LABS:  Labs Reviewed - No data to display    All other labs were within normal range or notreturned as of this dictation. RE-ASSESSMENT        EMERGENCY DEPARTMENT COURSE and DIFFERENTIAL DIAGNOSIS/MDM:   Vitals:    Vitals:    06/22/20 0922 06/22/20 0925   BP:  (!) 102/57   Pulse: 68    Resp: 16    Temp: 97.9 °F (36.6 °C)    SpO2: 95%    Weight: 232 lb (105.2 kg)    Height: 6' 4\" (1.93 m)        MDM  Patient has wound bleeding control has full ROM all benign imaging done last night in regard to face and head. Plan for discharge. And keep follow up appointment with dr Betty Christianson for fistula placement. Without any AMS or new symptoms did not feel repeat imaging.

## 2020-07-20 ENCOUNTER — TELEPHONE (OUTPATIENT)
Dept: VASCULAR SURGERY | Age: 70
End: 2020-07-20

## 2020-07-20 NOTE — TELEPHONE ENCOUNTER
Pt had called me back I told him his pre-op testing to be done on august 3 @ 9.00 then covid-19, confirmed and understanding

## 2020-07-30 ENCOUNTER — VIRTUAL VISIT (OUTPATIENT)
Dept: VASCULAR SURGERY | Age: 70
End: 2020-07-30
Payer: MEDICARE

## 2020-07-30 PROCEDURE — 99214 OFFICE O/P EST MOD 30 MIN: CPT | Performed by: NURSE PRACTITIONER

## 2020-07-30 RX ORDER — SODIUM CHLORIDE 0.9 % (FLUSH) 0.9 %
10 SYRINGE (ML) INJECTION EVERY 12 HOURS SCHEDULED
Status: CANCELLED | OUTPATIENT
Start: 2020-07-30

## 2020-07-30 RX ORDER — SODIUM CHLORIDE 0.9 % (FLUSH) 0.9 %
10 SYRINGE (ML) INJECTION PRN
Status: CANCELLED | OUTPATIENT
Start: 2020-07-30

## 2020-07-30 RX ORDER — ASPIRIN 81 MG/1
81 TABLET ORAL ONCE
Status: CANCELLED | OUTPATIENT
Start: 2020-07-30 | End: 2020-07-30

## 2020-07-30 NOTE — PROGRESS NOTES
2020    TELEHEALTH EVALUATION -- Audio/Visual (During BOKNN-54 public health emergency)    HPI:    Patient is located at dialysis  Provider is located at [facility] [place]   Also present during call is nurse    Elvia Byrds (:  1950) has requested an audio/video evaluation for the following concern(s):    David Vázquez has a history of chronic kidney disease for a duration of 1 - 5 years. This is believed to be caused by unknown etiology. He has experienced no problems. David Vázquez is now stage V and is on hemodiaylsis. He is right handed. He has had previous permacath. This access has been working okay but is in need of more permanent access. Prior to Visit Medications    Medication Sig Taking? Authorizing Provider   allopurinol (ZYLOPRIM) 100 MG tablet Take 100 mg by mouth 2 times daily Yes Historical Provider, MD   amiodarone (CORDARONE) 200 MG tablet Take 200 mg by mouth 2 times daily Yes Historical Provider, MD   aspirin 81 MG tablet Take 81 mg by mouth daily Yes Historical Provider, MD   atorvastatin (LIPITOR) 40 MG tablet Take 40 mg by mouth daily Yes Historical Provider, MD   bumetanide (BUMEX) 1 MG tablet Take 1 mg by mouth 2 times daily Yes Historical Provider, MD   calcitRIOL (ROCALTROL) 0.25 MCG capsule Take 0.25 mcg by mouth daily Yes Historical Provider, MD   carvedilol (COREG) 12.5 MG tablet Take 12.5 mg by mouth 2 times daily (with meals) Yes Historical Provider, MD   HYDROcodone Bitartrate ER 10 MG C12A Take by mouth.  Yes Historical Provider, MD   metOLazone (ZAROXOLYN) 2.5 MG tablet Take 2.5 mg by mouth every other day 1 TABLET EVERY OTHER DAY Yes Historical Provider, MD   Multiple Vitamins-Minerals (MULTIVITAMIN ADULT PO) Take by mouth daily Yes Historical Provider, MD   pantoprazole (PROTONIX) 40 MG tablet Take 40 mg by mouth 2 times daily Yes Historical Provider, MD       Social History     Tobacco Use    Smoking status: Former Smoker     Last attempt to quit: 2012     Years since quittin.2    Smokeless tobacco: Never Used   Substance Use Topics    Alcohol use: Never     Frequency: Never    Drug use: Never        No Known Allergies,   Past Medical History:   Diagnosis Date    Acute [facility] failure, unspecified ([facility] [address]     Chronic kidney disease, stage V requiring chronic dialysis ([facility] [address]     HTN (hypertension)     Hyperlipidemia    ,   Past Surgical History:   Procedure Laterality Date    CARDIAC DEFIBRILLATOR PLACEMENT      TUNNELED VENOUS CATHETER PLACEMENT      bicking   ,   Social History     Tobacco Use    Smoking status: Former Smoker     Last attempt to quit: 2012     Years since quittin.2    Smokeless tobacco: Never Used   Substance Use Topics    Alcohol use: Never     Frequency: Never    Drug use: Never   , No family history on file.,   Health Maintenance   Topic Date Due    TSH testing  1950    Potassium monitoring  1950    Creatinine monitoring  1950    AAA screen  1950    Hepatitis C screen  1950    Lipid screen  10/20/1960    DTaP/Tdap/Td vaccine (1 - Tdap) 10/20/1969    Diabetes screen  10/20/1990    Shingles Vaccine (1 of 2) 10/20/2000    Colon cancer screen colonoscopy  10/20/2000    Annual Wellness Visit (AWV)  2020    Flu vaccine (1) 2020    Pneumococcal 65+ years Vaccine  Completed    Hepatitis A vaccine  Aged Out    Hepatitis B vaccine  Aged Out    Hib vaccine  Aged Out    Meningococcal (ACWY) vaccine  Aged Out       Review of Systems    Constitutional - no significant activity change, appetite change, or unexpected weight change. No fever or chills. No diaphoresis or significant fatigue. HENT - no significant rhinorrhea or epistaxis. No tinnitus or significant hearing loss. Eyes - no sudden vision change or amaurosis. Respiratory - no significant shortness of breath, wheezing, or stridor. No apnea, cough, or chest tightness associated with shortness of breath.   Cardiovascular - no this.  Risks have been discussed with the patient including but not limited to MI, death, CVA, bleed, nerve injury, steal syndrome, and infection. ASSESSMENT/PLAN:  1. Chronic kidney disease, stage V requiring chronic dialysis ([facility] [address]        No follow-ups on file. Proceed with right arm av fistula  Strongly encouraged start/continue statin therapy  Recommended no smoking  An  electronic signature was used to authenticate this note. --TEE Delacruz on 7/30/2020 at 10:13 AM        Pursuant to the emergency declaration under the [facility], [facility] waiver authority and the [employer] and [employer] Act, this Virtual  Visit was conducted, with patient's consent, to reduce the patient's risk of exposure to COVID-19 and provide continuity of care for an established patient. Services were provided through a video synchronous discussion virtually to substitute for in-person clinic visit.

## 2020-07-30 NOTE — H&P
2020    TELEHEALTH EVALUATION -- Audio/Visual (During ASIEQ-66 public health emergency)    HPI:    Patient is located at dialysis  Provider is located at [facility]   Also present during call is nurse    Curt Osborne (:  1950) has requested an audio/video evaluation for the following concern(s):    Eamon Turpin has a history of chronic kidney disease for a duration of 1 - 5 years. This is believed to be caused by unknown etiology. He has experienced no problems. Eamon Turpin is now stage V and is on hemodiaylsis. He is right handed. He has had previous permacath. This access has been working okay but is in need of more permanent access. Prior to Visit Medications    Medication Sig Taking? Authorizing Provider   allopurinol (ZYLOPRIM) 100 MG tablet Take 100 mg by mouth 2 times daily Yes Historical Provider, MD   amiodarone (CORDARONE) 200 MG tablet Take 200 mg by mouth 2 times daily Yes Historical Provider, MD   aspirin 81 MG tablet Take 81 mg by mouth daily Yes Historical Provider, MD   atorvastatin (LIPITOR) 40 MG tablet Take 40 mg by mouth daily Yes Historical Provider, MD   bumetanide (BUMEX) 1 MG tablet Take 1 mg by mouth 2 times daily Yes Historical Provider, MD   calcitRIOL (ROCALTROL) 0.25 MCG capsule Take 0.25 mcg by mouth daily Yes Historical Provider, MD   carvedilol (COREG) 12.5 MG tablet Take 12.5 mg by mouth 2 times daily (with meals) Yes Historical Provider, MD   HYDROcodone Bitartrate ER 10 MG C12A Take by mouth.  Yes Historical Provider, MD   metOLazone (ZAROXOLYN) 2.5 MG tablet Take 2.5 mg by mouth every other day 1 TABLET EVERY OTHER DAY Yes Historical Provider, MD   Multiple Vitamins-Minerals (MULTIVITAMIN ADULT PO) Take by mouth daily Yes Historical Provider, MD   pantoprazole (PROTONIX) 40 MG tablet Take 40 mg by mouth 2 times daily Yes Historical Provider, MD       Social History     Tobacco Use    Smoking status: Former Smoker     Last attempt to quit: 2012     Years since quittin.2    Smokeless tobacco: Never Used   Substance Use Topics    Alcohol use: Never     Frequency: Never    Drug use: Never        No Known Allergies,   Past Medical History:   Diagnosis Date    Acute [facility] failure, unspecified ([facility] [address]     Chronic kidney disease, stage V requiring chronic dialysis ([facility] [address]     HTN (hypertension)     Hyperlipidemia    ,   Past Surgical History:   Procedure Laterality Date    CARDIAC DEFIBRILLATOR PLACEMENT      TUNNELED VENOUS CATHETER PLACEMENT      [place]   ,   Social History     Tobacco Use    Smoking status: Former Smoker     Last attempt to quit: 2012     Years since quittin.2    Smokeless tobacco: Never Used   Substance Use Topics    Alcohol use: Never     Frequency: Never    Drug use: Never   , No family history on file.,   Health Maintenance   Topic Date Due    TSH testing  1950    Potassium monitoring  1950    Creatinine monitoring  1950    AAA screen  1950    Hepatitis C screen  1950    Lipid screen  10/20/1960    DTaP/Tdap/Td vaccine (1 - Tdap) 10/20/1969    Diabetes screen  10/20/1990    Shingles Vaccine (1 of 2) 10/20/2000    Colon cancer screen colonoscopy  10/20/2000    Annual Wellness Visit (AWV)  2020    Flu vaccine (1) 2020    Pneumococcal 65+ years Vaccine  Completed    Hepatitis A vaccine  Aged Out    Hepatitis B vaccine  Aged Out    Hib vaccine  Aged Out    Meningococcal (ACWY) vaccine  Aged Out       Review of Systems    Constitutional - no significant activity change, appetite change, or unexpected weight change. No fever or chills. No diaphoresis or significant fatigue. HENT - no significant rhinorrhea or epistaxis. No tinnitus or significant hearing loss. Eyes - no sudden vision change or amaurosis. Respiratory - no significant shortness of breath, wheezing, or stridor. No apnea, cough, or chest tightness associated with shortness of breath.   Cardiovascular - no chest pain, syncope, or significant dizziness. No palpitations or significant leg swelling.  has not had claudication. Gastrointestinal -  has not had abdominal swelling or pain. No blood in stool. No severe constipation, diarrhea, nausea, or vomiting. Genitourinary - No difficulty urinating, dysuria, frequency, or urgency. No flank pain or hematuria. Musculoskeletal - has not had back pain, gait disturbance, or myalgia. Skin - no color change, rash, pallor, or new wound. Neurologic - no dizziness, facial asymmetry, or light headedness. No seizures. No speech difficulty or lateralizing weakness. Hematologic - no easy bruising or excessive bleeding. Psychiatric - no severe anxiety or nervousness. No confusion. All other review of systems are negative. PHYSICAL EXAMINATION:    Due to this being a TeleHealth encounter, evaluation of the following organ systems is limited: Vitals/Constitutional/EENT/Resp/CV/GI//MS/Neuro/Skin/Heme-Lymph-Imm. Constitutional - well developed, well nourished. No diaphoresis or acute distress. HENT - head normocephalic. Right external ear canal appears normal.  Left external ear canal appears normal.  Septum appears midline. Eyes - conjunctiva normal.   No exudate. No icterus. Neck- ROM appears normal, no tracheal deviation. Extremities -No cyanosis, clubbing, no edema. No signs atheroembolic event. Pulmonary - effort appears normal.  No respiratory distress. No accessory muscle use  Musculoskeletal -   Motor grossly intact in visible lower extremities and upper extremities  Neurologic - alert and oriented X 3. Cranial Nerves II-XII grossly intact  Skin - intact. No rash, erythema, or pallor. Psychiatric - mood, affect, and behavior appear normal.  Judgment and thought processes appear normal.    Options have been discussed with the patient including continued medical management vs. proceeding with right arm av fistula.   Patient has opted to proceed with this.  Risks have been discussed with the patient including but not limited to MI, death, CVA, bleed, nerve injury, steal syndrome, and infection. ASSESSMENT/PLAN:  1. Chronic kidney disease, stage V requiring chronic dialysis ([facility] [address]        No follow-ups on file. Proceed with right arm av fistula  Strongly encouraged start/continue statin therapy  Recommended no smoking  An  electronic signature was used to authenticate this note. --TEE Weathers on 7/30/2020 at 10:13 AM        Pursuant to the emergency declaration under the [facility], [facility] waiver authority and the [employer] and [employer] Act, this Virtual  Visit was conducted, with patient's consent, to reduce the patient's risk of exposure to COVID-19 and provide continuity of care for an established patient. Services were provided through a video synchronous discussion virtually to substitute for in-person clinic visit.

## 2020-08-03 NOTE — TELEPHONE ENCOUNTER
BNP from 7/22/2020 is > 35,000 pg/mL.  Of note, patient is in renal failure and could have a falsely elevated BNP.  Patient remains asymptomatic.  [employer]ic Heart Failure Index is trending down at 10.  RN will discuss with Dr. Mckeon for his recommendations.

## 2020-08-17 ENCOUNTER — TELEPHONE (OUTPATIENT)
Dept: VASCULAR SURGERY | Age: 70
End: 2020-08-17

## 2020-08-17 NOTE — TELEPHONE ENCOUNTER
I spoke with Leann Watson this morning and he is in [place] with a broken hip. I made him aware that we will cancel his HD access creation surgery until he gets better from his broken hip. He will call our office when he is able to proceed with his surgery.

## 2020-09-30 PROBLEM — Z87.891 PERSONAL HISTORY OF NICOTINE DEPENDENCE: Status: ACTIVE | Noted: 2020-01-01

## 2020-10-01 ENCOUNTER — TELEPHONE (OUTPATIENT)
Dept: VASCULAR SURGERY | Age: 70
End: 2020-10-01

## 2020-10-01 NOTE — TELEPHONE ENCOUNTER
I left a VM for Tom to call our office back at his earliest convenience we need to know what pharmacy he uses so I can send in a prescription prior to his surgery we have scheduled. I also would like to go over the instructions with the patient while he is on the phone and fax a copy to his Dialysis clinic. I have also called his Mobile number vicente left a message and called his Dialysis clinic in [place].

## 2020-10-06 ENCOUNTER — TELEPHONE (OUTPATIENT)
Dept: VASCULAR SURGERY | Age: 70
End: 2020-10-06

## 2020-10-06 NOTE — TELEPHONE ENCOUNTER
I spoke with Teresa Robles and [facility] and she made me aware that Adenike Luis was there getting his treatment and wanted to know the date for his surgery we have scheduled. I verified while on the phone that [employer], I will send in his Bactroban ointment today. I faxed a copy of his instructions to the clinic and Teresa Robles will go over the instructions while the patient is there getting his treatment (Please see letter for instructions given), medications were also addressed. Patient will call our office if he has any questions or concerns. Patient is scheduled for a Right arm AV Fistula on 10/23/20 arriving at 9:30am for a 11:30am surgery time. Patient will be pre-treated with Bactroban ointment prior to his surgery, his pre-op and COVID test is scheduled at [facility] on 10/19/20 at 9:45am. This surgery is scheduled with Dr. Rodney Mcdowell.

## 2020-10-10 PROBLEM — R07.9 CHEST PAIN: Status: ACTIVE | Noted: 2020-01-01

## 2020-10-11 PROBLEM — D63.8 ANEMIA, CHRONIC DISEASE: Status: ACTIVE | Noted: 2020-01-01

## 2020-10-11 PROBLEM — N18.6 ESRD (END STAGE RENAL DISEASE) ON DIALYSIS (HCC): Status: ACTIVE | Noted: 2020-01-01

## 2020-10-11 PROBLEM — F17.201 TOBACCO ABUSE, IN REMISSION: Status: ACTIVE | Noted: 2020-01-01

## 2020-10-11 PROBLEM — Z95.810 AICD (AUTOMATIC CARDIOVERTER/DEFIBRILLATOR) PRESENT: Status: ACTIVE | Noted: 2020-01-01

## 2020-10-11 PROBLEM — Z99.2 ESRD (END STAGE RENAL DISEASE) ON DIALYSIS (HCC): Status: ACTIVE | Noted: 2020-01-01

## 2020-10-11 PROBLEM — I27.21 MODERATE PULMONARY ARTERIAL SYSTOLIC HYPERTENSION (HCC): Status: ACTIVE | Noted: 2020-01-01

## 2020-10-11 PROBLEM — E04.1 THYROID NODULE: Status: ACTIVE | Noted: 2020-01-01

## 2020-10-12 PROBLEM — I47.29 NSVT (NONSUSTAINED VENTRICULAR TACHYCARDIA) (HCC): Status: RESOLVED | Noted: 2020-01-01 | Resolved: 2020-01-01

## 2020-10-12 PROBLEM — E03.9 HYPOTHYROIDISM (ACQUIRED): Status: ACTIVE | Noted: 2020-01-01

## 2020-10-12 PROBLEM — I13.10 CARDIORENAL SYNDROME: Status: RESOLVED | Noted: 2020-01-01 | Resolved: 2020-01-01

## 2020-10-13 NOTE — TELEPHONE ENCOUNTER
Patient's HeartLogic Heart Failure Index is elevated at 24.  RN called to assess for signs/symptoms of worsening heart failure.  Message left for patient to return call.

## 2020-10-13 NOTE — OUTREACH NOTE
Prep Survey      Responses   [facility] facility patient discharged from?  [place]   Is LACE score < 7 ?  No   Eligibility  Readm Mgmt   Discharge diagnosis  Chest pain   Does the patient have one of the following disease processes/diagnoses(primary or secondary)?  Other   Does the patient have Home health ordered?  No   Is there a DME ordered?  No   General alerts for this patient  Hx: COPD and CHF   Prep survey completed?  Yes          Sarita Miles RN

## 2020-10-15 NOTE — TELEPHONE ENCOUNTER
Left message reminding Mr Flynn of his appointments for Friday, October 16th, 2020. [facility] for 10 o'clock testing and follow up afterwards at 115 pm with Shwetha BAIRD. Also advised Mr Flynn if he had any questions or needed to reschedule to please call the office at [phone].

## 2020-10-16 NOTE — PROGRESS NOTES
10/16/2020       Alli Perry MD  [address] [facility] [place] [address]        Jadon Flynn  1950    Chief Complaint   Patient presents with   • Follow-up     1 Year Follow Up For Bilateral Carotid Artery Stenosis and Peripheral Artery Disease. Test [id number] US pad ankle / brach ind ext comp and US pad carotid bilateral. Patient denies any stroke like symptoms.    • Smoker/Non-Smoker     Patient is Former Smoker - Quit 2010   • Med Management     Verified medications from list patient brought in. Mr Devine verbalized all medications are correct and up to date.        Dear Alli Perry MD:    HPI     I had the pleasure of seeing you patient in the office today for follow up.  As you recall, the patient is a 69 y.o. male who we are currently following for carotid occlusive disease.  He is status post left carotid endarterectomy and has a known right carotid occlusion.  Currently, he appears to be doing quite well and has no specific complaints.  He remains asymptomatic from a strokelike standpoint.  He also did undergo right lower extremity radiofrequency ablation of the greater saphenous vein for leg swelling and symptomatic venous disease.   He also recently underwent cardiac intervention as well as iliac artery intervention by Dr. Mckeon.  In April a PermCath was placed and he was to follow here when permanent access was needed.  At the end of April he was told by Hurley neisha he needed permanent access and was referred to [facility].  He states he told them he was a standing patient however dialysis told him this had to be performed at [facility].  He is scheduled for AV fistula with Dr. Edge on 10/23/2020.  This has been postponed several times due to falls.  He is maintained on aspirin and Lipitor.  He had noninvasive testing performed today in which I personally reviewed.      Review of Systems   Constitutional: Negative.    HENT: Negative.    Eyes: Negative.    Respiratory: Negative.   "  Cardiovascular: Negative.    Gastrointestinal: Negative.    Endocrine: Negative.    Genitourinary: Negative.    Musculoskeletal: Negative.    Skin: Negative.    Allergic/Immunologic: Negative.    Neurological: Negative.    Hematological: Negative.    Psychiatric/Behavioral: Negative.    All other systems reviewed and are negative.        /64 (BP Location: Left arm, Patient Position: Sitting, Cuff Size: Adult)   Pulse 60   Ht 195.6 cm (77\")   Wt 102 kg (225 lb)   SpO2 97%   BMI 26.68 kg/m²   Physical Exam   Constitutional: He is oriented to person, place, and time. He appears well-developed.   HENT:   Head: Normocephalic and atraumatic.   Neck: Neck supple. No JVD present. Carotid bruit is not present. No thyromegaly present.   Cardiovascular: Normal rate, regular rhythm and normal heart sounds.   Pulses:       Carotid pulses are 2+ on the right side and 2+ on the left side.       Femoral pulses are 2+ on the right side and 2+ on the left side.       Popliteal pulses are 2+ on the right side and 2+ on the left side.        Dorsalis pedis pulses are 2+ on the right side and 2+ on the left side.        Posterior tibial pulses are 2+ on the right side and 2+ on the left side.   permcath   Pulmonary/Chest: Effort normal and breath sounds normal.   Abdominal: Soft. Bowel sounds are normal. He exhibits no distension, no abdominal bruit and no mass. There is no abdominal tenderness.   Musculoskeletal: Normal range of motion.   Neurological: He is alert and oriented to person, place, and time. No cranial nerve deficit or sensory deficit.   Skin: Skin is warm.   Psychiatric: His behavior is normal. Mood, judgment and thought content normal.   Nursing note and vitals reviewed.      DIAGNOSTIC DATA:   Us Carotid Bilateral    Result Date: 10/16/2020  Narrative: History: Carotid occlusive disease      Impression: Impression: 1. There is known occlusion of the right internal carotid artery. 2. There is 50-69% stenosis " of the left internal carotid artery. 3. Antegrade flow is demonstrated in the right vertebral. The left vertebral has evidence of retrograde flow.  Comments: Bilateral carotid vertebral arterial duplex scan was performed. There is a known occlusion of the right internal carotid artery.  Grayscale imaging shows intimal thickening and calcified elements at the carotid bifurcation. The left internal carotid artery peak systolic velocity is 134 cm/sec. The end-diastolic velocity is 39.3 cm/sec. The left ICA/CCA ratio is approximately 1.03 . These findings correlate with 50-69% stenosis of the left internal carotid artery.  There is greater than 50% stenosis of the right external carotid artery and the left common carotid artery. This report was finalized on 10/16/2020 14:39 by Dr. Dave Chavarria MD.    Us Ankle / Brachial Indices Extremity Complete    Result Date: 10/16/2020  Narrative:  History: PAD  Comments: Bilateral lower extremity arterial with multi-level pulse volume recordings and segmental pressures were performed at rest and stress.  The right ankle/brachial index is 1.19. The waveforms are biphasic without dampening.These findings are consistent with no significant arterial insufficiency of the right lower extremity at rest.  The left ankle/brachial index is 0.9. The waveforms are biphasic without dampening. These findings are consistent with mild arterial insufficiency of the left lower extremity at rest.      Impression: Impression: 1. No significant arterial insufficiency of the right lower extremity at rest. 2. Mild arterial insufficiency of the left lower extremity at rest.   This report was finalized on 10/16/2020 14:29 by Dr. Dave Chavarria MD.    Ct Angiogram Chest    Result Date: 10/10/2020  Narrative: CT chest angiogram dated 10/10/2020 12:04 PM CDT  HISTORY: Pulmonary embolus suspected. Positive d-dimer.  COMPARISON: None.  DLP: 318 mGy cm. Automated exposure control was utilized to diminish  patient radiation dose.  TECHNIQUE: Helical tomographic images of the chest were obtained after the administration of intravenous contrast following angiogram protocol. Additionally, 3D MIP reconstructions in the coronal and sagittal planes were provided.    FINDINGS:  The imaged portion of the base of the neck appears unremarkable.  There is adequate enhancement of the pulmonary arteries to evaluate for central and segmental pulmonary emboli. There is breathing motion artifact obscuring some of the anatomic details within the lower subsegmental pulmonary artery branches. There are no filling defects within the main, lobar, segmental or visualized subsegmental pulmonary arteries. The pulmonary vessels are within normal limits.  A small to moderate left-sided and small right-sided pleural effusion are present with basilar atelectasis. There is also more confluent consolidation within both lower lobes as well as within the lingular segment of the left upper lobe worrisome for bilateral pneumonia. There is mild bronchial wall thickening within both lower lobes.. The trachea and bronchial tree are otherwise unremarkable..  There is cardiomegaly. Extensive coronary calcifications are present. There is atheromatous calcification of the thoracic aorta and proximal great vessels without evidence of aneurysm.. There is no pericardial effusion.  A 1.4 cm anterior mediastinal node is present. No enlarged middle mediastinal or axillary nodes are present. Bilateral gynecomastia is present..  The osseous structures of the thorax and surrounding soft tissues demonstrate no acute process. There is some mild enlargement of the left lobe of the thyroid gland with an associated slightly hyperdense nodule involving the lower pole measuring 2 cm in size. If not previously documented follow-up with thyroid ultrasound could be obtained.  There is ascites within the upper abdomen. There is enlargement of both adrenal glands suggesting  adenomas. Elevated right heart pressure is suggested with reflux of contrast into the intrahepatic IVC and branch vessels..      Impression: 1. The study is somewhat degraded secondary to breathing motion. This obscures some the anatomic detail within the lower lobe subsegmental pulmonary artery branches. I do not see gross evidence of pulmonary thromboembolic disease. The pulmonary arteries are normal in caliber. 2. There is some enlargement of the left lobe of the thyroid gland with a discrete hyperdense nodule involving the lower pole measuring 2 cm in size. No previous documented this could be followed up with ultrasound. 3. There is an enlarged anterior mediastinal node. No additional enlarged mediastinal mediastinal or axillary adenopathy is present. 4. Small to moderate left-sided and small right-sided effusion with compressive atelectasis. There is also more confluent disease within both lower lobes with associated bronchial wall thickening worrisome for bilateral pneumonia. There is also some consolidation within the lingular segment of the left upper lobe.. 5. Cardiomegaly with coronary calcifications present. Elevated right heart pressure is suggested with reflux of contrast into the intrahepatic IVC and branch vessels which are mildly dilated. 6. Ascites within the upper abdomen. Both adrenal glands are enlarged fracture related to bilateral adenomas. They're not imaged in their entirety.   This report was finalized on 10/10/2020 13:08 by Dr. Raúl Cotton MD.    VEIN MAPPING [facility]   Type of Study:     Extremities Arteries:Upper Extremities Arterial Mapping.     Veins:Upper Extremity Vein Mapping, VAS NIV VES MAP PRIOR TO HEMODIALYSIS   ACCESS.    Indications for Study:ESRD and Pre-op vein mapping for dialysis access.    Risk Factors      - The patient's risk factor(s) include: dyslipidemia, arterial      hypertension and prior MI .      - The patient has a former tobacco history.      - The  patient's risk factor(s) include: Prior CVA, ,     Impression     Bilateral upper extremity venous mapping with sizes as noted. Arterial   sizes and pressures as noted.   Right sided port in place.     Signature     ----------------------------------------------------------------   Electronically signed by David Edge MD(Interpreting   physician) on 04/29/2020 11:54 AM   ----------------------------------------------------------------    Blood Pressure:Right arm 110/70 mmHg.Left arm 115/80 mmHg.    Velocities are measured in cm/s ; Diameters are measured in mm    Right Upper Extremities Arterial Mapping  +------------------------+----+-------------+---------------------+--------+  !Location                !PSV !AP Diam      !Trans Diam           !Quality !  +------------------------+----+-------------+---------------------+--------+  !Prox Brachial           !74.5!             !                     !        !  +------------------------+----+-------------+---------------------+--------+  !Mid Brachial            !76.8!             !                     !        !  +------------------------+----+-------------+---------------------+--------+  !Dist Brachial           !82.7!             !4.8                  !        !  +------------------------+----+-------------+---------------------+--------+  !Prox Radial             !65.7!             !                     !        !  +------------------------+----+-------------+---------------------+--------+  !Mid Radial              !78.6!             !                     !        !  +------------------------+----+-------------+---------------------+--------+  !Dist Radial             !101 !             !2.3                  !        !  +------------------------+----+-------------+---------------------+--------+  !Prox Ulnar              !58  !             !                     !        !  +------------------------+----+-------------+---------------------+--------+  !Mid  Ulnar               !46.9!             !                     !        !  +------------------------+----+-------------+---------------------+--------+  !Dist Ulnar              !69.2!             !1.8                  !        !  +------------------------+----+-------------+---------------------+--------+    Left Upper Extremities Arterial Mapping  +------------------------+----+-------------+---------------------+--------+  !Location                !PSV !AP Diam      !Trans Diam           !Quality !  +------------------------+----+-------------+---------------------+--------+  !Prox Brachial           !65.1!             !                     !        !  +------------------------+----+-------------+---------------------+--------+  !Mid Brachial            !100 !             !                     !        !  +------------------------+----+-------------+---------------------+--------+  !Dist Brachial           !80.3!             !4.7                  !        !  +------------------------+----+-------------+---------------------+--------+  !Prox Radial             !90.3!             !                     !        !  +------------------------+----+-------------+---------------------+--------+  !Mid Radial              !92  !             !                     !        !  +------------------------+----+-------------+---------------------+--------+  !Dist Radial             !87.4!             !1.8                  !        !  +------------------------+----+-------------+---------------------+--------+  !Prox Ulnar              !76.2!             !                     !        !  +------------------------+----+-------------+---------------------+--------+  !Mid Ulnar               !71.5!             !                     !        !  +------------------------+----+-------------+---------------------+--------+  !Dist Corazon              !74.5!             !1.7                  !         !  +------------------------+----+-------------+---------------------+--------+    Velocities are measured in cm/s ; Diameters are measured in mm    Cephalic Mapping  +------------------++--------+----------+-----++--------+----------+-------+  !                  !!Right   !          !Left !!        !          !       !  +------------------++--------+----------+-----++--------+----------+-------+  !Location          !!AP Diam.!Trans Diam!Depth!!AP Diam.!Trans Diam!Depth  !  +------------------++--------+----------+-----++--------+----------+-------+  !Cephalic at UA    !!        !3.7       !     !!        !3.8       !       !  +------------------++--------+----------+-----++--------+----------+-------+  !Cephalic at Mid UA!!        !4.5       !     !!        !4.2       !       !  +------------------++--------+----------+-----++--------+----------+-------+  !Cephalic at AF    !!        !4.1       !     !!        !3.8       !       !  +------------------++--------+----------+-----++--------+----------+-------+  !Cephalic at Mid LA!!        !2.7       !     !!        !3         !       !  +------------------++--------+----------+-----++--------+----------+-------+  !Cephalic at Wrist !!        !2.6       !     !!        !3         !       !  +------------------++--------+----------+-----++--------+----------+-------+  Patient Active Problem List   Diagnosis   • PVD (peripheral vascular disease) (CMS/[facility])   • Ischemic cardiomyopathy   • COPD, group B, by GOLD 2017 classification (CMS/[facility])   • Chronic systolic heart failure (CMS/[facility])   • Moderate pulmonary arterial systolic hypertension (CMS/[facility])   • ESRD (end stage renal disease) on dialysis (CMS/[facility])   • AICD (automatic cardioverter/defibrillator) present   • Essential hypertension   • Chest pain   • Thyroid nodule   • Tobacco abuse, in remission   • Anemia, chronic disease   • Hypothyroidism (acquired)         ICD-10-CM ICD-9-CM   1. Bilateral carotid artery stenosis   I65.23 433.10     433.30   2. PAD (peripheral artery disease) (CMS/[facility])  I73.9 443.9   3. Essential hypertension  I10 401.9       PLAN: After thoroughly evaluating Jadon Flynn, I believe the best course of action is to remain conservative from vascular surgery standpoint.  I did review his testing which remains stable in both his ABIs as well as carotid duplex.  He was scheduled to have upcoming fistula creation with Dr. Edge however has called back and would like to schedule here.  I did review his vein mapping as well as reviewed with Dr. Chavarria.  He is right-hand dominant and veins are adequate for left upper extremity arteriovenous fistula.  Risks/benefits were explained at great length to the patient which include but are not limited to bleeding, infection, vessel damage, steal syndrome.  We will see him back in 1 year for continued surveillance of of carotid disease and lower extremity PAD with ABIs and a carotid duplex.  I did discuss vascular risk factors as they pertain to the progression of vascular disease including controlling his hypertension and hyperlipidemia, which are stable on his current medication regimen. Patient's Body mass index is 26.68 kg/m². BMI is above normal parameters. Recommendations include: educational material. The patient is to continue taking their medications as previously discussed.   This was all discussed in full with complete understanding.  Thank you for allowing me to participate in the care of your patient.  Please do not hesitate to call with any questions or concerns.  We will keep you aware of any further encounters with Jadon Flynn.      Sincerely Yours,        OLI Barnett

## 2020-10-16 NOTE — TELEPHONE ENCOUNTER
Patient reports intermittent BLE edema.  Denies dyspnea, orthopnea, PND, chest pain.  Patient encouraged to contact our clinic if worsening symptoms develop.  Understanding verbalized.

## 2020-10-16 NOTE — OUTREACH NOTE
Medical Week 1 Survey      Responses   [facility] patient discharged from?  [place]   Does the patient have one of the following disease processes/diagnoses(primary or secondary)?  Other   Week 1 attempt successful?  Yes   Call start time  1321   Call end time  1323   General alerts for this patient  Hx: COPD and CHF   Discharge diagnosis  Chest pain   Meds reviewed with patient/caregiver?  Yes   Is the patient having any side effects they believe may be caused by any medication additions or changes?  No   Does the patient have all medications ordered at discharge?  Yes   Is the patient taking all medications as directed (includes completed medication regime)?  Yes   Does the patient have a primary care provider?   Yes   Does the patient have an appointment with their PCP within 7 days of discharge?  Yes   Has the patient kept scheduled appointments due by today?  Yes   Comments  getting US right now   Has home health visited the patient within 72 hours of discharge?  N/A   Psychosocial issues?  No   Did the patient receive a copy of their discharge instructions?  Yes   Nursing interventions  Reviewed instructions with patient   What is the patient's perception of their health status since discharge?  Improving   Is the patient/caregiver able to teach back signs and symptoms related to disease process for when to call PCP?  Yes   Is the patient/caregiver able to teach back signs and symptoms related to disease process for when to call 911?  Yes   Is the patient/caregiver able to teach back the hierarchy of who to call/visit for symptoms/problems? PCP, Specialist, Home health nurse, Urgent Care, ED, 911  Yes   Additional teach back comments  pt doing well and states he is feeling better.   Week 1 call completed?  Yes          Joanna Leo RN

## 2020-10-20 NOTE — TELEPHONE ENCOUNTER
Ree called from [facility].  She states that the patient was accidentally referred to Dr. Edge for his AV fistula.  Pt said that he wanted to come here to have this done since he already sees Dr. Chavarria.  I told her that I would put the patient on for tomorrow with Shwetha.    I gave this info to Shwetha and she stated that she just saw the patient and has had the vein mapping.  She said that he didn't need another appt, just scheduled for surgery.  Marzena was in her office and said that she would take care of it from here.  The patient called afterward and I told him that Marzena would be calling him as soon as she got a surgery date scheduled.

## 2020-10-20 NOTE — TELEPHONE ENCOUNTER
Left message letting Mr Flynn know that he would not need his appointment for Wednesday, October 21st, 2020 at 1145 am with Shwetha BAIRD as Marzena Moore our Surgery Scheduler would be in contact with him as soon as she got surgery scheduled.

## 2020-10-21 PROBLEM — Z99.2 CHRONIC KIDNEY DISEASE ON CHRONIC DIALYSIS (HCC): Status: ACTIVE | Noted: 2020-01-01

## 2020-10-21 PROBLEM — Z01.818 PREOP TESTING: Status: ACTIVE | Noted: 2020-01-01

## 2020-10-21 PROBLEM — N18.6 CHRONIC KIDNEY DISEASE ON CHRONIC DIALYSIS (HCC): Status: ACTIVE | Noted: 2020-01-01

## 2020-10-21 NOTE — TELEPHONE ENCOUNTER
Spoke with patient and advised of upcoming procedure.  Patient pre work is scheduled for 10/27/2020 at 1015 am.  Patient procedure is scheduled for 10/30/2020 at 700 am.  Patient adfvised of of Covid testing, masking and visitation. Patient advised of location time and prep.  Patient expressed understanding for all that was discussed.

## 2020-10-22 NOTE — OUTREACH NOTE
Medical Week 2 Survey      Responses   [facility] patient discharged from?  [place]   Does the patient have one of the following disease processes/diagnoses(primary or secondary)?  Other   Week 2 attempt successful?  Yes   Call start time  1717   General alerts for this patient  Hx: COPD and CHF   Discharge diagnosis  Chest pain   Call end time  1718   Meds reviewed with patient/caregiver?  Yes   Is the patient having any side effects they believe may be caused by any medication additions or changes?  No   Does the patient have all medications ordered at discharge?  Yes   Is the patient taking all medications as directed (includes completed medication regime)?  Yes   Comments regarding appointments  Surgery canceled tomorrow patient reports.    Does the patient have a primary care provider?   Yes   Does the patient have an appointment with their PCP within 7 days of discharge?  Yes   Has the patient kept scheduled appointments due by today?  Yes   Has home health visited the patient within 72 hours of discharge?  N/A   Has all DME been delivered?  No   Psychosocial issues?  No   Did the patient receive a copy of their discharge instructions?  Yes   Nursing interventions  Reviewed instructions with patient   What is the patient's perception of their health status since discharge?  Improving   Is the patient/caregiver able to teach back signs and symptoms related to disease process for when to call PCP?  Yes   Is the patient/caregiver able to teach back signs and symptoms related to disease process for when to call 911?  Yes   Is the patient/caregiver able to teach back the hierarchy of who to call/visit for symptoms/problems? PCP, Specialist, Home health nurse, Urgent Care, ED, 911  Yes   Additional teach back comments  pt doing well and states he is feeling better.   Week 2 Call Completed?  Yes          Magan Rocha RN

## 2020-10-30 NOTE — ANESTHESIA PREPROCEDURE EVALUATION
Anesthesia Evaluation     Patient summary reviewed   no history of anesthetic complications:  NPO Solid Status: > 8 hours  NPO Liquid Status: > 8 hours           Airway   Mallampati: I  TM distance: >3 FB  Neck ROM: full  No difficulty expected  Dental - normal exam     Pulmonary    (+) COPD, home oxygen,   (-) sleep apnea  Cardiovascular   Exercise tolerance: poor (<4 METS)    ECG reviewed  Patient on routine beta blocker and Beta blocker given within 24 hours of surgery    (+) pacemaker pacemaker, ICD, hypertension, valvular problems/murmurs, past MI , CAD, cardiac stents (3 stents, most recent 2018) more than 12 months ago CHF (calculated EF on TTE 31.5%) Systolic <55%, PVD, hyperlipidemia,  carotid artery disease    ROS comment: Echo:  ·The left ventricular cavity is mild-to-moderately dilated.  ·Right ventricular cavity is moderately dilated.  ·Moderately reduced right ventricular systolic function noted.  ·Left atrial cavity size is dilated.  ·Mild mitral valve regurgitation is present  ·Mild tricuspid valve regurgitation is present.    Interrogation report reviewed 10/12/20- pt is 100% v paced.     Stress test 10/12/20  · Left ventricular ejection fraction is severely reduced. (Calculated EF = 21%).  · Myocardial perfusion imaging indicates a small-sized infarct located in the apex with no significant ischemia noted.  · Impressions are consistent with a low risk study.       Neuro/Psych  (+) CVA,     (-) seizures, TIA  GI/Hepatic/Renal/Endo    (+)  GERD,  renal disease CRI and dialysis,   (-) liver disease    Musculoskeletal     Abdominal    Substance History      OB/GYN          Other   blood dyscrasia anemia,         Phys Exam Other: Left upper recent scar with dermabond from ICD implant site                Anesthesia Plan    ASA 4     MAC   (Device rep to come interrogate device and place in asynchronous mode if necessary, will make a plan for magnet at that time.     Nidia Luther rep came to bedside to  interrogate. Pt is not pacer dependant, he is using resynchronization therapy which appears as 100% paced on report. Will place magnet to inhibit ICD< interrogate device in recovery. If any issues with pacing intraop will contact rep. )  intravenous induction     Anesthetic plan, all risks, benefits, and alternatives have been provided, discussed and informed consent has been obtained with: patient.

## 2020-10-30 NOTE — ANESTHESIA POSTPROCEDURE EVALUATION
Patient: Jadon Flynn    Procedure Summary     Date: 10/30/20 Room / Location: [facility] OR  /  PAD HYBRID OR 12    Anesthesia Start: 1210 Anesthesia Stop: 1347    Procedure: RIGHT ARTERIOVENOUS FISTULA FORMATION (N/A Arm Upper) Diagnosis:       Chronic kidney disease on chronic dialysis (CMS/HCC)      Preop testing      (Chronic kidney disease on chronic dialysis (CMS/HCC) [N18.6, Z99.2])      (Preop testing [Z01.818])    Surgeon: Dave Chavarria DO Provider: [place] CRNA    Anesthesia Type: MAC ASA Status: 4          Anesthesia Type: MAC    Vitals  Vitals Value Taken Time   BP 96/48 10/30/20 1625   Temp 99.4 °F (37.4 °C) 10/30/20 1613   Pulse 72 10/30/20 1627   Resp 16 10/30/20 1625   SpO2 90 % 10/30/20 1627   Vitals shown include unvalidated device data.        Post Anesthesia Care and Evaluation    Patient location during evaluation: PACU  Patient participation: complete - patient participated  Level of consciousness: awake and alert  Pain management: adequate  Airway patency: patent  Anesthetic complications: No anesthetic complications    Cardiovascular status: acceptable  Respiratory status: acceptable  Hydration status: acceptable    Comments: Blood pressure 90/50, pulse 70, temperature 99.4 °F (37.4 °C), temperature source Temporal, resp. rate 16, SpO2 93 %.    Pt discharged from PACU based on mainor score >8

## 2020-10-30 NOTE — OUTREACH NOTE
Medical Week 3 Survey      Responses   [facility] patient discharged from?  [facility]   Does the patient have one of the following disease processes/diagnoses(primary or secondary)?  Other   Week 3 attempt successful?  No   Revoke  Readmitted          Marcela Brand RN

## 2020-11-11 PROBLEM — E78.2 MIXED HYPERLIPIDEMIA: Status: ACTIVE | Noted: 2020-01-01

## 2020-11-11 PROBLEM — Z99.2 CHRONIC KIDNEY DISEASE ON CHRONIC DIALYSIS (HCC): Status: RESOLVED | Noted: 2020-01-01 | Resolved: 2020-01-01

## 2020-11-11 PROBLEM — I47.20 V-TACH (HCC): Status: ACTIVE | Noted: 2020-01-01

## 2020-11-11 PROBLEM — I25.10 CORONARY ARTERY DISEASE INVOLVING NATIVE CORONARY ARTERY OF NATIVE HEART WITHOUT ANGINA PECTORIS: Status: ACTIVE | Noted: 2020-01-01

## 2020-11-11 PROBLEM — N18.6 CHRONIC KIDNEY DISEASE ON CHRONIC DIALYSIS (HCC): Status: RESOLVED | Noted: 2020-01-01 | Resolved: 2020-01-01

## 2020-11-11 PROBLEM — Z95.5 S/P CORONARY ARTERY STENT PLACEMENT: Status: ACTIVE | Noted: 2020-01-01

## 2020-11-11 NOTE — PROGRESS NOTES
Subjective:     Encounter Date:11/11/2020      Patient ID: Jadon Flynn is a 70 y.o. male with a history of systolic CHF s/p BIV ICD, NSVT, VT/VF  renal failure on hemodialysis, CAD s/p MELISA to LAD, PVD, hypertension, chronic LBBB and hyperlipidemia.    Chief Complaint: hospital follow up  Coronary Artery Disease  Presents for follow-up visit. Symptoms include leg swelling. Pertinent negatives include no chest pain, dizziness, palpitations, shortness of breath or weight gain. His past medical history is significant for CHF. The symptoms have been stable.   Congestive Heart Failure  Presents for follow-up visit. Pertinent negatives include no chest pain, near-syncope, palpitations or shortness of breath. The symptoms have been stable. His past medical history is significant for CAD.   Hypertension  This is a chronic problem. The current episode started more than 1 year ago. The problem has been rapidly improving since onset. The problem is controlled. Pertinent negatives include no chest pain, malaise/fatigue, orthopnea, palpitations, PND or shortness of breath.     Patient presents today for a hospital follow up. He was admitted to the hospital on 10/10 with complaints of chest pain during dialysis. He was given 3 nitro and noted no improvement in his symptoms. His troponin was noted to be elevated at [facility] and he was transferred to [facility]. He had a lexiscan completed while inpatient that was noted to be low risk. His TSH was noted to be elevated and he was started on Synthroid. He reports he has been well since discharge. He denies further chest pain. He continues to undergo dialysis on Tu/Thur/Sat. Nephrology is recommending he stop his Coreg due to hypotension. He complains of BLE swelling that progresses throughout the day. His most recent device interrogation reveals an elevated heart logic score of 27. He denies dyspnea, orthopnea and PND.     Patient has a history of CAD with MELISA to mid LAD in 2011,  MELISA to proximal LAD in 2018 and nonobstructive disease on cath in 2019. He also has a history of CHF s/p BiV AICD implant with ICD discharge on 4/4/2020 due to sustained VT/VF.     The following portions of the patient's history were reviewed and updated as appropriate: allergies, current medications, past family history, past medical history, past social history, past surgical history and problem list.    No Known Allergies    Current Outpatient Medications:   •  allopurinol (ZYLOPRIM) 100 MG tablet, Take 100 mg by mouth Daily., Disp: , Rfl:   •  amiodarone (PACERONE) 200 MG tablet, Take 200 mg by mouth 2 (Two) Times a Day., Disp: , Rfl:   •  aspirin 81 MG EC tablet, Take 81 mg by mouth Daily., Disp: , Rfl:   •  atorvastatin (LIPITOR) 40 MG tablet, Take 40 mg by mouth Every Night., Disp: , Rfl:   •  bumetanide (BUMEX) 1 MG tablet, Take 1 tablet by mouth 2 (Two) Times a Day., Disp: 180 tablet, Rfl: 3  •  calcitriol (ROCALTROL) 0.25 MCG capsule, Take 0.25 mcg by mouth Take As Directed. 3 TIMES A WEEK ONLY WITH DIALYSIS ON TUES/THURS/SATURDAY, Disp: , Rfl:   •  Ferric Citrate (Auryxia) 1  MG(Fe) tablet, Take 2 tablets by mouth 3 (Three) Times a Day With Meals., Disp: , Rfl:   •  HYDROcodone-acetaminophen (NORCO) 7.5-325 MG per tablet, Take 1 tablet by mouth Every 8 (Eight) Hours As Needed for Moderate Pain ., Disp: , Rfl: 0  •  levothyroxine (SYNTHROID, LEVOTHROID) 50 MCG tablet, Take 1 tablet by mouth Daily., Disp: 30 tablet, Rfl: 1  •  metOLazone (ZAROXOLYN) 2.5 MG tablet, Take 2.5 mg by mouth Take As Directed. Take one tablet every other day on except on dyalisis days. Take at least 30 minutes before taking furosemide., Disp: , Rfl:   •  Multiple Vitamins-Minerals (ONE-A-DAY MENS 50+ ADVANTAGE) tablet, Take 1 tablet by mouth Daily., Disp: , Rfl:   •  pantoprazole (PROTONIX) 40 MG EC tablet, Take 40 mg by mouth 2 (Two) Times a Day., Disp: , Rfl:   Past Medical History:   Diagnosis Date   • Anemia    •  Arthritis    • Asymptomatic bilateral carotid artery stenosis    • CAD (coronary artery disease) 12/07/2016   • CAD in native artery      2009 stents   • Cardiomyopathy (CMS/HCC)    • Carotid artery disease (CMS/HCC)    • Cervical radiculopathy    • CHF (congestive heart failure) (CMS/HCC) 12/07/2016   • Chronic combined systolic and diastolic CHF (congestive heart failure) (CMS/HCC)     echo 5/2013 EF 35-40%   • Chronic rhinitis 3/26/2020   • Chronic systolic CHF (congestive heart failure), NYHA class 2 (CMS/HCC)    • CKD (chronic kidney disease) 12/07/2016   • COPD, group B, by GOLD 2017 classification ([facility]) 11/20/2018   • Coronary artery disease involving native coronary artery of native heart without angina pectoris 12/7/2016    3.0 x28 mm Xience MELISA to the proximal LAD in 12/2018   • Dialysis patient (CMS/HCC)    • Essential hypertension    • GI bleeding    • Gout    • HLD (hyperlipidemia) 12/07/2016   • HTN (hypertension) 12/07/2016   • Hyperkalemia    • Hyperlipidemia, unspecified    • ICAO (internal carotid artery occlusion), right 3/27/2018   • Ischemic heart disease 12/07/2016   • Ischemic heart disease    • Non-sustained ventricular tachycardia (CMS/HCC) 1/18/2020   • NSTEMI (non-ST elevated myocardial infarction) (CMS/HCC) 12/28/2018   • Peripheral vascular disease (CMS/HCC)    • Personal history of nicotine dependence 9/30/2020   • Pinched nerve in neck    • PVD (peripheral vascular disease) (CMS/HCC) 12/07/2016   • S/P implantation of automatic cardioverter/defibrillator (AICD) 12/07/2016   • S/P implantation of automatic cardioverter/defibrillator (AICD)    • Stroke (CMS/HCC)    • Ventricular tachycardia (CMS/HCC) 10/30/2018       Social History     Socioeconomic History   • Marital status:      Spouse name: Not on file   • Number of children: Not on file   • Years of education: Not on file   • Highest education level: Not on file   Tobacco Use   • Smoking status: Former Smoker      Packs/day: 1.00     Years: 35.00     Pack years: 35.00     Types: Cigarettes     Start date: 1967     Quit date: 2010     Years since quitting: 10.8   • Smokeless tobacco: Never Used   Substance and Sexual Activity   • Alcohol use: Not Currently     Comment: quit 2008   • Drug use: Never   • Sexual activity: Defer       Review of Systems   Constitution: Negative for malaise/fatigue, weight gain and weight loss.   Cardiovascular: Positive for leg swelling. Negative for chest pain, dyspnea on exertion, irregular heartbeat, near-syncope, orthopnea, palpitations, paroxysmal nocturnal dyspnea and syncope.   Respiratory: Negative for cough, shortness of breath, sleep disturbances due to breathing, sputum production and wheezing.    Skin: Negative for dry skin, flushing, itching and rash.   Gastrointestinal: Negative for hematemesis and hematochezia.   Neurological: Negative for dizziness, light-headedness, loss of balance and weakness.   All other systems reviewed and are negative.         Objective:     Vitals signs reviewed.   Constitutional:       General: Not in acute distress.     Appearance: Well-developed. Not diaphoretic.   Eyes:      General: No scleral icterus.     Conjunctiva/sclera: Conjunctivae normal.      Pupils: Pupils are equal, round, and reactive to light.   HENT:      Head: Normocephalic.    Mouth/Throat:      Pharynx: No oropharyngeal exudate.   Neck:      Musculoskeletal: Normal range of motion and neck supple.   Pulmonary:      Effort: Pulmonary effort is normal. No respiratory distress.      Breath sounds: Examination of the right-lower field reveals rales. Examination of the left-lower field reveals rales. No wheezing. Rales present.   Chest:      Chest wall: Not tender to palpatation.   Cardiovascular:      Normal rate. Regular rhythm.   Pulses:     Intact distal pulses.   Edema:     Peripheral edema present.     Pretibial: bilateral 3+ pitting edema of the pretibial area.     Ankle: bilateral 3+  "pitting edema of the ankle.     Feet: bilateral 3+ pitting edema of the feet.  Abdominal:      General: Bowel sounds are normal. There is no distension.      Palpations: Abdomen is soft.      Tenderness: There is no abdominal tenderness.   Musculoskeletal: Normal range of motion.   Skin:     General: Skin is warm and dry.      Coloration: Skin is not pale.      Findings: No erythema or rash.   Neurological:      Mental Status: Alert and oriented to person, place, and time.      Deep Tendon Reflexes: Reflexes are normal and symmetric.   Psychiatric:         Behavior: Behavior normal.             ECG 12 Lead    Date/Time: 11/12/2020 8:10 AM  Performed by: Brenda Alvarado APRN  Authorized by: Brenda Alvarado APRN   Comparison: compared with previous ECG from 10/10/2020  Similar to previous ECG  Rhythm: paced  Rate: normal  BPM: 72  QRS axis: normal    Clinical impression: abnormal EKG          BP 98/56   Pulse 72   Ht 188 cm (74\")   Wt 101 kg (223 lb)   SpO2 98%   BMI 28.63 kg/m²     Lab Review:   I have reviewed previous office notes, device interrogations, recent labs and recent cardiac testing.     Lab Results   Component Value Date    CHOL 91 02/10/2020    CHLPL 100 (L) 12/13/2019    TRIG 93 02/10/2020    HDL 38 (L) 02/10/2020    LDL 34 02/10/2020     anel 10/20:  Interpretation Summary    · Left ventricular ejection fraction is severely reduced. (Calculated EF = 21%).  · Myocardial perfusion imaging indicates a small-sized infarct located in the apex with no significant ischemia noted.  · Impressions are consistent with a low risk study.       Echo 2/20:  Interpretation Summary    · The left ventricular cavity is mild-to-moderately dilated.  · Right ventricular cavity is moderately dilated.  · Moderately reduced right ventricular systolic function noted.  · Left atrial cavity size is dilated.  · Mild mitral valve regurgitation is present  · Mild tricuspid valve regurgitation is present.           "   Assessment:          Diagnosis Plan   1. Coronary artery disease involving native coronary artery of native heart without angina pectoris     2. S/P coronary artery stent placement     3. Chronic systolic heart failure (CMS/[facility])     4. AICD (automatic cardioverter/defibrillator) present     5. V-tach (CMS/[facility])     6. PVD (peripheral vascular disease) (CMS/[facility])     7. Essential hypertension     8. Mixed hyperlipidemia     9. ESRD (end stage renal disease) on dialysis (CMS/[facility])     10. COPD, group B, by GOLD 2017 classification (CMS/[facility])            Plan:       1. CAD-stable. No clinical signs of ischemia. low risk nuclear in October. Continue ASA and statin. Not on ACEI due to CKD. Not on BB due to hypotension  2. S/p MELISA- to mid LAD in 2011 and proximal LAD in 2018.   3. CHF- decompensated. Due for dialysis tomorrow. Nephrology is managing diuretics and dialysis. Not on ACEI due to CKD. Not on BB due to hypotension. Last EF on echo in Feb noted at 26-30%. Reviewed signs and symptoms of CHF and what to report with the patient. Patient instructed to restrict sodium and weigh daily. Report weight gain of greater than 2 lbs overnight or 5 lbs in 1 week. Pt verbalized understanding of instructions and plan of care.   4. AICD- normal device function with elevated heart logic score 1 month ago.   5. VT- stable. No further episodes noted on device interrogation. Continue Amio BID  6. PVD- stable. Followed by vascular  7. HTN- controlled. Hypotensive today. All antihypertensives have been held  8. HLD- controlled with last LDL at 34. Continue statin  9. ESRD- stable on dialysis.   10. COPD- stable. Needs to follow up with pulmonary    Follow up in 3 months or sooner if symptoms worsen.     Current outpatient and discharge medications have been reconciled for the patient.  Reviewed by: Brenda Alvarado, APRN

## 2020-11-11 NOTE — PATIENT INSTRUCTIONS

## 2020-11-12 NOTE — TELEPHONE ENCOUNTER
Spoke with Mr Flynn reminding him of his appointment for Friday, November 13th, 2020 at 130 pm with Shwetha BAIRD. Mr Flynn confirmed he would be here.

## 2020-11-16 NOTE — PROGRESS NOTES
11/13/2020       Alli Perry MD  [address] [facility] [place] [address]        Jadon Flynn  1950    Chief Complaint   Patient presents with   • Follow-up     2 Week Post-Op Follow Up For RIGHT ARTERIOVENOUS FISTULA FORMATION. Patient denies any stroke like symptoms.    • other     Patient states Right arm is swollen and painful and has been swollen. Patient also states Left Leg has a severe red area on the upper thigh as well as bilateral lower legs are red. Patient states he cant hardly move the Left Lg.    • Former Smoker     Patient is a Former Smoker   • Med Management     Verbally verified medications with patient/son       Dear Alli Perry MD:    HPI     I had the pleasure of seeing you patient in the office today for follow up.  As you recall, the patient is a 70 y.o. male who we are currently following for carotid occlusive disease.  He is status post left carotid endarterectomy and has a known right carotid occlusion.  Currently, he appears to be doing quite well and has no specific complaints.  He remains asymptomatic from a strokelike standpoint.  He also did undergo right lower extremity radiofrequency ablation of the greater saphenous vein for leg swelling and symptomatic venous disease.   He also recently underwent cardiac intervention as well as iliac artery intervention by Dr. Mckeon.  In April a PermCath was placed and he was to follow here when permanent access was needed.  At the end of April he was told by [employer] driver he needed permanent access and was referred to [facility].  He did undergo right AV fistula creation on 10/30/2020 and is now back for follow-up.  He is complaining of swelling to his right arm as well as significant swelling in his lower extremities.  His legs are very swollen but he states he cannot bear weight on his left leg.  He does have a little bit of bruising to the left upper thigh.  He is maintained on aspirin and Lipitor.  He is on metolazone and Bumex.   "He did have dialysis this morning.  He had noninvasive testing performed today in which I personally reviewed.      Review of Systems   HENT: Negative.    Eyes: Negative.    Respiratory: Negative.    Cardiovascular: Positive for leg swelling.   Gastrointestinal: Negative.    Endocrine: Negative.    Genitourinary: Negative.    Musculoskeletal: Positive for gait problem.   Skin: Positive for color change.   Allergic/Immunologic: Negative.    Neurological: Positive for weakness.   Hematological: Negative.    Psychiatric/Behavioral: Negative.    All other systems reviewed and are negative.        /70 (BP Location: Left arm, Patient Position: Sitting, Cuff Size: Adult)   Pulse 79   Ht 188 cm (74\")   Wt 101 kg (223 lb)   SpO2 96%   BMI 28.63 kg/m²   Physical Exam  Vitals signs and nursing note reviewed.   Constitutional:       Appearance: He is well-developed.   HENT:      Head: Normocephalic and atraumatic.   Neck:      Musculoskeletal: Neck supple.      Thyroid: No thyromegaly.      Vascular: No carotid bruit or JVD.   Cardiovascular:      Rate and Rhythm: Normal rate and regular rhythm.      Pulses:           Carotid pulses are 2+ on the right side and 2+ on the left side.       Femoral pulses are 2+ on the right side and 2+ on the left side.       Popliteal pulses are 2+ on the right side and 2+ on the left side.        Dorsalis pedis pulses are 2+ on the right side and 2+ on the left side.        Posterior tibial pulses are 2+ on the right side and 2+ on the left side.      Heart sounds: Normal heart sounds.      Comments: permcath  Right fistula, incision intact, thrill +  Pulmonary:      Effort: Pulmonary effort is normal.      Breath sounds: Normal breath sounds.   Abdominal:      General: Bowel sounds are normal. There is no distension or abdominal bruit.      Palpations: Abdomen is soft. There is no mass.      Tenderness: There is no abdominal tenderness.   Musculoskeletal: Normal range of motion.     "  Right lower leg: 3+ Edema present.      Left lower leg: 3+ Edema present.   Skin:     General: Skin is warm.      Findings: Bruising (Left thigh) present.   Neurological:      General: No focal deficit present.      Mental Status: He is alert and oriented to person, place, and time.      Cranial Nerves: No cranial nerve deficit.      Sensory: No sensory deficit.   Psychiatric:         Mood and Affect: Mood normal.         Behavior: Behavior normal.         Thought Content: Thought content normal.         Judgment: Judgment normal.           DIAGNOSTIC DATA:   Noninvasive testing including a left lower extremity venous duplex was negative for DVT.    Patient Active Problem List   Diagnosis   • PVD (peripheral vascular disease) (CMS/[facility])   • Ischemic cardiomyopathy   • COPD, group B, by GOLD 2017 classification (CMS/[facility])   • Chronic systolic heart failure (CMS/[facility])   • Moderate pulmonary arterial systolic hypertension (CMS/[facility])   • ESRD (end stage renal disease) on dialysis (CMS/[facility])   • AICD (automatic cardioverter/defibrillator) present   • Essential hypertension   • V-tach (CMS/[facility])   • Chest pain   • Thyroid nodule   • Tobacco abuse, in remission   • Anemia, chronic disease   • Hypothyroidism (acquired)   • Preop testing   • Coronary artery disease involving native coronary artery of native heart without angina pectoris   • S/P coronary artery stent placement   • Mixed hyperlipidemia         ICD-10-CM ICD-9-CM   1. A-V fistula (CMS/[facility])  I77.0 447.0   2. Leg swelling  M79.89 729.81       PLAN: After thoroughly evaluating Jadon NBA Flynn, I believe the best course of action is to remain conservative from vascular surgery standpoint.  His incision is healed.  He does have significant swelling however is generally appearing fluid overloaded.  I did wrap his right arm with an Ace wrap and instructed him to keep it elevated above his heart.  He did have dialysis this a.m. and reports he has been having low blood  pressures.  I did order a venous duplex of his left lower extremity as he reports he cannot even stand on it however this was negative for DVT.  I also discussed with cardiology if anything can be changed from their standpoint as I feel like he is volume overloaded.  Brenda will contact Dr. Trevino for recommendations also.  We will see him back in 8 weeks to determine if fistula is mature for use at that time.  Nobody is to use his arm until he is seen.  We are following his PAD and carotid disease annually.  I did discuss vascular risk factors as they pertain to the progression of vascular disease including controlling his hypertension and hyperlipidemia, which are stable on his current medication regimen. Patient's Body mass index is 28.63 kg/m². BMI is above normal parameters. Recommendations include: educational material. The patient is to continue taking their medications as previously discussed.   This was all discussed in full with complete understanding.  Thank you for allowing me to participate in the care of your patient.  Please do not hesitate to call with any questions or concerns.  We will keep you aware of any further encounters with Jadon Flynn.      Sincerely Yours,        OLI Barnett

## 2020-11-16 NOTE — TELEPHONE ENCOUNTER
Left message letting Mr Flynn know that I had spoke with Shwetha BAIRD in regards to his Left Leg testing results. Shwetha BAIRD states that their is no blood clot present and wanted to check on the swelling in his arm and see how that was doing and if he is continuing to wrap it. I advised Mr Flynn if he had any questions concerns or needed us to please call the office at [phone].

## 2021-10-01 ENCOUNTER — APPOINTMENT (OUTPATIENT)
Dept: ULTRASOUND IMAGING | Facility: HOSPITAL | Age: 71
End: 2021-10-01

## 2024-10-25 NOTE — TELEPHONE ENCOUNTER
Spoke with patient and advised of upcoming procedure.  Patient pre work is scheduled for 10/27/2020 at 700 am.  Patient procedure is scheduled for 10/30/2020 at 1015 am. Patient advised of Covid 19, masking and visitation.  Patient advised of location time and prep.  Patient expressed understanding for all that was discussed.      25-Oct-2024

## (undated) DEVICE — 3M™ STERI-STRIP™ REINFORCED ADHESIVE SKIN CLOSURES, R1547, 1/2 IN X 4 IN (12 MM X 100 MM), 6 STRIPS/ENVELOPE: Brand: 3M™ STERI-STRIP™

## (undated) DEVICE — PROXIMATE RH ROTATING HEAD SKIN STAPLERS (35 REGULAR) CONTAINS 35 STAINLESS STEEL STAPLES: Brand: PROXIMATE

## (undated) DEVICE — 6F .070 XB LAD 3.5 100CM: Brand: VISTA BRITE TIP

## (undated) DEVICE — GLV SURG SENSICARE W/ALOE PF LF 7.5 STRL

## (undated) DEVICE — SKIN AFFIX SURG ADHESIVE 72/CS 0.55ML: Brand: MEDLINE

## (undated) DEVICE — CVR PROB GEN PURP W ISOSILK 6X48

## (undated) DEVICE — CANN VESL ACRN TP 4MM

## (undated) DEVICE — BNDG ADHS CURAD FLX/FABRC 1X3IN STRL LF

## (undated) DEVICE — GW STARTER FXD CORE J .035 3X150CM 3MM

## (undated) DEVICE — MODEL BT2000 P/N 700287-012KIT CONTENTS: MANIFOLD WITH SALINE AND CONTRAST PORTS, SALINE TUBING WITH SPIKE AND HAND SYRINGE, TRANSDUCER: Brand: BT2000 AUTOMATED MANIFOLD KIT

## (undated) DEVICE — LP VESL MINI BLU PK/2

## (undated) DEVICE — DRSNG SURESITE WNDW 4X4.5

## (undated) DEVICE — INFLATION DEVICE: Brand: ENCORE™ 26

## (undated) DEVICE — CANN CO2/O2 NASL A/

## (undated) DEVICE — GW ZIPWIRE STD ANGL .035IN 150CM

## (undated) DEVICE — APPL CHLORAPREP W/TINT 26ML ORNG

## (undated) DEVICE — SYR SLP TP 10ML DISP

## (undated) DEVICE — PAD MINOR UNIVERSAL: Brand: MEDLINE INDUSTRIES, INC.

## (undated) DEVICE — SNAP KOVER: Brand: UNBRANDED

## (undated) DEVICE — SYR CONTRL LUERLOK 10CC

## (undated) DEVICE — SUT MNCRYL 4/0 PS2 27IN UD MCP426H

## (undated) DEVICE — BNDG ELAS W/CLIP 6IN 10YD LF STRL

## (undated) DEVICE — PINNACLE INTRODUCER SHEATH: Brand: PINNACLE

## (undated) DEVICE — BNDG ELAS ECON W/CLIP 4IN 5YD LF STRL

## (undated) DEVICE — GEL ULTRASND HI VISC 20G PACKET STRL

## (undated) DEVICE — PK TURNOVER RM ADV

## (undated) DEVICE — INTENDED FOR TISSUE SEPARATION, AND OTHER PROCEDURES THAT REQUIRE A SHARP SURGICAL BLADE TO PUNCTURE OR CUT.: Brand: BARD-PARKER ® STAINLESS STEEL BLADES

## (undated) DEVICE — SYR LUERLOK 20CC BX/50

## (undated) DEVICE — SCANLAN® VASCU-STATT® II SINGLE-USE BULLDOG CLAMP W/FIRMER CLAMPING PRESS - MAXI ANGLED 45°(ORANGE), CLAMPING PRESSURE 165-175 G (2/STERILE PKG): Brand: SCANLAN® VASCU-STATT® II SINGLE-USE BULLDOG CLAMP W/FIRMER CLAMPING PRESS

## (undated) DEVICE — ELECTRD PAD DEFIB A/

## (undated) DEVICE — PROXIMATE RH ROTATING HEAD SKIN STAPLERS (35 WIDE) CONTAINS 35 STAINLESS STEEL STAPLES: Brand: PROXIMATE

## (undated) DEVICE — PAD,PREPPING,CUFFED,24X48,7",NONSTERILE: Brand: MEDLINE

## (undated) DEVICE — SUT ETHLN 2/0 FS 18IN 664H

## (undated) DEVICE — BANDAGE,GAUZE,BULKEE II,4.5"X4.1YD,STRL: Brand: MEDLINE

## (undated) DEVICE — SYR PRECISIONGLIDE LL 5CC 20X1 1/2IN

## (undated) DEVICE — SOL IRR NACL 0.9PCT BT 1000ML

## (undated) DEVICE — TREK CORONARY DILATATION CATHETER 3.0 MM X 15 MM / RAPID-EXCHANGE: Brand: TREK

## (undated) DEVICE — ST MIC/INTRO ACC SHRP/NDL TUNG/TP NITNL 5F 45CM 7CM

## (undated) DEVICE — MODEL AT P65, P/N 701554-001KIT CONTENTS: HAND CONTROLLER, 3-WAY HIGH-PRESSURE STOPCOCK WITH ROTATING END AND PREMIUM HIGH-PRESSURE TUBING: Brand: ANGIOTOUCH® KIT

## (undated) DEVICE — ST INF 2NDARY 20DRP VNT/NOVNT 30IN

## (undated) DEVICE — SPNG GZ STRL 2S 4X4 12PLY

## (undated) DEVICE — INTRO TEAR AWAY/LVD W/SD PRT 6F 13CM

## (undated) DEVICE — DISPOSABLE SURGICAL CABLE

## (undated) DEVICE — KT CATH TAL VENATRAC PALINDROM 14.5F 19CM

## (undated) DEVICE — SPNG GZ 2S 2X2 8PLY STRL PK/2

## (undated) DEVICE — CATH WORLEY STD 9F40CM

## (undated) DEVICE — NDL SPINE 20G 3 1/2 YEL STRL 1P/U

## (undated) DEVICE — VLV HEMO GUARDIAN INSRT/TOOL W TORQ DEV

## (undated) DEVICE — CATH VASC RF CLOSUREFAST 7CM 7F100CM

## (undated) DEVICE — SOLIDIFIER LIQUI LOC PLUS 2000CC

## (undated) DEVICE — CATH DIAG IMPULSE M/ PK 145 5FR

## (undated) DEVICE — PAD MAJOR VASCULAR: Brand: MEDLINE INDUSTRIES, INC.

## (undated) DEVICE — ANGIO-SEAL VIP VASCULAR CLOSURE DEVICE: Brand: ANGIO-SEAL

## (undated) DEVICE — NDL HYPO PRECISIONGLIDE REG 25G 1 1/2

## (undated) DEVICE — SOL NS 500ML

## (undated) DEVICE — EACH VASCULAR SOLUTIONS D-STAT® FLOWABLE HEMOSTAT (D-STAT) INCLUDES THE FOLLOWING COMPONENTS: THROMBIN VIAL (5,000 UNITS); COLLAGEN (200MG), CONTAINED IN 10ML SYRINGE WITH ATTACHED MIXING LUER; DILUENT VIAL (5ML); MIXING ACCESSORIES (10ML SYRINGE AND NEEDLELESS, NON-CORING VIAL ACCESS DEVICE); APPLICATOR TIPS: (1) SMALL BORE TIP, (1) 20-GAUGE, 2.75" NEEDLE.THE THROMBIN IS A PROTEIN SUBSTANCE PRODUCED THROUGH A CONVERSION REACTION IN WHICH PROTHROMBIN OF BOVINE ORIGIN IS ACTIVATED BY TISSUE THROMBOPLASTIN OF BOVINE-ORIGIN IN THE PRESENCE OF CALCIUM CHLORIDE. IT IS SUPPLIED AS A STERILE POWDER THAT HAS BEEN FREEZE-DRIED IN THE FINAL CONTAINER. ALSO CONTAINED IN THE THROMBIN VIAL ARE MANNITOL AND SODIUM CHLORIDE. MANNITOL IS INCLUDED TO MAKE THE DRIED PRODUCT FRIABLE AND MORE READILY SOLUBLE. THE MATERIAL CONTAINS NO PRESERVATIVE AND HAS BEEN CHROMATOGRAPHICALLY PURIFIED. THROMBIN REQUIRES NO INTERMEDIATE PHYSIOLOGICAL AGENT FOR ITS REACTION. IT CONVERTS FIBRINOGEN DIRECTLY TO FIBRIN.THE COLLAGEN IS A SOFT, WHITE, PLIABLE, ABSORBENT HEMOSTATIC AGENT DERIVED FROM PURIFIED BOVINE DEEP FLEXOR TENDON. IT IS PREPARED IN A LOOSE FIBROUS FORM. COLLAGEN ATTRACTS PLATELETS THAT ADHERE TO THE FIBRILS AND UNDERGO THE RELEASE PHENOMENON TO TRIGGER AGGREGATION OF PLATELETS INTO THROMBI IN THE INTERSTICES OF THE FIBROUS MASS. THE COLLAGEN PROVIDES A THREE-DIMENSIONAL MATRIX FOR ADDITIONAL STRENGTHENING OF THE CLOT. THE EFFECT ON PLATELET ADHESION AND AGGREGATION IS NOT INHIBITED BY HEPARIN IN VITRO.THE DILUENT IS A STERILE SOLUTION OF CALCIUM CHLORIDE AND WATER, BUFFERED WITH TROMETHAMINE (TRIS). USING THE MIXING ACCESSORIES, BOTH THE THROMBIN AND COLLAGEN ARE RECONSTITUTED WITH THE DILUENT PRIOR TO USE. THE HEMOSTAT IS DELIVERED TO THE INTENDED TREATMENT SITE USING THE PROVIDED APPLICATOR TIPS. HEMOSTASIS IS ACHIEVED BY THE PHYSIOLOGICAL COAGULATION-INDUCING PROPERTIES OF THE D-STAT. THE D-STAT IS BIOCOMPATIBLE, NON-PYROGENIC, AND INTENDED TO BE LEFT IN SITU.: Brand: D-STAT® FLOWABLE HEMOSTAT

## (undated) DEVICE — SHEATH INTRO MICRO 7F 11CM

## (undated) DEVICE — 3M™ IOBAN™ 2 ANTIMICROBIAL INCISE DRAPE 6650EZ: Brand: IOBAN™ 2

## (undated) DEVICE — SOL NACL 0.9PCT 100ML SGL

## (undated) DEVICE — APPL CHLORAPREP HI/LITE 26ML ORNG

## (undated) DEVICE — PK CATH CARD 30 CA/4

## (undated) DEVICE — WIPE MEROCEL 3.625X3IN

## (undated) DEVICE — ANTIBACTERIAL UNDYED BRAIDED (POLYGLACTIN 910), SYNTHETIC ABSORBABLE SUTURE: Brand: COATED VICRYL

## (undated) DEVICE — STERILE ULTRASOUND GEL, SAFEWRAP: Brand: ECOVUE

## (undated) DEVICE — ST TB EXT STANDARDBORE 30IN

## (undated) DEVICE — Device: Brand: MEDEX

## (undated) DEVICE — SUT PROLN 6/0 4/24IN BV1 MON BL M8805

## (undated) DEVICE — SUT PROLN 7/0 BV1 24IN 4PK M8702

## (undated) DEVICE — SYR LL TP 10ML STRL

## (undated) DEVICE — PTA BALLOON DILATATION CATHETER: Brand: MUSTANG™

## (undated) DEVICE — PK PM 30

## (undated) DEVICE — CATH FLSH OMNI SFT 5F 90CM